# Patient Record
Sex: MALE | Race: WHITE | NOT HISPANIC OR LATINO | Employment: UNEMPLOYED | URBAN - NONMETROPOLITAN AREA
[De-identification: names, ages, dates, MRNs, and addresses within clinical notes are randomized per-mention and may not be internally consistent; named-entity substitution may affect disease eponyms.]

---

## 2019-08-19 ENCOUNTER — OFFICE VISIT (OUTPATIENT)
Dept: CARDIOLOGY | Facility: CLINIC | Age: 44
End: 2019-08-19

## 2019-08-19 VITALS
HEART RATE: 85 BPM | SYSTOLIC BLOOD PRESSURE: 127 MMHG | HEIGHT: 69 IN | WEIGHT: 198.2 LBS | OXYGEN SATURATION: 97 % | BODY MASS INDEX: 29.36 KG/M2 | DIASTOLIC BLOOD PRESSURE: 86 MMHG

## 2019-08-19 DIAGNOSIS — R07.9 CHEST PAIN, UNSPECIFIED TYPE: ICD-10-CM

## 2019-08-19 DIAGNOSIS — R06.02 SHORTNESS OF BREATH: Primary | ICD-10-CM

## 2019-08-19 DIAGNOSIS — I25.10 CORONARY ARTERY DISEASE INVOLVING NATIVE CORONARY ARTERY OF NATIVE HEART WITHOUT ANGINA PECTORIS: ICD-10-CM

## 2019-08-19 DIAGNOSIS — I25.5 CARDIOMYOPATHY, ISCHEMIC: ICD-10-CM

## 2019-08-19 PROCEDURE — 99204 OFFICE O/P NEW MOD 45 MIN: CPT | Performed by: PHYSICIAN ASSISTANT

## 2019-08-19 RX ORDER — POTASSIUM CHLORIDE 750 MG/1
TABLET, FILM COATED, EXTENDED RELEASE ORAL 2 TIMES DAILY
COMMUNITY
Start: 2019-08-07 | End: 2022-01-04 | Stop reason: SDUPTHER

## 2019-08-19 RX ORDER — ATORVASTATIN CALCIUM 80 MG/1
80 TABLET, FILM COATED ORAL DAILY
COMMUNITY
Start: 2019-08-13 | End: 2022-01-04 | Stop reason: SDUPTHER

## 2019-08-19 RX ORDER — NICOTINE 21 MG/24HR
PATCH, TRANSDERMAL 24 HOURS TRANSDERMAL DAILY
COMMUNITY
Start: 2019-08-13 | End: 2019-11-21

## 2019-08-19 RX ORDER — ASPIRIN 81 MG
TABLET, DELAYED RELEASE (ENTERIC COATED) ORAL 2 TIMES DAILY
COMMUNITY
Start: 2019-07-15

## 2019-08-19 RX ORDER — ISOSORBIDE MONONITRATE 30 MG/1
30 TABLET, EXTENDED RELEASE ORAL EVERY MORNING
Qty: 30 TABLET | Refills: 11 | Status: SHIPPED | OUTPATIENT
Start: 2019-08-19 | End: 2019-11-21

## 2019-08-19 RX ORDER — METOPROLOL SUCCINATE 25 MG/1
25 TABLET, EXTENDED RELEASE ORAL DAILY
COMMUNITY
Start: 2019-08-07 | End: 2020-08-20 | Stop reason: SDUPTHER

## 2019-08-19 RX ORDER — ASPIRIN 81 MG/1
81 TABLET, COATED ORAL DAILY
COMMUNITY
Start: 2019-07-15 | End: 2022-01-04 | Stop reason: SDUPTHER

## 2019-08-19 RX ORDER — ACETAMINOPHEN 325 MG/1
325 TABLET ORAL AS NEEDED
COMMUNITY
Start: 2019-07-15

## 2019-08-19 RX ORDER — NITROGLYCERIN 0.4 MG/1
TABLET SUBLINGUAL
Qty: 100 TABLET | Refills: 11 | Status: SHIPPED | OUTPATIENT
Start: 2019-08-19 | End: 2023-01-16 | Stop reason: SDUPTHER

## 2019-08-19 RX ORDER — FUROSEMIDE 20 MG/1
20 TABLET ORAL DAILY
COMMUNITY
Start: 2019-08-07 | End: 2022-01-04 | Stop reason: SDUPTHER

## 2019-08-19 NOTE — PROGRESS NOTES
Subjective   Navi Hanley is a 44 y.o. male     Chief Complaint   Patient presents with   • Establish Care     presents for cardiac eval ([facility] 4/2019)   • Chest Pain   • Palpitations   • Shortness of Breath       HPI    Problem list  1.  Coronary artery disease  1.1 cardiac catheterization April 2019 demonstrated high-grade three-vessel disease with an EF of 30% with recommendations for coronary artery bypass grafting  1.2 coronary artery bypass grafting, three-vessel, April 2019 per patient report, inadequate data  2.  Ischemic cardiomyopathy  2.1 EF estimated 30% by catheterization April 2019  3.  Dyslipidemia  4.  Chronic tobacco use      Patient is a 44-year-old male who presents back to the office for an evaluation.  Patient had an episode of chest discomfort that required hospitalization in April and subsequent coronary artery bypass grafting.  Patient is here today to establish care not having seen a cardiologist in the outpatient setting.    Patient describes to me that he feels better although still has occasional pressure.  Patient describes to me on the day if he has severe chest pain that it felt as if 100 pounds was sitting on his chest.  He still feels slight pressure but not as severe as what he felt with previous bypass.  He has shortness of breath.  This is experienced  with him to climb heels or walk for extended periods.  Otherwise he is doing well.  He has no PND orthopnea.    He does not palpitated of dysrhythmic symptoms.  Patient describes trying to quit using tobacco.  He is on nicotine patches and is decreasing his tobacco use.  Otherwise is doing well      Current Outpatient Medications   Medication Sig Dispense Refill   • ASPIRIN LOW DOSE 81 MG EC tablet Daily.     • atorvastatin (LIPITOR) 80 MG tablet Daily.     • furosemide (LASIX) 20 MG tablet Daily.     • MAPAP 325 MG tablet As Needed.     • metoprolol succinate XL (TOPROL-XL) 25 MG 24 hr tablet Daily.     • nicotine  (NICODERM CQ) 14 MG/24HR patch Daily.     • potassium chloride (K-DUR) 10 MEQ CR tablet 2 (Two) Times a Day.     • SENNA PLUS 8.6-50 MG per tablet Daily.     • isosorbide mononitrate (IMDUR) 30 MG 24 hr tablet Take 1 tablet by mouth Every Morning. 30 tablet 11   • nitroglycerin (NITROSTAT) 0.4 MG SL tablet 1 under the tongue as needed for angina, may repeat q5mins for up three doses 100 tablet 11     No current facility-administered medications for this visit.        Patient has no known allergies.    Past Medical History:   Diagnosis Date   • Coronary artery disease    • Hyperlipidemia    • Hypertension    • Myocardial infarction (CMS/HCC)        Social History     Socioeconomic History   • Marital status:      Spouse name: Not on file   • Number of children: Not on file   • Years of education: Not on file   • Highest education level: Not on file   Tobacco Use   • Smoking status: Current Every Day Smoker     Years: 30.00     Types: Cigarettes   • Smokeless tobacco: Former User   Substance and Sexual Activity   • Alcohol use: No     Frequency: Never   • Drug use: No           Family History   Problem Relation Age of Onset   • Heart disease Mother    • Cancer Mother    • Hyperlipidemia Father    • Hypertension Father        Review of Systems   Constitutional: Positive for fatigue.   HENT: Negative.    Eyes: Negative.    Respiratory: Positive for shortness of breath (with daily activity  ) and wheezing.    Cardiovascular: Positive for chest pain (pressure) and leg swelling. Negative for palpitations (occas).   Gastrointestinal: Positive for nausea and vomiting.   Endocrine: Negative.    Genitourinary: Negative.    Musculoskeletal: Positive for back pain.   Skin: Negative.    Allergic/Immunologic: Negative.    Neurological: Positive for weakness and numbness (on left side following CABG).   Hematological: Negative.    Psychiatric/Behavioral: Positive for agitation and sleep disturbance (wakes up smothering).  "The patient is nervous/anxious.    All other systems reviewed and are negative.      Objective   Vitals:    08/19/19 1454   BP: 127/86   BP Location: Left arm   Patient Position: Sitting   Pulse: 85   SpO2: 97%   Weight: 89.9 kg (198 lb 3.2 oz)   Height: 175.3 cm (69\")      /86 (BP Location: Left arm, Patient Position: Sitting)   Pulse 85   Ht 175.3 cm (69\")   Wt 89.9 kg (198 lb 3.2 oz)   SpO2 97%   BMI 29.27 kg/m²     Lab Results (most recent)     None          Physical Exam   Constitutional: He is oriented to person, place, and time. He appears well-developed and well-nourished. No distress.   HENT:   Head: Normocephalic and atraumatic.   Eyes: EOM are normal. Pupils are equal, round, and reactive to light.   Neck: No JVD present.   Cardiovascular: Normal rate, regular rhythm, normal heart sounds and intact distal pulses. Exam reveals no gallop and no friction rub.   No murmur heard.  Pulmonary/Chest: Effort normal and breath sounds normal. No respiratory distress. He has no wheezes. He has no rales. He exhibits no tenderness.   Musculoskeletal: Normal range of motion. He exhibits no edema.   Neurological: He is alert and oriented to person, place, and time. No cranial nerve deficit.   Skin: Skin is warm and dry. No rash noted. No erythema. No pallor.   Psychiatric: He has a normal mood and affect. His behavior is normal.   Nursing note and vitals reviewed.      Procedure   Procedures         Assessment/Plan     Problems Addressed this Visit        Cardiovascular and Mediastinum    Cardiomyopathy, ischemic    Relevant Medications    metoprolol succinate XL (TOPROL-XL) 25 MG 24 hr tablet    isosorbide mononitrate (IMDUR) 30 MG 24 hr tablet    nitroglycerin (NITROSTAT) 0.4 MG SL tablet    Other Relevant Orders    Comprehensive Metabolic Panel    Lipid Panel    Coronary artery disease involving native coronary artery of native heart without angina pectoris    Relevant Medications    metoprolol succinate XL " (TOPROL-XL) 25 MG 24 hr tablet    isosorbide mononitrate (IMDUR) 30 MG 24 hr tablet    nitroglycerin (NITROSTAT) 0.4 MG SL tablet    Other Relevant Orders    Comprehensive Metabolic Panel    Lipid Panel       Respiratory    Shortness of breath - Primary    Relevant Orders    Comprehensive Metabolic Panel    Lipid Panel       Nervous and Auditory    Chest pain    Relevant Orders    Comprehensive Metabolic Panel    Lipid Panel            Recommendation  1.  Patient with coronary artery disease with continued complaints of chest discomfort although not as severe as what he felt previous bypass.  I would like to empirically try him on antianginal therapy to see if we could medically improve his symptoms further.  2.  Obviously, we want to become aggressive at low risk factor modification.  I would like to recheck patient's laboratories to see what his cholesterol is.  We again discussed tobacco cessation and the potential benefits of stopping.  3.  I would like to reevaluate patient's cardiomyopathy.  He is on beta-blocker therapy.  I would like to see his LV function post revascularization and we will schedule echocardiogram.  4.  I am sending in nitroglycerin as needed for chest pain.  I have counseled him on how to use that medication.  Any chest pain, not resolved by nitroglycerin, he will go to the ER.  5.  We will hopefully receive patient's op note from [facility].  We will see patient back for follow-up after testing.  Follow-up with primary as scheduled           Navi Hanley is a current cigarettes user.  He currently smokes 1 pack of cigarettes per day for a duration of 30 years. I have educated him on the risk of diseases from using tobacco products such as cancer, COPD and heart diease.     I advised him to quit and he is not willing to quit.           Patient's Body mass index is 29.27 kg/m². BMI is within normal parameters. No follow-up required..         Electronically signed by:

## 2019-10-21 ENCOUNTER — TELEPHONE (OUTPATIENT)
Dept: CARDIOLOGY | Facility: CLINIC | Age: 44
End: 2019-10-21

## 2019-10-21 ENCOUNTER — OFFICE VISIT (OUTPATIENT)
Dept: CARDIOLOGY | Facility: CLINIC | Age: 44
End: 2019-10-21

## 2019-10-21 VITALS
WEIGHT: 197 LBS | DIASTOLIC BLOOD PRESSURE: 86 MMHG | HEART RATE: 106 BPM | BODY MASS INDEX: 29.18 KG/M2 | SYSTOLIC BLOOD PRESSURE: 137 MMHG | OXYGEN SATURATION: 97 % | HEIGHT: 69 IN | RESPIRATION RATE: 16 BRPM

## 2019-10-21 DIAGNOSIS — I50.22 CHRONIC SYSTOLIC CONGESTIVE HEART FAILURE (HCC): ICD-10-CM

## 2019-10-21 DIAGNOSIS — R06.02 SHORTNESS OF BREATH: Primary | ICD-10-CM

## 2019-10-21 DIAGNOSIS — I25.5 ISCHEMIC CARDIOMYOPATHY: ICD-10-CM

## 2019-10-21 DIAGNOSIS — I25.10 CORONARY ARTERY DISEASE INVOLVING NATIVE CORONARY ARTERY OF NATIVE HEART WITHOUT ANGINA PECTORIS: ICD-10-CM

## 2019-10-21 PROCEDURE — 99214 OFFICE O/P EST MOD 30 MIN: CPT | Performed by: PHYSICIAN ASSISTANT

## 2019-10-21 RX ORDER — RANOLAZINE 1000 MG/1
1000 TABLET, EXTENDED RELEASE ORAL 2 TIMES DAILY
Qty: 60 TABLET | Refills: 6 | Status: SHIPPED | OUTPATIENT
Start: 2019-10-21 | End: 2019-11-21 | Stop reason: ALTCHOICE

## 2019-10-21 NOTE — PROGRESS NOTES
Problem list     Subjective   Navi Hanley is a 44 y.o. male     Chief Complaint   Patient presents with   • Coronary Artery Disease   • Shortness of Breath   Problem list  1.  Coronary artery disease  1.1 cardiac catheterization April 2019 demonstrated high-grade three-vessel disease with an EF of 30% with recommendations for coronary artery bypass grafting  1.2 coronary artery bypass grafting, three-vessel, April 2019 per patient report, inadequate data  2.  Ischemic cardiomyopathy  2.1 EF estimated 30% by catheterization April 2019  3.  Dyslipidemia  4.  Chronic tobacco use       HPI      Patient is a 44-year-old male who presents back to the office for follow-up.  We saw the patient today in regards to patient's coronary disease as well as for persistent chest discomfort.  Last office visit we titrated antianginal therapy to help with symptoms but unfortunately continues to have symptoms of discomfort.  Symptoms can have some similarities to what he felt with previous bypass grafting.  Pain has not been severe but still has remained noticeable.  He has moderate levels of shortness of breath but likely has lung disease with continuing to use tobacco.  He has no PND orthopnea.    He does not complain of palpitations or dysrhythmic symptoms.  He otherwise is doing well      Outpatient Encounter Medications as of 10/21/2019   Medication Sig Dispense Refill   • ASPIRIN LOW DOSE 81 MG EC tablet Daily.     • atorvastatin (LIPITOR) 80 MG tablet Daily.     • furosemide (LASIX) 20 MG tablet Daily.     • isosorbide mononitrate (IMDUR) 30 MG 24 hr tablet Take 1 tablet by mouth Every Morning. 30 tablet 11   • MAPAP 325 MG tablet As Needed.     • metoprolol succinate XL (TOPROL-XL) 25 MG 24 hr tablet Daily.     • nicotine (NICODERM CQ) 14 MG/24HR patch Daily.     • nitroglycerin (NITROSTAT) 0.4 MG SL tablet 1 under the tongue as needed for angina, may repeat q5mins for up three doses 100 tablet 11   • potassium chloride  (K-DUR) 10 MEQ CR tablet 2 (Two) Times a Day.     • SENNA PLUS 8.6-50 MG per tablet Daily.     • ranolazine (RANEXA) 1000 MG 12 hr tablet Take 1 tablet by mouth 2 (Two) Times a Day. 60 tablet 6     No facility-administered encounter medications on file as of 10/21/2019.        Patient has no known allergies.    Past Medical History:   Diagnosis Date   • Coronary artery disease    • Hyperlipidemia    • Hypertension    • Myocardial infarction (CMS/HCC)        Social History     Socioeconomic History   • Marital status:      Spouse name: Not on file   • Number of children: Not on file   • Years of education: Not on file   • Highest education level: Not on file   Tobacco Use   • Smoking status: Current Every Day Smoker     Packs/day: 1.00     Years: 30.00     Pack years: 30.00     Types: Cigarettes   • Smokeless tobacco: Former User   Substance and Sexual Activity   • Alcohol use: No     Frequency: Never   • Drug use: No       Family History   Problem Relation Age of Onset   • Heart disease Mother    • Cancer Mother    • Hyperlipidemia Father    • Hypertension Father        Review of Systems   Constitutional: Positive for fatigue. Negative for activity change and appetite change.   HENT: Negative.    Eyes: Negative.    Respiratory: Positive for chest tightness and shortness of breath.    Cardiovascular: Positive for chest pain. Negative for palpitations and leg swelling.   Gastrointestinal: Negative for abdominal distention, abdominal pain, nausea and vomiting.   Endocrine: Negative for cold intolerance and heat intolerance.   Genitourinary: Negative.    Musculoskeletal: Positive for arthralgias, back pain and joint swelling. Negative for gait problem and myalgias.   Skin: Negative for color change and rash.   Allergic/Immunologic: Negative for environmental allergies and food allergies.   Neurological: Positive for dizziness (upon standing or with quick sudden movements) and light-headedness (with quick sudden  "movements). Negative for syncope.   Hematological: Does not bruise/bleed easily.   Psychiatric/Behavioral: Negative for agitation, sleep disturbance and suicidal ideas. The patient is not nervous/anxious.    All other systems reviewed and are negative.      Objective   Vitals:    10/21/19 1405   BP: 137/86   BP Location: Left arm   Patient Position: Sitting   Pulse: 106   Resp: 16   SpO2: 97%   Weight: 89.4 kg (197 lb)   Height: 175.3 cm (69\")      /86 (BP Location: Left arm, Patient Position: Sitting)   Pulse 106   Resp 16   Ht 175.3 cm (69\")   Wt 89.4 kg (197 lb)   SpO2 97%   BMI 29.09 kg/m²     Lab Results (most recent)     None          Physical Exam   Constitutional: He is oriented to person, place, and time. He appears well-developed and well-nourished. No distress.   HENT:   Head: Normocephalic and atraumatic.   Eyes: EOM are normal. Pupils are equal, round, and reactive to light.   Neck: No JVD present.   Cardiovascular: Normal rate, regular rhythm, normal heart sounds and intact distal pulses. Exam reveals no gallop and no friction rub.   No murmur heard.  Pulmonary/Chest: Effort normal and breath sounds normal. No respiratory distress. He has no wheezes. He has no rales. He exhibits no tenderness.   Musculoskeletal: Normal range of motion. He exhibits no edema.   Neurological: He is alert and oriented to person, place, and time. No cranial nerve deficit.   Skin: Skin is warm and dry. No rash noted. No erythema. No pallor.   Psychiatric: He has a normal mood and affect. His behavior is normal.   Nursing note and vitals reviewed.      Procedure   Procedures       Assessment/Plan     Problems Addressed this Visit        Cardiovascular and Mediastinum    Ischemic cardiomyopathy    Relevant Medications    ranolazine (RANEXA) 1000 MG 12 hr tablet    Other Relevant Orders    Adult Transthoracic Echo Complete W/ Cont if Necessary Per Protocol    Stress Test With Myocardial Perfusion One Day    D-dimer, " Quantitative    Coronary artery disease involving native coronary artery of native heart without angina pectoris    Relevant Medications    ranolazine (RANEXA) 1000 MG 12 hr tablet    Other Relevant Orders    Adult Transthoracic Echo Complete W/ Cont if Necessary Per Protocol    Stress Test With Myocardial Perfusion One Day    D-dimer, Quantitative    Chronic systolic congestive heart failure (CMS/HCC)    Relevant Medications    ranolazine (RANEXA) 1000 MG 12 hr tablet    Other Relevant Orders    Adult Transthoracic Echo Complete W/ Cont if Necessary Per Protocol    Stress Test With Myocardial Perfusion One Day    D-dimer, Quantitative       Respiratory    Shortness of breath - Primary    Relevant Orders    Adult Transthoracic Echo Complete W/ Cont if Necessary Per Protocol    Stress Test With Myocardial Perfusion One Day    D-dimer, Quantitative          Recommendation  1.  Patient with significant chest discomfort and moderate levels of persistent dyspnea.  I would like to expedite cardiac testing because of his symptoms.  2.  Stress test will be ordered for stratification.  Echo to reevaluate LV structure and function and evaluate and rule out pericardial disease status post bypass.  I would like to check noncardiac causes of symptoms.  We will check a d-dimer.  3.  We will continue to increase antianginal therapy.  He has nitroglycerin as needed for chest pain.  Any chest pain, not resolved by nitroglycerin, will go to the ER.  Will follow with primary as scheduled           Navi Hanley is a current cigarettes user.  He currently smokes 1 pack of cigarettes per day for a duration of 30 years. I have educated him on the risk of diseases from using tobacco products such as cancer, COPD and heart diease.     I advised him to quit and he is not willing to quit.    I spent < 3  minutes counseling the patient.         Patient's Body mass index is 29.09 kg/m². BMI is above normal parameters. Recommendations  include: educational material.       Electronically signed by:

## 2019-10-21 NOTE — PATIENT INSTRUCTIONS
Steps to Quit Smoking    Smoking tobacco can be harmful to your health and can affect almost every organ in your body. Smoking puts you, and those around you, at risk for developing many serious chronic diseases. Quitting smoking is difficult, but it is one of the best things that you can do for your health. It is never too late to quit.  What are the benefits of quitting smoking?  When you quit smoking, you lower your risk of developing serious diseases and conditions, such as:  · Lung cancer or lung disease, such as COPD.  · Heart disease.  · Stroke.  · Heart attack.  · Infertility.  · Osteoporosis and bone fractures.  Additionally, symptoms such as coughing, wheezing, and shortness of breath may get better when you quit. You may also find that you get sick less often because your body is stronger at fighting off colds and infections. If you are pregnant, quitting smoking can help to reduce your chances of having a baby of low birth weight.  How do I get ready to quit?  When you decide to quit smoking, create a plan to make sure that you are successful. Before you quit:  · Pick a date to quit. Set a date within the next two weeks to give you time to prepare.  · Write down the reasons why you are quitting. Keep this list in places where you will see it often, such as on your bathroom mirror or in your car or wallet.  · Identify the people, places, things, and activities that make you want to smoke (triggers) and avoid them. Make sure to take these actions:  ? Throw away all cigarettes at home, at work, and in your car.  ? Throw away smoking accessories, such as ashtrays and lighters.  ? Clean your car and make sure to empty the ashtray.  ? Clean your home, including curtains and carpets.  · Tell your family, friends, and coworkers that you are quitting. Support from your loved ones can make quitting easier.  · Talk with your health care provider about your options for quitting smoking.  · Find out what treatment  options are covered by your health insurance.  What strategies can I use to quit smoking?  Talk with your healthcare provider about different strategies to quit smoking. Some strategies include:  · Quitting smoking altogether instead of gradually lessening how much you smoke over a period of time. Research shows that quitting “cold turkey” is more successful than gradually quitting.  · Attending in-person counseling to help you build problem-solving skills. You are more likely to have success in quitting if you attend several counseling sessions. Even short sessions of 10 minutes can be effective.  · Finding resources and support systems that can help you to quit smoking and remain smoke-free after you quit. These resources are most helpful when you use them often. They can include:  ? Online chats with a counselor.  ? Telephone quitlines.  ? Printed self-help materials.  ? Support groups or group counseling.  ? Text messaging programs.  ? Mobile phone applications.  · Taking medicines to help you quit smoking. (If you are pregnant or breastfeeding, talk with your health care provider first.) Some medicines contain nicotine and some do not. Both types of medicines help with cravings, but the medicines that include nicotine help to relieve withdrawal symptoms. Your health care provider may recommend:  ? Nicotine patches, gum, or lozenges.  ? Nicotine inhalers or sprays.  ? Non-nicotine medicine that is taken by mouth.  Talk with your health care provider about combining strategies, such as taking medicines while you are also receiving in-person counseling. Using these two strategies together makes you more likely to succeed in quitting than if you used either strategy on its own.  If you are pregnant or breastfeeding, talk with your health care provider about finding counseling or other support strategies to quit smoking. Do not take medicine to help you quit smoking unless told to do so by your health care  provider.  What things can I do to make it easier to quit?  Quitting smoking might feel overwhelming at first, but there is a lot that you can do to make it easier. Take these important actions:  · Reach out to your family and friends and ask that they support and encourage you during this time. Call telephone quitlines, reach out to support groups, or work with a counselor for support.  · Ask people who smoke to avoid smoking around you.  · Avoid places that trigger you to smoke, such as bars, parties, or smoke-break areas at work.  · Spend time around people who do not smoke.  · Lessen stress in your life, because stress can be a smoking trigger for some people. To lessen stress, try:  ? Exercising regularly.  ? Deep-breathing exercises.  ? Yoga.  ? Meditating.  ? Performing a body scan. This involves closing your eyes, scanning your body from head to toe, and noticing which parts of your body are particularly tense. Purposefully relax the muscles in those areas.  · Download or purchase mobile phone or tablet apps (applications) that can help you stick to your quit plan by providing reminders, tips, and encouragement. There are many free apps, such as QuitGuide from the CDC (Centers for Disease Control and Prevention). You can find other support for quitting smoking (smoking cessation) through smokefree.gov and other websites.  How will I feel when I quit smoking?  Within the first 24 hours of quitting smoking, you may start to feel some withdrawal symptoms. These symptoms are usually most noticeable 2-3 days after quitting, but they usually do not last beyond 2-3 weeks. Changes or symptoms that you might experience include:  · Mood swings.  · Restlessness, anxiety, or irritation.  · Difficulty concentrating.  · Dizziness.  · Strong cravings for sugary foods in addition to nicotine.  · Mild weight gain.  · Constipation.  · Nausea.  · Coughing or a sore throat.  · Changes in how your medicines work in your  body.  · A depressed mood.  · Difficulty sleeping (insomnia).  After the first 2-3 weeks of quitting, you may start to notice more positive results, such as:  · Improved sense of smell and taste.  · Decreased coughing and sore throat.  · Slower heart rate.  · Lower blood pressure.  · Clearer skin.  · The ability to breathe more easily.  · Fewer sick days.  Quitting smoking is very challenging for most people. Do not get discouraged if you are not successful the first time. Some people need to make many attempts to quit before they achieve long-term success. Do your best to stick to your quit plan, and talk with your health care provider if you have any questions or concerns.  This information is not intended to replace advice given to you by your health care provider. Make sure you discuss any questions you have with your health care provider.  Document Released: 12/12/2002 Document Revised: 07/24/2018 Document Reviewed: 05/03/2016  [employer] Interactive Patient Education © 2019 [employer]  Fat and Cholesterol Restricted Eating Plan  Getting too much fat and cholesterol in your diet may cause health problems. Choosing the right foods helps keep your fat and cholesterol at normal levels. This can keep you from getting certain diseases.  Your doctor may recommend an eating plan that includes:  · Total fat: ______% or less of total calories a day.  · Saturated fat: ______% or less of total calories a day.  · Cholesterol: less than _________mg a day.  · Fiber: ______g a day.  What are tips for following this plan?  General tips    · Work with your doctor to lose weight if you need to.  · Avoid:  ? Foods with added sugar.  ? Fried foods.  ? Foods with partially hydrogenated oils.  · Limit alcohol intake to no more than 1 drink a day for nonpregnant women and 2 drinks a day for men. One drink equals 12 oz of beer, 5 oz of wine, or 1½ oz of hard liquor.  Reading food labels  · Check food labels for:  ? Trans  "fats.  ? Partially hydrogenated oils.  ? Saturated fat (g) in each serving.  ? Cholesterol (mg) in each serving.  ? Fiber (g) in each serving.  · Choose foods with healthy fats, such as:  ? Monounsaturated fats.  ? Polyunsaturated fats.  ? Omega-3 fats.  · Choose grain products that have whole grains. Look for the word \"whole\" as the first word in the ingredient list.  Cooking  · Cook foods using low-fat methods. These include baking, boiling, grilling, and broiling.  · Eat more home-cooked foods. Eat at restaurants and buffets less often.  · Avoid cooking using saturated fats, such as butter, cream, palm oil, palm kernel oil, and coconut oil.  Meal planning    · At meals, divide your plate into four equal parts:  ? Fill one-half of your plate with vegetables and green salads.  ? Fill one-fourth of your plate with whole grains.  ? Fill one-fourth of your plate with low-fat (lean) protein foods.  · Eat fish that is high in omega-3 fats at least two times a week. This includes mackerel, tuna, sardines, and salmon.  · Eat foods that are high in fiber, such as whole grains, beans, apples, broccoli, carrots, peas, and barley.  Recommended foods  Grains  · Whole grains, such as whole wheat or whole grain breads, crackers, cereals, and pasta. Unsweetened oatmeal, bulgur, barley, quinoa, or brown rice. Corn or whole wheat flour tortillas.  Vegetables  · Fresh or frozen vegetables (raw, steamed, roasted, or grilled). Green salads.  Fruits  · All fresh, canned (in natural juice), or frozen fruits.  Meats and other protein foods  · Ground beef (85% or leaner), grass-fed beef, or beef trimmed of fat. Skinless chicken or turkey. Ground chicken or turkey. Pork trimmed of fat. All fish and seafood. Egg whites. Dried beans, peas, or lentils. Unsalted nuts or seeds. Unsalted canned beans. Nut butters without added sugar or oil.  Dairy  · Low-fat or nonfat dairy products, such as skim or 1% milk, 2% or reduced-fat cheeses, low-fat " and fat-free ricotta or cottage cheese, or plain low-fat and nonfat yogurt.  Fats and oils  · Tub margarine without trans fats. Light or reduced-fat mayonnaise and salad dressings. Avocado. Olive, canola, sesame, or safflower oils.  The items listed above may not be a complete list of recommended foods or beverages. Contact your dietitian for more options.  The items listed above may not be a complete list of foods and beverages [you/your child] can eat. Contact a dietitian for more information.  Foods to avoid  Grains  · White bread. White pasta. White rice. Cornbread. Bagels, pastries, and croissants. Crackers and snack foods that contain trans fat and hydrogenated oils.  Vegetables  · Vegetables cooked in cheese, cream, or butter sauce. Fried vegetables.  Fruits  · Canned fruit in heavy syrup. Fruit in cream or butter sauce. Fried fruit.  Meats and other protein foods  · Fatty cuts of meat. Ribs, chicken wings, alfonso, sausage, bologna, salami, chitterlings, fatback, hot dogs, bratwurst, and packaged lunch meats. Liver and organ meats. Whole eggs and egg yolks. Chicken and turkey with skin. Fried meat.  Dairy  · Whole or 2% milk, cream, half-and-half, and cream cheese. Whole milk cheeses. Whole-fat or sweetened yogurt. Full-fat cheeses. Nondairy creamers and whipped toppings. Processed cheese, cheese spreads, and cheese curds.  Beverages  · Alcohol. Sugar-sweetened drinks such as sodas, lemonade, and fruit drinks.  Fats and oils  · Butter, stick margarine, lard, shortening, ghee, or alfonso fat. Coconut, palm kernel, and palm oils.  Sweets and desserts  · Corn syrup, sugars, honey, and molasses. Candy. Jam and jelly. Syrup. Sweetened cereals. Cookies, pies, cakes, donuts, muffins, and ice cream.  The items listed above may not be a complete list of foods and beverages to avoid. Contact your dietitian for more information.  The items listed above may not be a complete list of foods and beverages [you/your child]  "should avoid. Contact a dietitian for more information.  Summary  · Choosing the right foods helps keep your fat and cholesterol at normal levels. This can keep you from getting certain diseases.  · At meals, fill one-half of your plate with vegetables and green salads.  · Eat high-fiber foods, like whole grains, beans, apples, carrots, peas, and barley.  · Limit added sugar, saturated fats, alcohol, and fried foods.  This information is not intended to replace advice given to you by your health care provider. Make sure you discuss any questions you have with your health care provider.  Document Released: 06/18/2013 Document Revised: 09/04/2018 Document Reviewed: 09/04/2018  [employer] Interactive Patient Education © 2019 [employer]  BMI for Adults    Body mass index (BMI) is a number that is calculated from a person's weight and height. BMI may help to estimate how much of a person's weight is composed of fat. BMI can help identify those who may be at higher risk for certain medical problems.  How is BMI used with adults?  BMI is used as a screening tool to identify possible weight problems. It is used to check whether a person is obese, overweight, healthy weight, or underweight.  How is BMI calculated?  BMI measures your weight and compares it to your height. This can be done either in English ([place] or metric measurements. Note that charts are available to help you find your BMI quickly and easily without having to do these calculations yourself.  To calculate your BMI in English ([place] measurements, your health care provider will:  1. Measure your weight in pounds (lb).  2. Multiply the number of pounds by 703.  ? For example, for a person who weighs 180 lb, multiply that number by 703, which equals 126,540.  3. Measure your height in inches (in). Then multiply that number by itself to get a measurement called \"inches squared.\"  ? For example, for a person who is 70 in tall, the \"inches squared\" measurement is " "70 in x 70 in, which equals 4900 inches squared.  4. Divide the total from Step 2 (number of lb x 703) by the total from Step 3 (inches squared): 126,540 ÷ 4900 = 25.8. This is your BMI.  To calculate your BMI in metric measurements, your health care provider will:  1. Measure your weight in kilograms (kg).  2. Measure your height in meters (m). Then multiply that number by itself to get a measurement called \"meters squared.\"  ? For example, for a person who is 1.75 m tall, the \"meters squared\" measurement is 1.75 m x 1.75 m, which is equal to 3.1 meters squared.  3. Divide the number of kilograms (your weight) by the meters squared number. In this example: 70 ÷ 3.1 = 22.6. This is your BMI.  How is BMI interpreted?  To interpret your results, your health care provider will use BMI charts to identify whether you are underweight, normal weight, overweight, or obese. The following guidelines will be used:  · Underweight: BMI less than 18.5.  · Normal weight: BMI between 18.5 and 24.9.  · Overweight: BMI between 25 and 29.9.  · Obese: BMI of 30 and above.  Please note:  · Weight includes both fat and muscle, so someone with a muscular build, such as an athlete, may have a BMI that is higher than 24.9. In cases like these, BMI is not an accurate measure of body fat.  · To determine if excess body fat is the cause of a BMI of 25 or higher, further assessments may need to be done by a health care provider.  · BMI is usually interpreted in the same way for men and women.  Why is BMI a useful tool?  BMI is useful in two ways:  · Identifying a weight problem that may be related to a medical condition, or that may increase the risk for medical problems.  · Promoting lifestyle and diet changes in order to reach a healthy weight.  Summary  · Body mass index (BMI) is a number that is calculated from a person's weight and height.  · BMI may help to estimate how much of a person's weight is composed of fat. BMI can help identify " those who may be at higher risk for certain medical problems.  · BMI can be measured using English measurements or metric measurements.  · To interpret your results, your health care provider will use BMI charts to identify whether you are underweight, normal weight, overweight, or obese.  This information is not intended to replace advice given to you by your health care provider. Make sure you discuss any questions you have with your health care provider.  Document Released: 08/29/2005 Document Revised: 10/31/2018 Document Reviewed: 10/31/2018  [employer] Interactive Patient Education © 2019 [employer]

## 2019-10-21 NOTE — TELEPHONE ENCOUNTER
Pharmacist called from [facility] stating Ranexa 1000 mg not covered by patients insurance, She asked if we wanted to do a PA. Informed her to fax us the information and we would do a PA. Pharmacist asked to explain to patient. Informed patient while at [facility] we would need to do a PA that it could take several days to see if insurance would approve. We would contact him and pharmacy when PA decision made. Patient verbalized understanding. Tamika Abdul LPN

## 2019-10-22 ENCOUNTER — TELEPHONE (OUTPATIENT)
Dept: CARDIOLOGY | Facility: CLINIC | Age: 44
End: 2019-10-22

## 2019-10-22 RX ORDER — AMLODIPINE BESYLATE 2.5 MG/1
2.5 TABLET ORAL DAILY
Qty: 30 TABLET | Refills: 11 | Status: SHIPPED | OUTPATIENT
Start: 2019-10-22 | End: 2019-12-16 | Stop reason: SDUPTHER

## 2019-10-22 NOTE — TELEPHONE ENCOUNTER
Notified patient his insurance did not approve Ranexa due to not having tried a calcium channel blocker.   Verbal order per Bolivar Poon PA-C for Amlodipine 2.5 mg daily. Patient is aware. Carmela Limon MA

## 2019-10-22 NOTE — TELEPHONE ENCOUNTER
Called patient regarding Ranexa PA to see if he has previously taken a calcium channel blocker. Patient stated he has not taken any other medications than what we have listed. Carmela Limon MA

## 2019-11-06 ENCOUNTER — HOSPITAL ENCOUNTER (OUTPATIENT)
Dept: CARDIOLOGY | Facility: HOSPITAL | Age: 44
Discharge: HOME | End: 2019-11-06
Admitting: PHYSICIAN ASSISTANT

## 2019-11-06 ENCOUNTER — APPOINTMENT (OUTPATIENT)
Dept: CARDIOLOGY | Facility: HOSPITAL | Age: 44
End: 2019-11-06

## 2019-11-06 DIAGNOSIS — I25.5 ISCHEMIC CARDIOMYOPATHY: ICD-10-CM

## 2019-11-06 DIAGNOSIS — I25.10 CORONARY ARTERY DISEASE INVOLVING NATIVE CORONARY ARTERY OF NATIVE HEART WITHOUT ANGINA PECTORIS: ICD-10-CM

## 2019-11-06 DIAGNOSIS — R06.02 SHORTNESS OF BREATH: ICD-10-CM

## 2019-11-06 DIAGNOSIS — I50.22 CHRONIC SYSTOLIC CONGESTIVE HEART FAILURE (HCC): ICD-10-CM

## 2019-11-06 PROCEDURE — 93306 TTE W/DOPPLER COMPLETE: CPT

## 2019-11-06 PROCEDURE — 93306 TTE W/DOPPLER COMPLETE: CPT | Performed by: INTERNAL MEDICINE

## 2019-11-07 ENCOUNTER — HOSPITAL ENCOUNTER (OUTPATIENT)
Dept: CARDIOLOGY | Facility: HOSPITAL | Age: 44
Discharge: HOME | End: 2019-11-07

## 2019-11-07 DIAGNOSIS — I25.10 CORONARY ARTERY DISEASE INVOLVING NATIVE CORONARY ARTERY OF NATIVE HEART WITHOUT ANGINA PECTORIS: ICD-10-CM

## 2019-11-07 DIAGNOSIS — I25.5 ISCHEMIC CARDIOMYOPATHY: ICD-10-CM

## 2019-11-07 DIAGNOSIS — I50.22 CHRONIC SYSTOLIC CONGESTIVE HEART FAILURE (HCC): ICD-10-CM

## 2019-11-07 DIAGNOSIS — R06.02 SHORTNESS OF BREATH: ICD-10-CM

## 2019-11-07 PROCEDURE — 93018 CV STRESS TEST I&R ONLY: CPT | Performed by: INTERNAL MEDICINE

## 2019-11-07 PROCEDURE — A9500 TC99M SESTAMIBI: HCPCS | Performed by: INTERNAL MEDICINE

## 2019-11-07 PROCEDURE — 78452 HT MUSCLE IMAGE SPECT MULT: CPT | Performed by: INTERNAL MEDICINE

## 2019-11-07 PROCEDURE — 93017 CV STRESS TEST TRACING ONLY: CPT

## 2019-11-07 PROCEDURE — 0 TECHNETIUM SESTAMIBI: Performed by: INTERNAL MEDICINE

## 2019-11-07 PROCEDURE — 93016 CV STRESS TEST SUPVJ ONLY: CPT | Performed by: NURSE PRACTITIONER

## 2019-11-07 PROCEDURE — 78452 HT MUSCLE IMAGE SPECT MULT: CPT

## 2019-11-07 PROCEDURE — 25010000002 REGADENOSON 0.4 MG/5ML SOLUTION: Performed by: INTERNAL MEDICINE

## 2019-11-07 RX ADMIN — REGADENOSON 0.4 MG: 0.08 INJECTION, SOLUTION INTRAVENOUS at 07:40

## 2019-11-07 RX ADMIN — TECHNETIUM TC 99M SESTAMIBI 1 DOSE: 1 INJECTION INTRAVENOUS at 07:37

## 2019-11-07 RX ADMIN — TECHNETIUM TC 99M SESTAMIBI 1 DOSE: 1 INJECTION INTRAVENOUS at 07:38

## 2019-11-11 LAB
BH CV NUCLEAR PRIOR STUDY: 3
BH CV STRESS BP STAGE 1: NORMAL
BH CV STRESS COMMENTS STAGE 1: NORMAL
BH CV STRESS DOSE REGADENOSON STAGE 1: 0.4
BH CV STRESS DURATION MIN STAGE 1: 0
BH CV STRESS DURATION SEC STAGE 1: 10
BH CV STRESS HR STAGE 1: 114
BH CV STRESS PROTOCOL 1: NORMAL
BH CV STRESS RECOVERY BP: NORMAL MMHG
BH CV STRESS RECOVERY HR: 106 BPM
BH CV STRESS STAGE 1: 1
MAXIMAL PREDICTED HEART RATE: 174 BPM
PERCENT MAX PREDICTED HR: 70.69 %
STRESS BASELINE BP: NORMAL MMHG
STRESS BASELINE HR: 92 BPM
STRESS PERCENT HR: 83 %
STRESS POST PEAK BP: NORMAL MMHG
STRESS POST PEAK HR: 123 BPM
STRESS TARGET HR: 148 BPM

## 2019-11-20 LAB
BH CV ECHO MEAS - ACS: 2.1 CM
BH CV ECHO MEAS - AO MAX PG: 4.2 MMHG
BH CV ECHO MEAS - AO MEAN PG: 2 MMHG
BH CV ECHO MEAS - AO ROOT AREA (BSA CORRECTED): 1.5
BH CV ECHO MEAS - AO ROOT AREA: 7.3 CM^2
BH CV ECHO MEAS - AO ROOT DIAM: 3.1 CM
BH CV ECHO MEAS - AO V2 MAX: 103 CM/SEC
BH CV ECHO MEAS - AO V2 MEAN: 72 CM/SEC
BH CV ECHO MEAS - AO V2 VTI: 15 CM
BH CV ECHO MEAS - BSA(HAYCOCK): 2.1 M^2
BH CV ECHO MEAS - BSA: 2.1 M^2
BH CV ECHO MEAS - BZI_BMI: 29.1 KILOGRAMS/M^2
BH CV ECHO MEAS - BZI_METRIC_HEIGHT: 175.3 CM
BH CV ECHO MEAS - BZI_METRIC_WEIGHT: 89.4 KG
BH CV ECHO MEAS - EDV(CUBED): 55.7 ML
BH CV ECHO MEAS - EDV(MOD-SP4): 76.7 ML
BH CV ECHO MEAS - EDV(TEICH): 62.7 ML
BH CV ECHO MEAS - EF(CUBED): 47.1 %
BH CV ECHO MEAS - EF(MOD-SP4): 56.7 %
BH CV ECHO MEAS - EF(TEICH): 40 %
BH CV ECHO MEAS - ESV(CUBED): 29.5 ML
BH CV ECHO MEAS - ESV(MOD-SP4): 33.2 ML
BH CV ECHO MEAS - ESV(TEICH): 37.6 ML
BH CV ECHO MEAS - FS: 19.1 %
BH CV ECHO MEAS - IVS/LVPW: 1.3
BH CV ECHO MEAS - IVSD: 1.4 CM
BH CV ECHO MEAS - LA DIMENSION: 3 CM
BH CV ECHO MEAS - LA/AO: 0.98
BH CV ECHO MEAS - LV DIASTOLIC VOL/BSA (35-75): 37.4 ML/M^2
BH CV ECHO MEAS - LV IVRT: 0.12 SEC
BH CV ECHO MEAS - LV MASS(C)D: 167.3 GRAMS
BH CV ECHO MEAS - LV MASS(C)DI: 81.5 GRAMS/M^2
BH CV ECHO MEAS - LV SYSTOLIC VOL/BSA (12-30): 16.2 ML/M^2
BH CV ECHO MEAS - LVIDD: 3.8 CM
BH CV ECHO MEAS - LVIDS: 3.1 CM
BH CV ECHO MEAS - LVLD AP4: 7.7 CM
BH CV ECHO MEAS - LVLS AP4: 6.4 CM
BH CV ECHO MEAS - LVOT AREA (M): 4.2 CM^2
BH CV ECHO MEAS - LVOT AREA: 4.2 CM^2
BH CV ECHO MEAS - LVOT DIAM: 2.3 CM
BH CV ECHO MEAS - LVPWD: 1.1 CM
BH CV ECHO MEAS - MV A MAX VEL: 49.7 CM/SEC
BH CV ECHO MEAS - MV DEC SLOPE: 151 CM/SEC^2
BH CV ECHO MEAS - MV E MAX VEL: 40.3 CM/SEC
BH CV ECHO MEAS - MV E/A: 0.81
BH CV ECHO MEAS - RVDD: 2.9 CM
BH CV ECHO MEAS - SI(AO): 53.4 ML/M^2
BH CV ECHO MEAS - SI(CUBED): 12.8 ML/M^2
BH CV ECHO MEAS - SI(MOD-SP4): 21.2 ML/M^2
BH CV ECHO MEAS - SI(TEICH): 12.2 ML/M^2
BH CV ECHO MEAS - SV(AO): 109.6 ML
BH CV ECHO MEAS - SV(CUBED): 26.2 ML
BH CV ECHO MEAS - SV(MOD-SP4): 43.5 ML
BH CV ECHO MEAS - SV(TEICH): 25.1 ML
MAXIMAL PREDICTED HEART RATE: 174 BPM
STRESS TARGET HR: 148 BPM

## 2019-11-21 ENCOUNTER — TELEPHONE (OUTPATIENT)
Dept: CARDIOLOGY | Facility: CLINIC | Age: 44
End: 2019-11-21

## 2019-11-21 ENCOUNTER — OFFICE VISIT (OUTPATIENT)
Dept: CARDIOLOGY | Facility: CLINIC | Age: 44
End: 2019-11-21

## 2019-11-21 VITALS
DIASTOLIC BLOOD PRESSURE: 79 MMHG | HEART RATE: 88 BPM | HEIGHT: 69 IN | BODY MASS INDEX: 29.92 KG/M2 | OXYGEN SATURATION: 98 % | WEIGHT: 202 LBS | SYSTOLIC BLOOD PRESSURE: 119 MMHG

## 2019-11-21 DIAGNOSIS — I50.22 CHRONIC SYSTOLIC CONGESTIVE HEART FAILURE (HCC): ICD-10-CM

## 2019-11-21 DIAGNOSIS — R07.2 PRECORDIAL PAIN: ICD-10-CM

## 2019-11-21 DIAGNOSIS — R06.02 SHORTNESS OF BREATH: ICD-10-CM

## 2019-11-21 DIAGNOSIS — R07.89 CHEST WALL PAIN: Primary | ICD-10-CM

## 2019-11-21 DIAGNOSIS — I25.10 CORONARY ARTERY DISEASE INVOLVING NATIVE CORONARY ARTERY OF NATIVE HEART WITHOUT ANGINA PECTORIS: ICD-10-CM

## 2019-11-21 DIAGNOSIS — I25.5 ISCHEMIC CARDIOMYOPATHY: ICD-10-CM

## 2019-11-21 PROCEDURE — 99214 OFFICE O/P EST MOD 30 MIN: CPT | Performed by: NURSE PRACTITIONER

## 2019-11-21 PROCEDURE — 99406 BEHAV CHNG SMOKING 3-10 MIN: CPT | Performed by: NURSE PRACTITIONER

## 2019-11-21 RX ORDER — CLOPIDOGREL BISULFATE 75 MG/1
75 TABLET ORAL DAILY
Qty: 30 TABLET | Refills: 11 | Status: SHIPPED | OUTPATIENT
Start: 2019-11-21 | End: 2020-11-16

## 2019-11-21 RX ORDER — NAPROXEN 500 MG/1
500 TABLET ORAL 2 TIMES DAILY WITH MEALS
Qty: 60 TABLET | Refills: 11 | Status: SHIPPED | OUTPATIENT
Start: 2019-11-21 | End: 2020-06-16

## 2019-11-21 RX ORDER — CYCLOBENZAPRINE HCL 10 MG
10 TABLET ORAL 3 TIMES DAILY PRN
Qty: 30 TABLET | Refills: 1 | Status: SHIPPED | OUTPATIENT
Start: 2019-11-21 | End: 2019-12-16

## 2019-11-21 RX ORDER — NAPROXEN SODIUM 550 MG/1
550 TABLET ORAL 2 TIMES DAILY WITH MEALS
Qty: 20 TABLET | Refills: 1 | Status: SHIPPED | OUTPATIENT
Start: 2019-11-21 | End: 2019-11-21

## 2019-11-21 RX ORDER — ISOSORBIDE MONONITRATE 60 MG/1
60 TABLET, EXTENDED RELEASE ORAL EVERY MORNING
Qty: 30 TABLET | Refills: 11 | Status: SHIPPED | OUTPATIENT
Start: 2019-11-21 | End: 2020-06-16 | Stop reason: SDUPTHER

## 2019-11-21 NOTE — PROGRESS NOTES
Subjective     Navi Hanley is a 44 y.o. male who presents to day for Coronary Artery Disease.    CHIEF COMPLIANT  Chief Complaint   Patient presents with   • Coronary Artery Disease     Problem list  1.  Coronary artery disease  1.1 cardiac catheterization April 2019 demonstrated high-grade three-vessel disease with an EF of 30% with recommendations for coronary artery bypass grafting  1.2 coronary artery bypass grafting, three-vessel, April 2019 per patient report, inadequate data  2.  Ischemic cardiomyopathy  2.1 EF estimated 30% by catheterization April 2019  3.  Dyslipidemia  4.  Chronic tobacco use    Active Problems:  Problem List Items Addressed This Visit        Cardiovascular and Mediastinum    Ischemic cardiomyopathy    Relevant Medications    isosorbide mononitrate (IMDUR) 60 MG 24 hr tablet    clopidogrel (PLAVIX) 75 MG tablet    Other Relevant Orders    [facility]    CBC & Differential    Basic Metabolic Panel    Coronary artery disease involving native coronary artery of native heart without angina pectoris    Relevant Medications    isosorbide mononitrate (IMDUR) 60 MG 24 hr tablet    clopidogrel (PLAVIX) 75 MG tablet    Other Relevant Orders    [facility]    CBC & Differential    Basic Metabolic Panel    Chronic systolic congestive heart failure (CMS/HCC)    Relevant Medications    isosorbide mononitrate (IMDUR) 60 MG 24 hr tablet    clopidogrel (PLAVIX) 75 MG tablet       Respiratory    Shortness of breath       Nervous and Auditory    Chest pain      Other Visit Diagnoses     Chest wall pain    -  Primary    Relevant Medications    cyclobenzaprine (FLEXERIL) 10 MG tablet    isosorbide mononitrate (IMDUR) 60 MG 24 hr tablet    Other Relevant Orders    [facility]    CBC & Differential    Basic Metabolic Panel          HPI  HPI  Mr. Hanley is a 44-year-old day for coronary artery disease.  Patient had recent CABG three-vessel bypass in April 2019.  Reports having  chest pain ever since he had a echo from them pressing on his chest.  States that is really sore to the touch.  In addition patient reports still having some chest tightness at times and also associated shortness of air.  Symptoms are similar to the symptoms he had prior to bypass surgery. patient's major complaint today is fatigue.  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  Other associated symptoms include lightheadedness and dizziness.  Does report PND and when she wakes up having to take deep breaths on rare occasions.  Patient denies any orthopnea, lower extremity edema, palpitations, syncope, or other neurological changes.  Patient states that they were unable to graft all 4 vessels that was supposed to be grafted due to the amount of time that he was open on the table.  Recently repeated stress test that identified distal anterior wall ischemia and large posterior lateral infarct with minimal pacheco-infarct ischemia.  Ejection fraction was estimated to be 36% with hypokinesis to akinesis of the inferior lateral wall.  However on the echocardiogram identified an ejection fraction of 40 to 45% with grade 1 diastolic dysfunction.    PRIOR MEDS  Current Outpatient Medications on File Prior to Visit   Medication Sig Dispense Refill   • amLODIPine (NORVASC) 2.5 MG tablet Take 1 tablet by mouth Daily. 30 tablet 11   • ASPIRIN LOW DOSE 81 MG EC tablet Take 81 mg by mouth Daily.     • atorvastatin (LIPITOR) 80 MG tablet Take 80 mg by mouth Daily.     • furosemide (LASIX) 20 MG tablet Take 20 mg by mouth Daily.     • MAPAP 325 MG tablet 325 mg As Needed.     • metoprolol succinate XL (TOPROL-XL) 25 MG 24 hr tablet Take 25 mg by mouth Daily.     • nitroglycerin (NITROSTAT) 0.4 MG SL tablet 1 under the tongue as needed for angina, may repeat q5mins for up three doses 100 tablet 11   •  potassium chloride (K-DUR) 10 MEQ CR tablet 2 (Two) Times a Day.     • SENNA PLUS 8.6-50 MG per tablet Daily.       No current facility-administered medications on file prior to visit.        ALLERGIES  Patient has no known allergies.    HISTORY  Past Medical History:   Diagnosis Date   • Coronary artery disease    • Hyperlipidemia    • Hypertension    • Myocardial infarction (CMS/HCC)        Social History     Socioeconomic History   • Marital status:      Spouse name: Not on file   • Number of children: Not on file   • Years of education: Not on file   • Highest education level: Not on file   Tobacco Use   • Smoking status: Current Every Day Smoker     Packs/day: 1.00     Years: 30.00     Pack years: 30.00     Types: Cigarettes   • Smokeless tobacco: Former User   Substance and Sexual Activity   • Alcohol use: No     Frequency: Never   • Drug use: No   • Sexual activity: Defer       Family History   Problem Relation Age of Onset   • Heart disease Mother    • Cancer Mother    • Hyperlipidemia Father    • Hypertension Father        Review of Systems   Constitutional: Positive for fatigue (no energy, tire very quickly, 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 Negative for chills and fever.   HENT: Negative.  Negative for congestion.    Respiratory: Positive for chest tightness (on and off) and shortness of breath (on and off).    Cardiovascular: Positive for chest pain (states from having echo). Negative for palpitations and leg swelling.   Gastrointestinal: Positive for diarrhea. Negative for abdominal pain, blood in stool, constipation, nausea and vomiting.   Genitourinary: Negative.    Musculoskeletal: Negative.  Negative for back pain and neck pain.   Neurological: Positive for dizziness (occasionally) and light-headedness (occasionally). Negative for syncope.   Hematological: Negative.  Does not bruise/bleed easily.   Psychiatric/Behavioral: Positive for sleep disturbance (reports wakes  "with soa at times).       Objective     VITALS: /79 (BP Location: Left arm, Patient Position: Sitting)   Pulse 88   Ht 175.3 cm (69\")   Wt 91.6 kg (202 lb)   SpO2 98%   BMI 29.83 kg/m²     LABS:   Lab Results (most recent)     None          IMAGING:   No Images in the past 120 days found..    EXAM:  Physical Exam   Constitutional: He is oriented to person, place, and time. Vital signs are normal. He appears well-developed and well-nourished.   HENT:   Head: Normocephalic.   Eyes: EOM are normal. Pupils are equal, round, and reactive to light.   Neck: Trachea normal. Neck supple. No JVD present. Carotid bruit is not present. No thyromegaly present.   Cardiovascular: Normal rate, regular rhythm, normal heart sounds and intact distal pulses. Exam reveals no gallop.   No murmur heard.  Pulses:       Radial pulses are 2+ on the right side, and 2+ on the left side.        Posterior tibial pulses are 2+ on the right side, and 2+ on the left side.   Pulmonary/Chest: Effort normal and breath sounds normal.   Abdominal: Soft. Bowel sounds are normal. He exhibits no distension. There is no tenderness.   Musculoskeletal: Normal range of motion. He exhibits tenderness (chest). He exhibits no edema.        Arms:       Legs:  Neurological: He is alert and oriented to person, place, and time. He has normal strength. No cranial nerve deficit or sensory deficit.   Skin: Skin is warm, dry and intact.   Psychiatric: He has a normal mood and affect. His behavior is normal. Judgment and thought content normal.   Nursing note and vitals reviewed.      Procedure   Procedures       Assessment/Plan    Diagnosis Plan   1. Chest wall pain  cyclobenzaprine (FLEXERIL) 10 MG tablet    isosorbide mononitrate (IMDUR) 60 MG 24 hr tablet    [facility]    CBC & Differential    Basic Metabolic Panel   2. Coronary artery disease involving native coronary artery of native heart without angina pectoris  isosorbide mononitrate (IMDUR) " 60 MG 24 hr tablet    [facility]    CBC & Differential    Basic Metabolic Panel   3. Ischemic cardiomyopathy  isosorbide mononitrate (IMDUR) 60 MG 24 hr tablet    [facility]    CBC & Differential    Basic Metabolic Panel   4. Chronic systolic congestive heart failure (CMS/HCC)     5. Shortness of breath     6. Precordial pain     1.  Patient is status post CABG three-vessel disease in April 2019.  Patient still having significant symptoms and had us had a stress test and echocardiogram repeated.  The stress test was positive and identified distal anterior wall ischemia and large posterior lateral infarct with minimal pacheco-infarct ischemia.  Ejection fraction was estimated to be 36% with hypokinesis to akinesis of the inferior lateral wall.   Action fraction is between 40 to 45% on echocardiogram.  Due to continued ischemia on patient's stress test I feel is appropriate to look for further ischemic causes of his symptoms.  Patient will undergo left heart catheterization.  Patient wishes to proceed with the procedure.  2.  Seizure labs will be ordered and patient requested to have them obtain actually 1 to 2 weeks prior to heart cath.  3.  Patient will be started on Plavix 75 mg daily.  4.  Patient's Imdur will be increased to 60 mg daily.  5.  We will per scribe Flexeril and naproxen for patient's musculoskeletal type pain related from the echocardiogram to see if it will help resize the chest pain that he is reporting.  6.  Patient informed of signs of symptoms ACS and advised to seek emergent care for any new or worsening symptoms.  Patient also advised to seek sooner follow-up as needed.          Return if symptoms worsen or fail to improve, for 1-2 weeks after heart cath.    Navi was seen today for coronary artery disease.    Diagnoses and all orders for this visit:    Chest wall pain  -     cyclobenzaprine (FLEXERIL) 10 MG tablet; Take 1 tablet by mouth 3 (Three) Times a Day As Needed for  Muscle Spasms.  -     Discontinue: naproxen sodium (ANAPROX DS) 550 MG tablet; Take 1 tablet by mouth 2 (Two) Times a Day With Meals.  -     isosorbide mononitrate (IMDUR) 60 MG 24 hr tablet; Take 1 tablet by mouth Every Morning.  -     [facility]; Future  -     CBC & Differential; Future  -     Basic Metabolic Panel; Future    Coronary artery disease involving native coronary artery of native heart without angina pectoris  -     isosorbide mononitrate (IMDUR) 60 MG 24 hr tablet; Take 1 tablet by mouth Every Morning.  -     [facility]; Future  -     CBC & Differential; Future  -     Basic Metabolic Panel; Future    Ischemic cardiomyopathy  -     isosorbide mononitrate (IMDUR) 60 MG 24 hr tablet; Take 1 tablet by mouth Every Morning.  -     [facility]; Future  -     CBC & Differential; Future  -     Basic Metabolic Panel; Future    Chronic systolic congestive heart failure (CMS/HCC)    Shortness of breath    Precordial pain    Other orders  -     clopidogrel (PLAVIX) 75 MG tablet; Take 1 tablet by mouth Daily.        Navi Hanley is a current cigarettes user.  He currently smokes and chews 1 pack of cigarettes and packs of cigarettes per day for a duration of 30 years. I have educated him on the risk of diseases from using tobacco products such as cancer, COPD and heart diease.     I advised him to quit and he is willing to quit. We have discussed the following method/s for tobacco cessation:  Counseling.     I spent 3  minutes counseling the patient.          Patient's Body mass index is 29.83 kg/m². BMI is above normal parameters. Recommendations include: educational material.           MEDS ORDERED DURING VISIT:  New Medications Ordered This Visit   Medications   • cyclobenzaprine (FLEXERIL) 10 MG tablet     Sig: Take 1 tablet by mouth 3 (Three) Times a Day As Needed for Muscle Spasms.     Dispense:  30 tablet     Refill:  1   • isosorbide mononitrate (IMDUR) 60 MG 24 hr tablet      Sig: Take 1 tablet by mouth Every Morning.     Dispense:  30 tablet     Refill:  11   • clopidogrel (PLAVIX) 75 MG tablet     Sig: Take 1 tablet by mouth Daily.     Dispense:  30 tablet     Refill:  11           This document has been electronically signed by Anderson Palmer Jr., APRMAHAMED  November 24, 2019 9:55 PM

## 2019-11-21 NOTE — PATIENT INSTRUCTIONS
"Fat and Cholesterol Restricted Eating Plan  Getting too much fat and cholesterol in your diet may cause health problems. Choosing the right foods helps keep your fat and cholesterol at normal levels. This can keep you from getting certain diseases.  Your doctor may recommend an eating plan that includes:  · Total fat: ______% or less of total calories a day.  · Saturated fat: ______% or less of total calories a day.  · Cholesterol: less than _________mg a day.  · Fiber: ______g a day.  What are tips for following this plan?  Meal planning  · At meals, divide your plate into four equal parts:  ? Fill one-half of your plate with vegetables and green salads.  ? Fill one-fourth of your plate with whole grains.  ? Fill one-fourth of your plate with low-fat (lean) protein foods.  · Eat fish that is high in omega-3 fats at least two times a week. This includes mackerel, tuna, sardines, and salmon.  · Eat foods that are high in fiber, such as whole grains, beans, apples, broccoli, carrots, peas, and barley.  General tips    · Work with your doctor to lose weight if you need to.  · Avoid:  ? Foods with added sugar.  ? Fried foods.  ? Foods with partially hydrogenated oils.  · Limit alcohol intake to no more than 1 drink a day for nonpregnant women and 2 drinks a day for men. One drink equals 12 oz of beer, 5 oz of wine, or 1½ oz of hard liquor.  Reading food labels  · Check food labels for:  ? Trans fats.  ? Partially hydrogenated oils.  ? Saturated fat (g) in each serving.  ? Cholesterol (mg) in each serving.  ? Fiber (g) in each serving.  · Choose foods with healthy fats, such as:  ? Monounsaturated fats.  ? Polyunsaturated fats.  ? Omega-3 fats.  · Choose grain products that have whole grains. Look for the word \"whole\" as the first word in the ingredient list.  Cooking  · Cook foods using low-fat methods. These include baking, boiling, grilling, and broiling.  · Eat more home-cooked foods. Eat at restaurants and buffets " less often.  · Avoid cooking using saturated fats, such as butter, cream, palm oil, palm kernel oil, and coconut oil.  Recommended foods    Fruits  · All fresh, canned (in natural juice), or frozen fruits.  Vegetables  · Fresh or frozen vegetables (raw, steamed, roasted, or grilled). Green salads.  Grains  · Whole grains, such as whole wheat or whole grain breads, crackers, cereals, and pasta. Unsweetened oatmeal, bulgur, barley, quinoa, or brown rice. Corn or whole wheat flour tortillas.  Meats and other protein foods  · Ground beef (85% or leaner), grass-fed beef, or beef trimmed of fat. Skinless chicken or turkey. Ground chicken or turkey. Pork trimmed of fat. All fish and seafood. Egg whites. Dried beans, peas, or lentils. Unsalted nuts or seeds. Unsalted canned beans. Nut butters without added sugar or oil.  Dairy  · Low-fat or nonfat dairy products, such as skim or 1% milk, 2% or reduced-fat cheeses, low-fat and fat-free ricotta or cottage cheese, or plain low-fat and nonfat yogurt.  Fats and oils  · Tub margarine without trans fats. Light or reduced-fat mayonnaise and salad dressings. Avocado. Olive, canola, sesame, or safflower oils.  The items listed above may not be a complete list of foods and beverages you can eat. Contact a dietitian for more information.  Foods to avoid  Fruits  · Canned fruit in heavy syrup. Fruit in cream or butter sauce. Fried fruit.  Vegetables  · Vegetables cooked in cheese, cream, or butter sauce. Fried vegetables.  Grains  · White bread. White pasta. White rice. Cornbread. Bagels, pastries, and croissants. Crackers and snack foods that contain trans fat and hydrogenated oils.  Meats and other protein foods  · Fatty cuts of meat. Ribs, chicken wings, alfonso, sausage, bologna, salami, chitterlings, fatback, hot dogs, bratwurst, and packaged lunch meats. Liver and organ meats. Whole eggs and egg yolks. Chicken and turkey with skin. Fried meat.  Dairy  · Whole or 2% milk, cream,  half-and-half, and cream cheese. Whole milk cheeses. Whole-fat or sweetened yogurt. Full-fat cheeses. Nondairy creamers and whipped toppings. Processed cheese, cheese spreads, and cheese curds.  Beverages  · Alcohol. Sugar-sweetened drinks such as sodas, lemonade, and fruit drinks.  Fats and oils  · Butter, stick margarine, lard, shortening, ghee, or alfonso fat. Coconut, palm kernel, and palm oils.  Sweets and desserts  · Corn syrup, sugars, honey, and molasses. Candy. Jam and jelly. Syrup. Sweetened cereals. Cookies, pies, cakes, donuts, muffins, and ice cream.  The items listed above may not be a complete list of foods and beverages you should avoid. Contact a dietitian for more information.  Summary  · Choosing the right foods helps keep your fat and cholesterol at normal levels. This can keep you from getting certain diseases.  · At meals, fill one-half of your plate with vegetables and green salads.  · Eat high-fiber foods, like whole grains, beans, apples, carrots, peas, and barley.  · Limit added sugar, saturated fats, alcohol, and fried foods.  This information is not intended to replace advice given to you by your health care provider. Make sure you discuss any questions you have with your health care provider.  Document Released: 06/18/2013 Document Revised: 08/21/2019 Document Reviewed: 09/04/2018  [employer] Interactive Patient Education © 2019 [employer]  Steps to Quit Smoking    Smoking tobacco can be bad for your health. It can also affect almost every organ in your body. Smoking puts you and people around you at risk for many serious long-lasting (chronic) diseases. Quitting smoking is hard, but it is one of the best things that you can do for your health. It is never too late to quit.  What are the benefits of quitting smoking?  When you quit smoking, you lower your risk for getting serious diseases and conditions. They can include:  · Lung cancer or lung disease.  · Heart  disease.  · Stroke.  · Heart attack.  · Not being able to have children (infertility).  · Weak bones (osteoporosis) and broken bones (fractures).  If you have coughing, wheezing, and shortness of breath, those symptoms may get better when you quit. You may also get sick less often. If you are pregnant, quitting smoking can help to lower your chances of having a baby of low birth weight.  What can I do to help me quit smoking?  Talk with your doctor about what can help you quit smoking. Some things you can do (strategies) include:  · Quitting smoking totally, instead of slowly cutting back how much you smoke over a period of time.  · Going to in-person counseling. You are more likely to quit if you go to many counseling sessions.  · Using resources and support systems, such as:  ? Online chats with a counselor.  ? Phone quitlines.  ? Printed self-help materials.  ? Support groups or group counseling.  ? Text messaging programs.  ? Mobile phone apps or applications.  · Taking medicines. Some of these medicines may have nicotine in them. If you are pregnant or breastfeeding, do not take any medicines to quit smoking unless your doctor says it is okay. Talk with your doctor about counseling or other things that can help you.  Talk with your doctor about using more than one strategy at the same time, such as taking medicines while you are also going to in-person counseling. This can help make quitting easier.  What things can I do to make it easier to quit?  Quitting smoking might feel very hard at first, but there is a lot that you can do to make it easier. Take these steps:  · Talk to your family and friends. Ask them to support and encourage you.  · Call phone quitlines, reach out to support groups, or work with a counselor.  · Ask people who smoke to not smoke around you.  · Avoid places that make you want (trigger) to smoke, such as:  ? Bars.  ? Parties.  ? Smoke-break areas at work.  · Spend time with people who do  not smoke.  · Lower the stress in your life. Stress can make you want to smoke. Try these things to help your stress:  ? Getting regular exercise.  ? Deep-breathing exercises.  ? Yoga.  ? Meditating.  ? Doing a body scan. To do this, close your eyes, focus on one area of your body at a time from head to toe, and notice which parts of your body are tense. Try to relax the muscles in those areas.  · Download or buy apps on your mobile phone or tablet that can help you stick to your quit plan. There are many free apps, such as QuitGuide from the CDC (Centers for Disease Control and Prevention). You can find more support from smokefree.gov and other websites.  This information is not intended to replace advice given to you by your health care provider. Make sure you discuss any questions you have with your health care provider.  Document Released: 10/14/2010 Document Revised: 08/15/2017 Document Reviewed: 05/03/2016  [employer] Interactive Patient Education © 2019 [employer]  BMI for Adults    Body mass index (BMI) is a number that is calculated from a person's weight and height. BMI may help to estimate how much of a person's weight is composed of fat. BMI can help identify those who may be at higher risk for certain medical problems.  How is BMI used with adults?  BMI is used as a screening tool to identify possible weight problems. It is used to check whether a person is obese, overweight, healthy weight, or underweight.  How is BMI calculated?  BMI measures your weight and compares it to your height. This can be done either in English ([place] or metric measurements. Note that charts are available to help you find your BMI quickly and easily without having to do these calculations yourself.  To calculate your BMI in English ([place] measurements, your health care provider will:  1. Measure your weight in pounds (lb).  2. Multiply the number of pounds by 703.  ? For example, for a person who weighs 180 lb, multiply that  "number by 703, which equals 126,540.  3. Measure your height in inches (in). Then multiply that number by itself to get a measurement called \"inches squared.\"  ? For example, for a person who is 70 in tall, the \"inches squared\" measurement is 70 in x 70 in, which equals 4900 inches squared.  4. Divide the total from Step 2 (number of lb x 703) by the total from Step 3 (inches squared): 126,540 ÷ 4900 = 25.8. This is your BMI.  To calculate your BMI in metric measurements, your health care provider will:  1. Measure your weight in kilograms (kg).  2. Measure your height in meters (m). Then multiply that number by itself to get a measurement called \"meters squared.\"  ? For example, for a person who is 1.75 m tall, the \"meters squared\" measurement is 1.75 m x 1.75 m, which is equal to 3.1 meters squared.  3. Divide the number of kilograms (your weight) by the meters squared number. In this example: 70 ÷ 3.1 = 22.6. This is your BMI.  How is BMI interpreted?  To interpret your results, your health care provider will use BMI charts to identify whether you are underweight, normal weight, overweight, or obese. The following guidelines will be used:  · Underweight: BMI less than 18.5.  · Normal weight: BMI between 18.5 and 24.9.  · Overweight: BMI between 25 and 29.9.  · Obese: BMI of 30 and above.  Please note:  · Weight includes both fat and muscle, so someone with a muscular build, such as an athlete, may have a BMI that is higher than 24.9. In cases like these, BMI is not an accurate measure of body fat.  · To determine if excess body fat is the cause of a BMI of 25 or higher, further assessments may need to be done by a health care provider.  · BMI is usually interpreted in the same way for men and women.  Why is BMI a useful tool?  BMI is useful in two ways:  · Identifying a weight problem that may be related to a medical condition, or that may increase the risk for medical problems.  · Promoting lifestyle and diet " changes in order to reach a healthy weight.  Summary  · Body mass index (BMI) is a number that is calculated from a person's weight and height.  · BMI may help to estimate how much of a person's weight is composed of fat. BMI can help identify those who may be at higher risk for certain medical problems.  · BMI can be measured using English measurements or metric measurements.  · To interpret your results, your health care provider will use BMI charts to identify whether you are underweight, normal weight, overweight, or obese.  This information is not intended to replace advice given to you by your health care provider. Make sure you discuss any questions you have with your health care provider.  Document Released: 08/29/2005 Document Revised: 10/31/2018 Document Reviewed: 10/31/2018  [employer] Interactive Patient Education © 2019 [employer]    Acute Coronary Syndrome    Acute coronary syndrome (ACS) is a serious problem in which there is suddenly not enough blood and oxygen reaching the heart. ACS can result in chest pain or a heart attack.  This condition is a medical emergency. If you have any symptoms of this condition, get help right away.  What are the causes?  This condition may be caused by:  · Buildup of fat and cholesterol inside of the arteries (atherosclerosis). This is the most common cause. The buildup (plaque) can cause blood vessels in the heart (coronary arteries) to become narrow or blocked, which reduces blood flow to the heart. Plaque can also break off and lead to a clot, which can block an artery and cause a heart attack or stroke.  · Sudden tightening of the muscles around the coronary arteries (coronary spasm).  · Tearing of a coronary artery (spontaneous coronary artery dissection).  · Very low blood pressure (hypotension).  · An abnormal heartbeat (arrhythmia).  · Other medical conditions that cause a decrease of oxygen to the heart, such as anemiaorrespiratory failure.  · Using cocaine  or methamphetamine.  What increases the risk?  The following factors may make you more likely to develop this condition:  · Age. The risk for ACS increases as you get older.  · History of chest pain, heart attack, peripheral artery disease, or stroke.  · Having taken chemotherapy or immune-suppressing medicines.  · Being male.  · Family history of chest pain, heart disease, or stroke.  · Smoking.  · Not exercising enough.  · Being overweight.  · High cholesterol.  · High blood pressure (hypertension).  · Diabetes.  · Excessive alcohol use.  What are the signs or symptoms?  Common symptoms of this condition include:  · Chest pain. The pain may last a long time, or it may stop and come back (recur). It may feel like:  ? Crushing or squeezing.  ? Tightness, pressure, fullness, or heaviness.  · Arm, neck, jaw, or back pain.  · Heartburn or indigestion.  · Shortness of breath.  · Nausea.  · Sudden cold sweats.  · Light-headedness.  · Dizziness, or passing out.  · Tiredness (fatigue).  Sometimes there are no symptoms.  How is this diagnosed?  This condition may be diagnosed based on:  · Your medical history and symptoms.  · An electrocardiogram (ECG). This imaging test measures the heart's electrical activity.  · Blood tests. Cardiac blood tests may need to be repeated at designated time intervals.  · Chest X-ray.  · A CT scan of the chest.  · A coronary angiogram. This is a procedure in which dye is injected into the bloodstream and then X-rays are taken to show if there is a blockage in a coronary artery.  · Exercise stress testing.  · Echocardiography. This is a test that uses sound waves to produce detailed images of the heart.  How is this treated?  The treatment is to restore blood flow to the heart as soon as possible. Treatment for this condition may include:  · Oxygen therapy.  · Medicines, such as:  ? Antiplatelet medicines and blood-thinning medicines, such as aspirin. These help prevent blood  clots.  ? Medicine that dissolves any blood clots (fibrinolytic therapy).  ? Blood pressure medicines.  ? Nitroglycerin. This helps relieve chest pain and widens blood vessels to improve blood flow.  ? Pain medicine.  ? Cholesterol-lowering medicine.  · Surgery, such as:  ? Coronary angioplasty with stent placement. This involves placing a small piece of metal that looks like mesh or a spring into a narrow coronary artery. This widens the artery and keep it open.  ? Coronary artery bypass surgery. This involves taking a section of a blood vessel from a different part of your body, and placing it on the blocked coronary artery to allow blood to flow around (bypass) the blockage.  · Cardiac rehabilitation. This is a program that helps improve your health and well-being. It includes exercise training, education, and counseling to help you recover.  Follow these instructions at home:  Eating and drinking  · Eat a heart-healthy diet that includes whole grains, fruits and vegetables, lean proteins, and low-fat or nonfat dairy products.  · Limit how much salt (sodium) you eat as told by your health care provider. Follow instructions from your health care provider about any other eating or drinking restrictions, such as limiting foods that are high in fat and processed sugars.  · Use healthy cooking methods such as roasting, grilling, broiling, baking, poaching, steaming, or stir-frying.  · Talk with a dietitian to learn about healthy cooking methods and how to eat less sodium.  Medicines  · Take over-the-counter and prescription medicines only as told by your health care provider.  · Do not take these medicines unless your health care provider approves:  ? Vitamin supplements that contain vitamin A or vitamin E.  ? Nonsteroidal anti-inflammatory drugs (NSAIDs), such as ibuprofen, naproxen, or celecoxib.  ? Hormone replacement therapy that contains estrogen.  If you are taking blood thinners:  · Talk with your health care  provider before you take any medicines that contain aspirin or NSAIDs. These medicines increase your risk for dangerous bleeding.  · Take your medicine exactly as told, at the same time every day.  · Avoid activities that could cause injury or bruising, and follow instructions about how to prevent falls.  · Wear a medical alert bracelet, and carry a card that lists what medicines you take.  Activity  · Join a cardiac rehabilitation program. An exercise plan will be developed for you.  · Ask your health care provider:  ? What activities and exercises are safe for you.  ? If you should follow specific instructions about lifting, driving, or climbing stairs.  Lifestyle  · Do not use any products that contain nicotine or tobacco, such as cigarettes and e-cigarettes. If you need help quitting, ask your health care provider.  · If your health care provider says that alcohol is safe for you, limit your alcohol intake to no more than 1 drink a day. One drink equals 12 oz of beer, 5 oz of wine, or 1½ oz of hard liquor.  · Maintain a healthy weight. If you need to lose weight, work with your health care provider to do so safely.  General instructions  · Tell all the health care providers who care for you about your heart condition, including your dentist. This may affect the medicines or treatment you receive.  · Manage any other health conditions you have, such as hypertension or diabetes. These conditions affect your heart.  · Learn ways to manage stress.  · Get screened for depression, and get mental health treatment if you need it. People with ACS are at higher risk for depression.  · Keep your vaccinations up to date. Get the flu shot (influenza vaccine) every year.  · If directed, monitor your blood pressure at home.  · Keep all follow-up visits as told by your health care provider. This is important.  Contact a health care provider if:  · You feel overwhelmed or sad.  · You have trouble doing your daily  activities.  Get help right away if:  · You have pain in your chest, neck, arm, jaw, stomach, or back that recurs, and:  ? It lasts for more than a few minutes.  ? It is not relieved by taking the medicineyour health care provider prescribed.  · You have unexplained:  ? Heavy sweating.  ? Heartburn or indigestion.  ? Nausea or vomiting.  ? Shortness of breath.  ? Difficulty breathing.  ? Fatigue.  ? Nervousness or anxiety.  ? Weakness.  ? Diarrhea.  ? Dark stools or blood in your stool.  · You have sudden light-headedness or dizziness.  · Your blood pressure is higher than 180/120.  · You faint.  · You have thoughts about hurting yourself.  These symptoms may represent a serious problem that is an emergency. Do not wait to see if the symptoms will go away. Get medical help right away. Call your local emergency services (911 in the [place] Do not drive yourself to the hospital.   If you ever feel like you may hurt yourself or others, or have thoughts about taking your own life, get help right away. You can go to your nearest emergency department or call:  · Emergency services (911 in the [place]  · A suicide crisis helpline, such as the National Suicide Prevention Lifeline at 1-494.457.1831. This is open 24 hours a day.  Summary  · Acute coronary syndrome (ACS) is when there is not enough blood and oxygen being supplied to the heart. ACS can result in chest pain or a heart attack.  · Acute coronary syndrome is a medical emergency. If you have any symptoms of this condition, get help right away.  · Treatment includes medicines and procedures to open the blocked arteries and restore blood flow.  This information is not intended to replace advice given to you by your health care provider. Make sure you discuss any questions you have with your health care provider.  Document Released: 12/18/2006 Document Revised: 08/28/2018 Document Reviewed: 08/28/2018  [employer] Interactive Patient Education © 2019 [employer]

## 2019-12-03 ENCOUNTER — OUTSIDE FACILITY SERVICE (OUTPATIENT)
Dept: CARDIOLOGY | Facility: CLINIC | Age: 44
End: 2019-12-03

## 2019-12-03 DIAGNOSIS — I25.10 CORONARY ARTERY DISEASE INVOLVING NATIVE CORONARY ARTERY OF NATIVE HEART WITHOUT ANGINA PECTORIS: ICD-10-CM

## 2019-12-03 DIAGNOSIS — I25.5 ISCHEMIC CARDIOMYOPATHY: ICD-10-CM

## 2019-12-03 DIAGNOSIS — R07.89 CHEST WALL PAIN: ICD-10-CM

## 2019-12-03 PROCEDURE — 92928 PRQ TCAT PLMT NTRAC ST 1 LES: CPT | Performed by: INTERNAL MEDICINE

## 2019-12-03 PROCEDURE — 93459 L HRT ART/GRFT ANGIO: CPT | Performed by: INTERNAL MEDICINE

## 2019-12-16 ENCOUNTER — OFFICE VISIT (OUTPATIENT)
Dept: CARDIOLOGY | Facility: CLINIC | Age: 44
End: 2019-12-16

## 2019-12-16 VITALS
HEIGHT: 69 IN | BODY MASS INDEX: 29.62 KG/M2 | OXYGEN SATURATION: 99 % | HEART RATE: 98 BPM | SYSTOLIC BLOOD PRESSURE: 118 MMHG | DIASTOLIC BLOOD PRESSURE: 80 MMHG | WEIGHT: 200 LBS

## 2019-12-16 DIAGNOSIS — R07.89 CHEST WALL PAIN: ICD-10-CM

## 2019-12-16 DIAGNOSIS — R07.2 PRECORDIAL PAIN: Primary | ICD-10-CM

## 2019-12-16 PROCEDURE — 99213 OFFICE O/P EST LOW 20 MIN: CPT | Performed by: NURSE PRACTITIONER

## 2019-12-16 RX ORDER — NICOTINE 21 MG/24HR
1 PATCH, TRANSDERMAL 24 HOURS TRANSDERMAL EVERY 24 HOURS
COMMUNITY
End: 2020-06-16

## 2019-12-16 RX ORDER — CYCLOBENZAPRINE HCL 10 MG
10 TABLET ORAL 2 TIMES DAILY PRN
Qty: 60 TABLET | Refills: 3 | Status: SHIPPED | OUTPATIENT
Start: 2019-12-16 | End: 2019-12-19 | Stop reason: SDUPTHER

## 2019-12-16 RX ORDER — AMLODIPINE BESYLATE 2.5 MG/1
2.5 TABLET ORAL DAILY
Qty: 30 TABLET | Refills: 11 | Status: SHIPPED | OUTPATIENT
Start: 2019-12-16 | End: 2019-12-19 | Stop reason: SDUPTHER

## 2019-12-16 NOTE — PROGRESS NOTES
Subjective     Navi Hanley is a 44 y.o. male who presents to day for Coronary Artery Disease (12-3-19 heart cath x 2 stents) and Fatigue.    CHIEF COMPLIANT  Chief Complaint   Patient presents with   • Coronary Artery Disease     12-3-19 heart cath x 2 stents   • Fatigue        Problem list  1.  Coronary artery disease  1.1 cardiac catheterization April 2019 demonstrated high-grade three-vessel disease with an EF of 30% with recommendations for coronary artery bypass grafting  1.2 coronary artery bypass grafting, three-vessel, April 2019 per patient report, inadequate data  2.  Ischemic cardiomyopathy  2.1 EF estimated 30% by catheterization April 2019  3.  Dyslipidemia  4.  Chronic tobacco use  5. Heart catheretization  12- x 2 cardiac stents  Active Problems:  Problem List Items Addressed This Visit        Nervous and Auditory    Chest pain - Primary    Relevant Medications    amLODIPine (NORVASC) 2.5 MG tablet      Other Visit Diagnoses     Chest wall pain        Relevant Medications    cyclobenzaprine (FLEXERIL) 10 MG tablet          HPI  HPI  Mr. Hanley is a 44-year-old male who is being followed up today for coronary artery disease in which he had a cath 12/3/19 by Dr. Aguirre which she received 2 stents.  Patient states even before the heart cath with initiation of Flexeril and Ranexa that his symptoms had decreased by about 50%.  Still states that he does get some symptoms especially in situations where he is carrying wood and the area that was previously mentioned that was real sensitive on his chest has actually reduced significantly since last visit.  States that he is doing relatively well from the cardiac standpoint at this time.  Ever he still states that he does have some chest tightness and shortness of breath if he overexerts himself but also associated with dizziness.  Patient also reports that he still has a certain amount of fatigue in which he tires easily.  However he does deny any  chest pain, lower extremity edema, palpitations, PND, orthopnea, decrease in exercise tolerance, syncope, or neurological changes.  Also denies any numbness in the right hand from the heart catheterization.  PRIOR MEDS  Current Outpatient Medications on File Prior to Visit   Medication Sig Dispense Refill   • ASPIRIN LOW DOSE 81 MG EC tablet Take 81 mg by mouth Daily.     • atorvastatin (LIPITOR) 80 MG tablet Take 80 mg by mouth Daily.     • clopidogrel (PLAVIX) 75 MG tablet Take 1 tablet by mouth Daily. 30 tablet 11   • furosemide (LASIX) 20 MG tablet Take 20 mg by mouth Daily.     • isosorbide mononitrate (IMDUR) 60 MG 24 hr tablet Take 1 tablet by mouth Every Morning. 30 tablet 11   • MAPAP 325 MG tablet 325 mg As Needed.     • metoprolol succinate XL (TOPROL-XL) 25 MG 24 hr tablet Take 25 mg by mouth Daily.     • naproxen (NAPROSYN) 500 MG tablet Take 1 tablet by mouth 2 (Two) Times a Day With Meals. 60 tablet 11   • nicotine (NICODERM CQ) 21 MG/24HR patch Place 1 patch on the skin as directed by provider Daily.     • nitroglycerin (NITROSTAT) 0.4 MG SL tablet 1 under the tongue as needed for angina, may repeat q5mins for up three doses 100 tablet 11   • potassium chloride (K-DUR) 10 MEQ CR tablet 2 (Two) Times a Day.     • SENNA PLUS 8.6-50 MG per tablet 2 (Two) Times a Day.       No current facility-administered medications on file prior to visit.        ALLERGIES  Patient has no known allergies.    HISTORY  Past Medical History:   Diagnosis Date   • Coronary artery disease    • Hyperlipidemia    • Hypertension    • Myocardial infarction (CMS/[facility])        Social History     Socioeconomic History   • Marital status:      Spouse name: Not on file   • Number of children: Not on file   • Years of education: Not on file   • Highest education level: Not on file   Tobacco Use   • Smoking status: Current Every Day Smoker     Packs/day: 1.00     Years: 30.00     Pack years: 30.00     Types: Cigarettes   •  "Smokeless tobacco: Former User   Substance and Sexual Activity   • Alcohol use: No     Frequency: Never   • Drug use: No   • Sexual activity: Defer       Family History   Problem Relation Age of Onset   • Heart disease Mother    • Cancer Mother    • Hyperlipidemia Father    • Hypertension Father        Review of Systems   Constitutional: Positive for fatigue (tire easy, run out of steam). Negative for chills and fever.   HENT: Negative.  Negative for congestion.    Eyes: Positive for visual disturbance (readers).   Respiratory: Positive for chest tightness (if over exertes) and shortness of breath (with exertion).    Cardiovascular: Negative.  Negative for chest pain, palpitations and leg swelling.   Gastrointestinal: Negative.  Negative for abdominal pain, blood in stool, constipation, diarrhea, nausea and vomiting.   Endocrine: Negative.  Negative for cold intolerance and heat intolerance.   Genitourinary: Negative.  Negative for dysuria, frequency, hematuria and urgency.   Musculoskeletal: Negative.  Negative for back pain and neck pain.   Skin: Negative.  Negative for rash and wound.   Neurological: Positive for dizziness (if soa). Negative for syncope and light-headedness.   Hematological: Negative.  Does not bruise/bleed easily.   Psychiatric/Behavioral: Negative.  Negative for sleep disturbance (denies any recent events of soa or cp).       Objective     VITALS: /80 (BP Location: Left arm, Patient Position: Sitting)   Pulse 98   Ht 175.3 cm (69\")   Wt 90.7 kg (200 lb)   SpO2 99%   BMI 29.53 kg/m²     LABS:   Lab Results (most recent)     None          IMAGING:   No Images in the past 120 days found..    EXAM:  Physical Exam   Constitutional: He is oriented to person, place, and time. He appears well-developed and well-nourished.   HENT:   Head: Normocephalic and atraumatic.   Eyes: Pupils are equal, round, and reactive to light. EOM are normal.   Neck: Neck supple. No JVD present. Carotid bruit is " not present.   Cardiovascular: Normal rate, regular rhythm, normal heart sounds and intact distal pulses. Exam reveals no gallop.   No murmur heard.  Pulses:       Carotid pulses are 2+ on the right side, and 2+ on the left side.       Radial pulses are 2+ on the right side, and 2+ on the left side.        Posterior tibial pulses are 2+ on the right side, and 2+ on the left side.   Pulmonary/Chest: Effort normal and breath sounds normal. No respiratory distress.   Abdominal: Soft. Bowel sounds are normal. He exhibits no distension. There is no tenderness.   Musculoskeletal: Normal range of motion. He exhibits no edema.   Neurological: He is alert and oriented to person, place, and time. He has normal strength. No cranial nerve deficit or sensory deficit.   Skin: Skin is warm, dry and intact.   Psychiatric: He has a normal mood and affect. His speech is normal and behavior is normal. Judgment and thought content normal. Cognition and memory are normal.   Nursing note and vitals reviewed.      Procedure   Procedures       Assessment/Plan    Diagnosis Plan   1. Precordial pain  amLODIPine (NORVASC) 2.5 MG tablet   2. Chest wall pain  cyclobenzaprine (FLEXERIL) 10 MG tablet   1.  Patient still has some residual chest tightness and shortness of air with exertion however patient verbalized this is significantly male improved from previous.  Stated that the medications that was prescribed prior to heart cath also helped significantly and decreased his symptoms by at least 50%.  Patient will have Imdur continued at 60 mg daily along with Ranexa.  2.  Since chest wall pain is significantly improved with medic medical management along with stent placement by Dr. Aguirre.  Patient informed that he would require dual antiplatelet therapy for a year and to make sure to follow-up before stopping any medications.  3.  Patient verbalizes that the Flexeril helps significantly with the muscle spasms.  Will refill Flexeril at this  time and advised that he would need to follow-up with his family doctor for continuation of this medication.  4.  Patient informed of signs and symptoms of ACS and advised to seek emergent treatment for any new or worsening symptoms.  Patient also advised to seek sooner follow-up as needed.  5.  Patient's cath site has minimal ecchymosis no erythema or exudate.  Radial pulses palpable distal and proximal to the insertion site.  No numbness or tingling reported in the right hand.  Patient states that he is doing well.    Return in about 6 months (around 6/16/2020), or if symptoms worsen or fail to improve.    Navi was seen today for coronary artery disease and fatigue.    Diagnoses and all orders for this visit:    Precordial pain  -     amLODIPine (NORVASC) 2.5 MG tablet; Take 1 tablet by mouth Daily.    Chest wall pain  -     cyclobenzaprine (FLEXERIL) 10 MG tablet; Take 1 tablet by mouth 2 (Two) Times a Day As Needed for Muscle Spasms.        Navi Hanley is a current cigarettes user.  He currently smokes 1 pack of cigarettes and cigarettes per day for a duration of 30 years. I have educated him on the risk of diseases from using tobacco products such as cancer, COPD and heart diease.     I advised him to quit and he is willing to quit. We have discussed the following method/s for tobacco cessation:  Counseling.  Currently using Nicoderm patch.    I spent 3  minutes counseling the patient.          Patient's Body mass index is 29.53 kg/m². BMI is above normal parameters. Recommendations include: educational material.           MEDS ORDERED DURING VISIT:  New Medications Ordered This Visit   Medications   • cyclobenzaprine (FLEXERIL) 10 MG tablet     Sig: Take 1 tablet by mouth 2 (Two) Times a Day As Needed for Muscle Spasms.     Dispense:  60 tablet     Refill:  3   • amLODIPine (NORVASC) 2.5 MG tablet     Sig: Take 1 tablet by mouth Daily.     Dispense:  30 tablet     Refill:  11           This document has  been electronically signed by Anderson Palmer Jr., APRN  December 18, 2019 8:47 AM

## 2019-12-16 NOTE — PATIENT INSTRUCTIONS
Acute Coronary Syndrome    Acute coronary syndrome (ACS) is a serious problem in which there is suddenly not enough blood and oxygen reaching the heart. ACS can result in chest pain or a heart attack.  This condition is a medical emergency. If you have any symptoms of this condition, get help right away.  What are the causes?  This condition may be caused by:  · Buildup of fat and cholesterol inside of the arteries (atherosclerosis). This is the most common cause. The buildup (plaque) can cause blood vessels in the heart (coronary arteries) to become narrow or blocked, which reduces blood flow to the heart. Plaque can also break off and lead to a clot, which can block an artery and cause a heart attack or stroke.  · Sudden tightening of the muscles around the coronary arteries (coronary spasm).  · Tearing of a coronary artery (spontaneous coronary artery dissection).  · Very low blood pressure (hypotension).  · An abnormal heartbeat (arrhythmia).  · Other medical conditions that cause a decrease of oxygen to the heart, such as anemiaorrespiratory failure.  · Using cocaine or methamphetamine.  What increases the risk?  The following factors may make you more likely to develop this condition:  · Age. The risk for ACS increases as you get older.  · History of chest pain, heart attack, peripheral artery disease, or stroke.  · Having taken chemotherapy or immune-suppressing medicines.  · Being male.  · Family history of chest pain, heart disease, or stroke.  · Smoking.  · Not exercising enough.  · Being overweight.  · High cholesterol.  · High blood pressure (hypertension).  · Diabetes.  · Excessive alcohol use.  What are the signs or symptoms?  Common symptoms of this condition include:  · Chest pain. The pain may last a long time, or it may stop and come back (recur). It may feel like:  ? Crushing or squeezing.  ? Tightness, pressure, fullness, or heaviness.  · Arm, neck, jaw, or back pain.  · Heartburn or  indigestion.  · Shortness of breath.  · Nausea.  · Sudden cold sweats.  · Light-headedness.  · Dizziness, or passing out.  · Tiredness (fatigue).  Sometimes there are no symptoms.  How is this diagnosed?  This condition may be diagnosed based on:  · Your medical history and symptoms.  · An electrocardiogram (ECG). This imaging test measures the heart's electrical activity.  · Blood tests. Cardiac blood tests may need to be repeated at designated time intervals.  · Chest X-ray.  · A CT scan of the chest.  · A coronary angiogram. This is a procedure in which dye is injected into the bloodstream and then X-rays are taken to show if there is a blockage in a coronary artery.  · Exercise stress testing.  · Echocardiography. This is a test that uses sound waves to produce detailed images of the heart.  How is this treated?  The treatment is to restore blood flow to the heart as soon as possible. Treatment for this condition may include:  · Oxygen therapy.  · Medicines, such as:  ? Antiplatelet medicines and blood-thinning medicines, such as aspirin. These help prevent blood clots.  ? Medicine that dissolves any blood clots (fibrinolytic therapy).  ? Blood pressure medicines.  ? Nitroglycerin. This helps relieve chest pain and widens blood vessels to improve blood flow.  ? Pain medicine.  ? Cholesterol-lowering medicine.  · Surgery, such as:  ? Coronary angioplasty with stent placement. This involves placing a small piece of metal that looks like mesh or a spring into a narrow coronary artery. This widens the artery and keep it open.  ? Coronary artery bypass surgery. This involves taking a section of a blood vessel from a different part of your body, and placing it on the blocked coronary artery to allow blood to flow around (bypass) the blockage.  · Cardiac rehabilitation. This is a program that helps improve your health and well-being. It includes exercise training, education, and counseling to help you recover.  Follow  these instructions at home:  Eating and drinking  · Eat a heart-healthy diet that includes whole grains, fruits and vegetables, lean proteins, and low-fat or nonfat dairy products.  · Limit how much salt (sodium) you eat as told by your health care provider. Follow instructions from your health care provider about any other eating or drinking restrictions, such as limiting foods that are high in fat and processed sugars.  · Use healthy cooking methods such as roasting, grilling, broiling, baking, poaching, steaming, or stir-frying.  · Talk with a dietitian to learn about healthy cooking methods and how to eat less sodium.  Medicines  · Take over-the-counter and prescription medicines only as told by your health care provider.  · Do not take these medicines unless your health care provider approves:  ? Vitamin supplements that contain vitamin A or vitamin E.  ? Nonsteroidal anti-inflammatory drugs (NSAIDs), such as ibuprofen, naproxen, or celecoxib.  ? Hormone replacement therapy that contains estrogen.  If you are taking blood thinners:  · Talk with your health care provider before you take any medicines that contain aspirin or NSAIDs. These medicines increase your risk for dangerous bleeding.  · Take your medicine exactly as told, at the same time every day.  · Avoid activities that could cause injury or bruising, and follow instructions about how to prevent falls.  · Wear a medical alert bracelet, and carry a card that lists what medicines you take.  Activity  · Join a cardiac rehabilitation program. An exercise plan will be developed for you.  · Ask your health care provider:  ? What activities and exercises are safe for you.  ? If you should follow specific instructions about lifting, driving, or climbing stairs.  Lifestyle  · Do not use any products that contain nicotine or tobacco, such as cigarettes and e-cigarettes. If you need help quitting, ask your health care provider.  · If your health care provider  says that alcohol is safe for you, limit your alcohol intake to no more than 1 drink a day. One drink equals 12 oz of beer, 5 oz of wine, or 1½ oz of hard liquor.  · Maintain a healthy weight. If you need to lose weight, work with your health care provider to do so safely.  General instructions  · Tell all the health care providers who care for you about your heart condition, including your dentist. This may affect the medicines or treatment you receive.  · Manage any other health conditions you have, such as hypertension or diabetes. These conditions affect your heart.  · Learn ways to manage stress.  · Get screened for depression, and get mental health treatment if you need it. People with ACS are at higher risk for depression.  · Keep your vaccinations up to date. Get the flu shot (influenza vaccine) every year.  · If directed, monitor your blood pressure at home.  · Keep all follow-up visits as told by your health care provider. This is important.  Contact a health care provider if:  · You feel overwhelmed or sad.  · You have trouble doing your daily activities.  Get help right away if:  · You have pain in your chest, neck, arm, jaw, stomach, or back that recurs, and:  ? It lasts for more than a few minutes.  ? It is not relieved by taking the medicineyour health care provider prescribed.  · You have unexplained:  ? Heavy sweating.  ? Heartburn or indigestion.  ? Nausea or vomiting.  ? Shortness of breath.  ? Difficulty breathing.  ? Fatigue.  ? Nervousness or anxiety.  ? Weakness.  ? Diarrhea.  ? Dark stools or blood in your stool.  · You have sudden light-headedness or dizziness.  · Your blood pressure is higher than 180/120.  · You faint.  · You have thoughts about hurting yourself.  These symptoms may represent a serious problem that is an emergency. Do not wait to see if the symptoms will go away. Get medical help right away. Call your local emergency services (911 in the [place] Do not drive yourself to the  hospital.   If you ever feel like you may hurt yourself or others, or have thoughts about taking your own life, get help right away. You can go to your nearest emergency department or call:  · Emergency services (911 in the [place]  · A suicide crisis helpline, such as the National Suicide Prevention Lifeline at 1-693.821.8247. This is open 24 hours a day.  Summary  · Acute coronary syndrome (ACS) is when there is not enough blood and oxygen being supplied to the heart. ACS can result in chest pain or a heart attack.  · Acute coronary syndrome is a medical emergency. If you have any symptoms of this condition, get help right away.  · Treatment includes medicines and procedures to open the blocked arteries and restore blood flow.  This information is not intended to replace advice given to you by your health care provider. Make sure you discuss any questions you have with your health care provider.  Document Released: 12/18/2006 Document Revised: 08/28/2018 Document Reviewed: 08/28/2018  [employer] Interactive Patient Education © 2019 [employer]

## 2019-12-19 ENCOUNTER — TELEPHONE (OUTPATIENT)
Dept: CARDIOLOGY | Facility: CLINIC | Age: 44
End: 2019-12-19

## 2019-12-19 DIAGNOSIS — R07.2 PRECORDIAL PAIN: ICD-10-CM

## 2019-12-19 DIAGNOSIS — R07.89 CHEST WALL PAIN: ICD-10-CM

## 2019-12-19 RX ORDER — CYCLOBENZAPRINE HCL 10 MG
10 TABLET ORAL 2 TIMES DAILY PRN
Qty: 60 TABLET | Refills: 3 | Status: SHIPPED | OUTPATIENT
Start: 2019-12-19 | End: 2020-04-17

## 2019-12-19 RX ORDER — AMLODIPINE BESYLATE 2.5 MG/1
2.5 TABLET ORAL DAILY
Qty: 30 TABLET | Refills: 11 | Status: SHIPPED | OUTPATIENT
Start: 2019-12-19 | End: 2020-12-16

## 2019-12-19 NOTE — TELEPHONE ENCOUNTER
Spoke w/ [facility] staff and was informed that pt decided to switch px's recently and they need everything switched over. Also informed them that Flexeril is not something we typically fill and that PCP may need to address that in the future.     Sent Rf to [facility].

## 2019-12-19 NOTE — TELEPHONE ENCOUNTER
----- Message from Ana Ayon sent at 12/19/2019 12:08 PM EST -----  [facility] LVM requesting Rf of Amlodipine and Flexeril.     Per chart review, JR filled both on 12/16/19 to [facility].

## 2019-12-21 ENCOUNTER — RESULTS ENCOUNTER (OUTPATIENT)
Dept: CARDIOLOGY | Facility: CLINIC | Age: 44
End: 2019-12-21

## 2019-12-21 DIAGNOSIS — R07.89 CHEST WALL PAIN: ICD-10-CM

## 2019-12-21 DIAGNOSIS — I25.5 ISCHEMIC CARDIOMYOPATHY: ICD-10-CM

## 2019-12-21 DIAGNOSIS — I25.10 CORONARY ARTERY DISEASE INVOLVING NATIVE CORONARY ARTERY OF NATIVE HEART WITHOUT ANGINA PECTORIS: ICD-10-CM

## 2020-01-16 ENCOUNTER — TELEPHONE (OUTPATIENT)
Dept: CARDIOLOGY | Facility: CLINIC | Age: 45
End: 2020-01-16

## 2020-01-16 NOTE — TELEPHONE ENCOUNTER
Notified pharmacy per BREANNA MOULTON patient to take Naproxen short term only, no refills.  Pharmacist verbalized understanding. Tamika Abdul LPN          ----- Message from Ana Ayon sent at 1/16/2020  3:33 PM EST -----  Flor schwarz/ [facility] LVM stating pt is on Naproxen and Plavix and they wanted to make sure provider is okay w/ both.

## 2020-04-17 DIAGNOSIS — R07.89 CHEST WALL PAIN: ICD-10-CM

## 2020-04-17 RX ORDER — CYCLOBENZAPRINE HCL 10 MG
TABLET ORAL
Qty: 60 TABLET | Refills: 2 | Status: SHIPPED | OUTPATIENT
Start: 2020-04-17 | End: 2020-11-16

## 2020-06-16 ENCOUNTER — OFFICE VISIT (OUTPATIENT)
Dept: CARDIOLOGY | Facility: CLINIC | Age: 45
End: 2020-06-16

## 2020-06-16 VITALS
HEART RATE: 115 BPM | DIASTOLIC BLOOD PRESSURE: 93 MMHG | WEIGHT: 214 LBS | HEIGHT: 69 IN | SYSTOLIC BLOOD PRESSURE: 137 MMHG | BODY MASS INDEX: 31.7 KG/M2 | TEMPERATURE: 97.8 F | OXYGEN SATURATION: 98 %

## 2020-06-16 DIAGNOSIS — R07.2 PRECORDIAL PAIN: Primary | ICD-10-CM

## 2020-06-16 DIAGNOSIS — R06.02 SHORTNESS OF BREATH: ICD-10-CM

## 2020-06-16 DIAGNOSIS — I50.22 CHRONIC SYSTOLIC CONGESTIVE HEART FAILURE (HCC): ICD-10-CM

## 2020-06-16 PROCEDURE — 99214 OFFICE O/P EST MOD 30 MIN: CPT | Performed by: NURSE PRACTITIONER

## 2020-06-16 RX ORDER — RANOLAZINE 500 MG/1
500 TABLET, EXTENDED RELEASE ORAL 2 TIMES DAILY
Qty: 60 TABLET | Refills: 11 | Status: SHIPPED | OUTPATIENT
Start: 2020-06-16 | End: 2020-07-20 | Stop reason: SDUPTHER

## 2020-06-16 RX ORDER — NAPROXEN 500 MG/1
500 TABLET ORAL 2 TIMES DAILY WITH MEALS
Qty: 60 TABLET | Refills: 0 | Status: SHIPPED | OUTPATIENT
Start: 2020-06-16 | End: 2020-07-16

## 2020-06-16 RX ORDER — ISOSORBIDE MONONITRATE 120 MG/1
120 TABLET, EXTENDED RELEASE ORAL DAILY
Qty: 30 TABLET | Refills: 11 | Status: SHIPPED | OUTPATIENT
Start: 2020-06-16 | End: 2020-11-23 | Stop reason: SDUPTHER

## 2020-06-16 RX ORDER — CETIRIZINE HYDROCHLORIDE 10 MG/1
10 TABLET ORAL DAILY
COMMUNITY

## 2020-06-16 NOTE — PATIENT INSTRUCTIONS
Steps to Quit Smoking  Smoking tobacco is the leading cause of preventable death. It can affect almost every organ in the body. Smoking puts you and people around you at risk for many serious, long-lasting (chronic) diseases. Quitting smoking can be hard, but it is one of the best things that you can do for your health. It is never too late to quit.  How do I get ready to quit?  When you decide to quit smoking, make a plan to help you succeed. Before you quit:  · Pick a date to quit. Set a date within the next 2 weeks to give you time to prepare.  · Write down the reasons why you are quitting. Keep this list in places where you will see it often.  · Tell your family, friends, and co-workers that you are quitting. Their support is important.  · Talk with your doctor about the choices that may help you quit.  · Find out if your health insurance will pay for these treatments.  · Know the people, places, things, and activities that make you want to smoke (triggers). Avoid them.  What first steps can I take to quit smoking?  · Throw away all cigarettes at home, at work, and in your car.  · Throw away the things that you use when you smoke, such as ashtrays and lighters.  · Clean your car. Make sure to empty the ashtray.  · Clean your home, including curtains and carpets.  What can I do to help me quit smoking?  Talk with your doctor about taking medicines and seeing a counselor at the same time. You are more likely to succeed when you do both.  · If you are pregnant or breastfeeding, talk with your doctor about counseling or other ways to quit smoking. Do not take medicine to help you quit smoking unless your doctor tells you to do so.  To quit smoking:  Quit right away  · Quit smoking totally, instead of slowly cutting back on how much you smoke over a period of time.  · Go to counseling. You are more likely to quit if you go to counseling sessions regularly.  Take medicine  You may take medicines to help you quit. Some  medicines need a prescription, and some you can buy over-the-counter. Some medicines may contain a drug called nicotine to replace the nicotine in cigarettes. Medicines may:  · Help you to stop having the desire to smoke (cravings).  · Help to stop the problems that come when you stop smoking (withdrawal symptoms).  Your doctor may ask you to use:  · Nicotine patches, gum, or lozenges.  · Nicotine inhalers or sprays.  · Non-nicotine medicine that is taken by mouth.  Find resources  Find resources and other ways to help you quit smoking and remain smoke-free after you quit. These resources are most helpful when you use them often. They include:  · Online chats with a counselor.  · Phone quitlines.  · Printed self-help materials.  · Support groups or group counseling.  · Text messaging programs.  · Mobile phone apps. Use apps on your mobile phone or tablet that can help you stick to your quit plan. There are many free apps for mobile phones and tablets as well as websites. Examples include Quit Guide from the CDC and smokefree.gov    What things can I do to make it easier to quit?    · Talk to your family and friends. Ask them to support and encourage you.  · Call a phone quitline (7-063-QUITNOW), reach out to support groups, or work with a counselor.  · Ask people who smoke to not smoke around you.  · Avoid places that make you want to smoke, such as:  ? Bars.  ? Parties.  ? Smoke-break areas at work.  · Spend time with people who do not smoke.  · Lower the stress in your life. Stress can make you want to smoke. Try these things to help your stress:  ? Getting regular exercise.  ? Doing deep-breathing exercises.  ? Doing yoga.  ? Meditating.  ? Doing a body scan. To do this, close your eyes, focus on one area of your body at a time from head to toe. Notice which parts of your body are tense. Try to relax the muscles in those areas.  How will I feel when I quit smoking?  Day 1 to 3 weeks  Within the first 24 hours,  you may start to have some problems that come from quitting tobacco. These problems are very bad 2-3 days after you quit, but they do not often last for more than 2-3 weeks. You may get these symptoms:  · Mood swings.  · Feeling restless, nervous, angry, or annoyed.  · Trouble concentrating.  · Dizziness.  · Strong desire for high-sugar foods and nicotine.  · Weight gain.  · Trouble pooping (constipation).  · Feeling like you may vomit (nausea).  · Coughing or a sore throat.  · Changes in how the medicines that you take for other issues work in your body.  · Depression.  · Trouble sleeping (insomnia).  Week 3 and afterward  After the first 2-3 weeks of quitting, you may start to notice more positive results, such as:  · Better sense of smell and taste.  · Less coughing and sore throat.  · Slower heart rate.  · Lower blood pressure.  · Clearer skin.  · Better breathing.  · Fewer sick days.  Quitting smoking can be hard. Do not give up if you fail the first time. Some people need to try a few times before they succeed. Do your best to stick to your quit plan, and talk with your doctor if you have any questions or concerns.  Summary  · Smoking tobacco is the leading cause of preventable death. Quitting smoking can be hard, but it is one of the best things that you can do for your health.  · When you decide to quit smoking, make a plan to help you succeed.  · Quit smoking right away, not slowly over a period of time.  · When you start quitting, seek help from your doctor, family, or friends.  This information is not intended to replace advice given to you by your health care provider. Make sure you discuss any questions you have with your health care provider.  Document Released: 10/14/2010 Document Revised: 03/06/2020 Document Reviewed: 03/07/2020  Elsevier Patient Education © 2020 [employer]  BMI for Adults    Body mass index (BMI) is a number that is calculated from a person's weight and height. BMI may help to  "estimate how much of a person's weight is composed of fat. BMI can help identify those who may be at higher risk for certain medical problems.  How is BMI used with adults?  BMI is used as a screening tool to identify possible weight problems. It is used to check whether a person is obese, overweight, healthy weight, or underweight.  How is BMI calculated?  BMI measures your weight and compares it to your height. This can be done either in English ([place] or metric measurements. Note that charts are available to help you find your BMI quickly and easily without having to do these calculations yourself.  To calculate your BMI in English ([place] measurements, your health care provider will:  1. Measure your weight in pounds (lb).  2. Multiply the number of pounds by 703.  ? For example, for a person who weighs 180 lb, multiply that number by 703, which equals 126,540.  3. Measure your height in inches (in). Then multiply that number by itself to get a measurement called \"inches squared.\"  ? For example, for a person who is 70 in tall, the \"inches squared\" measurement is 70 in x 70 in, which equals 4900 inches squared.  4. Divide the total from Step 2 (number of lb x 703) by the total from Step 3 (inches squared): 126,540 ÷ 4900 = 25.8. This is your BMI.  To calculate your BMI in metric measurements, your health care provider will:  1. Measure your weight in kilograms (kg).  2. Measure your height in meters (m). Then multiply that number by itself to get a measurement called \"meters squared.\"  ? For example, for a person who is 1.75 m tall, the \"meters squared\" measurement is 1.75 m x 1.75 m, which is equal to 3.1 meters squared.  3. Divide the number of kilograms (your weight) by the meters squared number. In this example: 70 ÷ 3.1 = 22.6. This is your BMI.  How is BMI interpreted?  To interpret your results, your health care provider will use BMI charts to identify whether you are underweight, normal weight, " overweight, or obese. The following guidelines will be used:  · Underweight: BMI less than 18.5.  · Normal weight: BMI between 18.5 and 24.9.  · Overweight: BMI between 25 and 29.9.  · Obese: BMI of 30 and above.  Please note:  · Weight includes both fat and muscle, so someone with a muscular build, such as an athlete, may have a BMI that is higher than 24.9. In cases like these, BMI is not an accurate measure of body fat.  · To determine if excess body fat is the cause of a BMI of 25 or higher, further assessments may need to be done by a health care provider.  · BMI is usually interpreted in the same way for men and women.  Why is BMI a useful tool?  BMI is useful in two ways:  · Identifying a weight problem that may be related to a medical condition, or that may increase the risk for medical problems.  · Promoting lifestyle and diet changes in order to reach a healthy weight.  Summary  · Body mass index (BMI) is a number that is calculated from a person's weight and height.  · BMI may help to estimate how much of a person's weight is composed of fat. BMI can help identify those who may be at higher risk for certain medical problems.  · BMI can be measured using English measurements or metric measurements.  · To interpret your results, your health care provider will use BMI charts to identify whether you are underweight, normal weight, overweight, or obese.  This information is not intended to replace advice given to you by your health care provider. Make sure you discuss any questions you have with your health care provider.  Document Released: 08/29/2005 Document Revised: 11/30/2018 Document Reviewed: 10/31/2018  [employer] Patient Education © 2020 [employer]    Acute Coronary Syndrome  Acute coronary syndrome (ACS) is a serious problem in which there is suddenly not enough blood and oxygen reaching the heart. ACS can result in chest pain or a heart attack.  This condition is a medical emergency. If you have  any symptoms of this condition, get help right away.  What are the causes?  This condition may be caused by:  · A buildup of fat and cholesterol inside the arteries (atherosclerosis). This is the most common cause. The buildup (plaque) can cause blood vessels in the heart (coronary arteries) to become narrow or blocked, which reduces blood flow to the heart. Plaque can also break off and lead to a clot, which can block an artery and cause a heart attack or stroke.  · Sudden tightening of the muscles around the coronary arteries (coronary spasm).  · Tearing of a coronary artery (spontaneous coronary artery dissection).  · Very low blood pressure (hypotension).  · An abnormal heartbeat (arrhythmia).  · Other medical conditions that cause a decrease of oxygen to the heart, such as anemiaorrespiratory failure.  · Using cocaine or methamphetamine.  What increases the risk?  The following factors may make you more likely to develop this condition:  · Age. The risk for ACS increases as you get older.  · History of chest pain, heart attack, peripheral artery disease, or stroke.  · Having taken chemotherapy or immune-suppressing medicines.  · Being male.  · Family history of chest pain, heart disease, or stroke.  · Smoking.  · Not exercising enough.  · Being overweight.  · High cholesterol.  · High blood pressure (hypertension).  · Diabetes.  · Excessive alcohol use.  What are the signs or symptoms?  Common symptoms of this condition include:  · Chest pain. The pain may last a long time, or it may stop and come back (recur). It may feel like:  ? Crushing or squeezing.  ? Tightness, pressure, fullness, or heaviness.  · Arm, neck, jaw, or back pain.  · Heartburn or indigestion.  · Shortness of breath.  · Nausea.  · Sudden cold sweats.  · Light-headedness.  · Dizziness or passing out.  · Tiredness (fatigue).  Sometimes there are no symptoms.  How is this diagnosed?  This condition may be diagnosed based on:  · Your medical  history and symptoms.  · Imaging tests, such as:  ? An electrocardiogram (ECG). This measures the heart's electrical activity.  ? X-rays.  ? CT scan.  ? A coronary angiogram. For this test, dye is injected into the heart arteries and then X-rays are taken.  ? Myocardial perfusion imaging. This test shows how well blood flows through your heart muscle.  · Blood tests. These may be repeated at certain time intervals.  · Exercise stress testing.  · Echocardiogram. This is a test that uses sound waves to produce detailed images of the heart.  How is this treated?  Treatment for this condition may include:  · Oxygen therapy.  · Medicines, such as:  ? Antiplatelet medicines and blood-thinning medicines, such as aspirin. These help prevent blood clots.  ? Medicine that dissolves any blood clots (fibrinolytic therapy).  ? Blood pressure medicines.  ? Nitroglycerin. This helps widen blood vessels to improve blood flow.  ? Pain medicine.  ? Cholesterol-lowering medicine.  · Surgery, such as:  ? Coronary angioplasty with stent placement. This involves placing a small piece of metal that looks like mesh or a spring into a narrow coronary artery. This widens the artery and keeps it open.  ? Coronary artery bypass surgery. This involves taking a section of a blood vessel from a different part of your body and placing it on the blocked coronary artery to allow blood to flow around the blockage.  · Cardiac rehabilitation. This is a program that includes exercise training, education, and counseling to help you recover.  Follow these instructions at home:  Eating and drinking  · Eat a heart-healthy diet that includes whole grains, fruits and vegetables, lean proteins, and low-fat or nonfat dairy products.  · Limit how much salt (sodium) you eat as told by your health care provider. Follow instructions from your health care provider about any other eating or drinking restrictions, such as limiting foods that are high in fat and  processed sugars.  · Use healthy cooking methods such as roasting, grilling, broiling, baking, poaching, steaming, or stir-frying.  · Work with a dietitian to follow a heart-healthy eating plan.  Medicines  · Take over-the-counter and prescription medicines only as told by your health care provider.  · Do not take these medicines unless your health care provider approves:  ? Vitamin supplements that contain vitamin A or vitamin E.  ? NSAIDs, such as ibuprofen, naproxen, or celecoxib.  ? Hormone replacement therapy that contains estrogen.  · If you are taking blood thinners:  ? Talk with your health care provider before you take any medicines that contain aspirin or NSAIDs. These medicines increase your risk for dangerous bleeding.  ? Take your medicine exactly as told, at the same time every day.  ? Avoid activities that could cause injury or bruising, and follow instructions about how to prevent falls.  ? Wear a medical alert bracelet, and carry a card that lists what medicines you take.  Activity  · Follow your cardiac rehabilitation program. Do exercises as told by your physical therapist.  · Ask your health care provider what activities and exercises are safe for you. Follow his or her instructions about lifting, driving, or climbing stairs.  Lifestyle  · Do not use any products that contain nicotine or tobacco, such as cigarettes, e-cigarettes, and chewing tobacco. If you need help quitting, ask your health care provider.  · Do not drink alcohol if your health care provider tells you not to drink.  · If you drink alcohol:  ? Limit how much you have to 0-1 drink a day.  ? Be aware of how much alcohol is in your drink. In the [place] one drink equals one 12 oz bottle of beer (355 mL), one 5 oz glass of wine (148 mL), or one 1½ oz glass of hard liquor (44 mL).  · Maintain a healthy weight. If you need to lose weight, work with your health care provider to do so safely.  General instructions  · Tell all the health  care providers who provide care for you about your heart condition, including your dentist. This may affect the medicines or treatment you receive.  · Manage any other health conditions you have, such as hypertension or diabetes. These conditions affect your heart.  · Pay attention to your mental health. You may be at higher risk for depression.  ? Find ways to manage stress.  ? Talk to your health care provider about depression screening and treatment.  · Keep your vaccinations up to date.  ? Get the flu shot (influenza vaccine) every year.  ? Get the pneumococcal vaccine if you are age 65 or older.  · If directed, monitor your blood pressure at home.  · Keep all follow-up visits as told by your health care provider. This is important.  Contact a health care provider if you:  · Feel overwhelmed or sad.  · Have trouble doing your daily activities.  Get help right away if you:  · Have pain in your chest, neck, arm, jaw, stomach, or back that recurs, and:  ? It lasts for more than a few minutes.  ? It is not relieved by taking the medicineyour health care provider prescribed.  · Have unexplained:  ? Heavy sweating.  ? Heartburn or indigestion.  ? Nausea or vomiting.  ? Shortness of breath.  ? Difficulty breathing.  ? Fatigue.  ? Nervousness or anxiety.  ? Weakness.  ? Diarrhea.  ? Dark stools or blood in your stool.  · Have sudden light-headedness or dizziness.  · Have blood pressure that is higher than 180/120.  · Faint.  · Have thoughts about hurting yourself.  These symptoms may represent a serious problem that is an emergency. Do not wait to see if the symptoms will go away. Get medical help right away. Call your local emergency services (911 in the [place] Do not drive yourself to the hospital.   Summary  · Acute coronary syndrome (ACS) is when there is not enough blood and oxygen being supplied to the heart. ACS can result in chest pain or a heart attack.  · Acute coronary syndrome is a medical emergency. If you  have any symptoms of this condition, get help right away.  · Treatment includes medicines and procedures to open the blocked arteries and restore blood flow.  This information is not intended to replace advice given to you by your health care provider. Make sure you discuss any questions you have with your health care provider.  Document Released: 12/18/2006 Document Revised: 12/30/2019 Document Reviewed: 12/30/2019  [employer] Patient Education © 2020 [employer]

## 2020-06-16 NOTE — PROGRESS NOTES
Subjective     Navi Hanley is a 45 y.o. male who presents to day for Coronary Artery Disease.    CHIEF COMPLIANT  Chief Complaint   Patient presents with   • Coronary Artery Disease       Active Problems:  Problem List Items Addressed This Visit        Cardiovascular and Mediastinum    Chronic systolic congestive heart failure (CMS/HCC)    Relevant Medications    isosorbide mononitrate (IMDUR) 120 MG 24 hr tablet    ranolazine (Ranexa) 500 MG 12 hr tablet    Other Relevant Orders    Adult Transthoracic Echo Complete W/ Cont if Necessary Per Protocol    Stress Test With Myocardial Perfusion One Day       Respiratory    Shortness of breath    Relevant Medications    ranolazine (Ranexa) 500 MG 12 hr tablet    Other Relevant Orders    Adult Transthoracic Echo Complete W/ Cont if Necessary Per Protocol    Stress Test With Myocardial Perfusion One Day       Nervous and Auditory    Chest pain - Primary    Relevant Medications    naproxen (Naprosyn) 500 MG tablet    isosorbide mononitrate (IMDUR) 120 MG 24 hr tablet    ranolazine (Ranexa) 500 MG 12 hr tablet    Other Relevant Orders    Adult Transthoracic Echo Complete W/ Cont if Necessary Per Protocol    Stress Test With Myocardial Perfusion One Day      Problem list  1.  Coronary artery disease  1.1 cardiac catheterization April 2019 demonstrated high-grade three-vessel disease with an EF of 30% with recommendations for coronary artery bypass grafting  1.2 coronary artery bypass grafting, three-vessel, April 2019 per patient report, inadequate data  2.  Ischemic cardiomyopathy  2.1 EF estimated 30% by catheterization April 2019  3.  Dyslipidemia  4.  Chronic tobacco use  5. Heart catheretization  12- x 2 cardiac stents    HPI  HPI  Mr. Hanley is a 45-year-old male patient who is been followed up today for coronary artery disease with a reduced ejection fraction of 30% in April 2019 for left heart catheterization.  Patient reports that for 1 month after he had  the stent placement that he started developing chest pain again that was midsternal in nature that radiates to his left arm.  It is intermittent and last a couple hours at a time when it occurs.  He says it feels like a numbness type sensation in his chest and he has cold patches on his chest.  It is exacerbated with activity such as walking at the grocery store.  He says a hot shower sometimes helps him to relax and relieves the pain.  No other treatments were reports.  He denies any missed doses of his aspirin or Plavix.  Patient said he felt better for approximately 1 month after he stopped naproxen and which he felt was causing some of his symptoms.  But now it has returned.  Patient does report palpitations in which he describes as a flutter.  He does report that the palpitations are also exacerbated by activity.  He says that he is severely fatigued and that he falls asleep approximately 4 times in a single day.  He says that this is the same pain as he had before his open heart surgery, the same fatigue, the same shortness of breath.  He says at times it even feels like he has a knife down through his chest and is relieved sometimes with nitroglycerin.  He does have intermittent episodes of dizziness and lightheadedness as well.  He denies any lower extremity edema, syncope, PND, orthopnea, or other neurological changes.  PRIOR MEDS  Current Outpatient Medications on File Prior to Visit   Medication Sig Dispense Refill   • amLODIPine (NORVASC) 2.5 MG tablet Take 1 tablet by mouth Daily. 30 tablet 11   • ASPIRIN LOW DOSE 81 MG EC tablet Take 81 mg by mouth Daily.     • atorvastatin (LIPITOR) 80 MG tablet Take 80 mg by mouth Daily.     • cetirizine (zyrTEC) 10 MG tablet Take 10 mg by mouth Daily.     • clopidogrel (PLAVIX) 75 MG tablet Take 1 tablet by mouth Daily. 30 tablet 11   • cyclobenzaprine (FLEXERIL) 10 MG tablet TAKE 1 TABLET BY MOUTH TWO TIMES A DAY 60 tablet 2   • furosemide (LASIX) 20 MG tablet Take  20 mg by mouth Daily.     • MAPAP 325 MG tablet 325 mg As Needed.     • metoprolol succinate XL (TOPROL-XL) 25 MG 24 hr tablet Take 25 mg by mouth Daily.     • nitroglycerin (NITROSTAT) 0.4 MG SL tablet 1 under the tongue as needed for angina, may repeat q5mins for up three doses 100 tablet 11   • potassium chloride (K-DUR) 10 MEQ CR tablet 2 (Two) Times a Day.     • SENNA PLUS 8.6-50 MG per tablet 2 (Two) Times a Day.       No current facility-administered medications on file prior to visit.        ALLERGIES  Patient has no known allergies.    HISTORY  Past Medical History:   Diagnosis Date   • Coronary artery disease    • Hyperlipidemia    • Hypertension    • Myocardial infarction (CMS/HCC)        Social History     Socioeconomic History   • Marital status:      Spouse name: Not on file   • Number of children: Not on file   • Years of education: Not on file   • Highest education level: Not on file   Tobacco Use   • Smoking status: Current Every Day Smoker     Packs/day: 1.00     Years: 30.00     Pack years: 30.00     Types: Cigarettes   • Smokeless tobacco: Former User   Substance and Sexual Activity   • Alcohol use: No     Frequency: Never   • Drug use: No   • Sexual activity: Defer       Family History   Problem Relation Age of Onset   • Heart disease Mother    • Cancer Mother    • Hyperlipidemia Father    • Hypertension Father        Review of Systems   Constitutional: Positive for fatigue. Negative for chills and fever.   HENT: Positive for congestion. Negative for sinus pressure and sore throat.    Eyes: Positive for visual disturbance (readers).   Respiratory: Positive for chest tightness and shortness of breath (since stopping naproxen).    Cardiovascular: Positive for chest pain (states since stopping naproxen) and palpitations (if walking). Negative for leg swelling.   Gastrointestinal: Negative.  Negative for abdominal pain, blood in stool, constipation, diarrhea, nausea and vomiting.    "  Endocrine: Negative.  Negative for cold intolerance and heat intolerance.   Genitourinary: Negative.  Negative for difficulty urinating, frequency, hematuria and urgency.   Musculoskeletal: Negative.  Negative for arthralgias, back pain and neck pain.   Skin: Negative.  Negative for rash and wound.   Allergic/Immunologic: Negative.  Negative for environmental allergies and food allergies.   Neurological: Positive for dizziness and light-headedness. Negative for tremors and syncope.   Hematological: Bruises/bleeds easily.   Psychiatric/Behavioral: Negative for sleep disturbance (wakes with soa and cp).       Objective     VITALS: /93 (BP Location: Left arm, Patient Position: Sitting)   Pulse 115   Temp 97.8 °F (36.6 °C)   Ht 175.3 cm (69\")   Wt 97.1 kg (214 lb)   SpO2 98%   BMI 31.60 kg/m²     LABS:   Lab Results (most recent)     None          IMAGING:   No Images in the past 120 days found..    EXAM:  Physical Exam   Constitutional: He is oriented to person, place, and time. He appears well-developed and well-nourished.   HENT:   Head: Normocephalic and atraumatic.   Eyes: Pupils are equal, round, and reactive to light. EOM are normal.   Neck: Neck supple. No JVD present. Carotid bruit is not present.   Cardiovascular: Normal rate, regular rhythm, normal heart sounds and intact distal pulses. Exam reveals no gallop.   No murmur heard.  Pulses:       Carotid pulses are 2+ on the right side, and 2+ on the left side.       Radial pulses are 2+ on the right side, and 2+ on the left side.        Posterior tibial pulses are 2+ on the right side, and 2+ on the left side.   Pulmonary/Chest: Effort normal and breath sounds normal. No respiratory distress.   Abdominal: Soft. Bowel sounds are normal. He exhibits no distension. There is no tenderness.   Musculoskeletal: Normal range of motion. He exhibits edema (trace).   Neurological: He is alert and oriented to person, place, and time. He has normal strength. " No cranial nerve deficit or sensory deficit.   Skin: Skin is warm, dry and intact.   Psychiatric: He has a normal mood and affect. His speech is normal and behavior is normal. Judgment and thought content normal. Cognition and memory are normal.   Nursing note and vitals reviewed.      Procedure   Procedures       Assessment/Plan    Diagnosis Plan   1. Precordial pain  naproxen (Naprosyn) 500 MG tablet    Adult Transthoracic Echo Complete W/ Cont if Necessary Per Protocol    Stress Test With Myocardial Perfusion One Day    isosorbide mononitrate (IMDUR) 120 MG 24 hr tablet    ranolazine (Ranexa) 500 MG 12 hr tablet   2. Shortness of breath  Adult Transthoracic Echo Complete W/ Cont if Necessary Per Protocol    Stress Test With Myocardial Perfusion One Day    ranolazine (Ranexa) 500 MG 12 hr tablet   3. Chronic systolic congestive heart failure (CMS/HCC)  Adult Transthoracic Echo Complete W/ Cont if Necessary Per Protocol    Stress Test With Myocardial Perfusion One Day    ranolazine (Ranexa) 500 MG 12 hr tablet   1.  Due to patient's history of significant coronary artery disease including coronary artery bypass grafting shortness of breath and chest pain that are very similar to what it was prior to his coronary artery bypass grafting I do feel it is appropriate to repeat stress test and echocardiogram to evaluate patient for ischemia.  Patient informed of benefits and risk of the procedure and wishes to proceed.  2.  I will prescribe Ranexa 500 mg twice daily to help aid and pain management of his coronary artery disease and small vessel disease.  I will also increase isosorbide to 120 mg daily.  Patient advised to monitor his blood pressure report any significant highs or lows.  3.  Patient has prescribed sublingual nitroglycerin and advised he can take up to 3 doses 5 minutes apart and if pain is not relieved after the third dose go to the emergency department.  4.  Patient's blood pressure is elevated today at  137/93 with a heart rate of 115.  Hopefully with the addition of isosorbide 220 mg daily we will see a decrease in patient's blood pressure back toward goal.  5.  Informed of signs and symptoms of ACS and advised to seek emergent treatment for any new worsening symptoms.  Patient also advised sooner follow-up as needed.  Also advised to follow-up with family doctor as needed  This note is dictated utilizing voice recognition software.  Although this record has been proof read, transcriptional errors may still be present. If questions occur regarding the content of this record please do not hesitate to call our office.    Return in about 6 weeks (around 7/28/2020), or if symptoms worsen or fail to improve.    Navi was seen today for coronary artery disease.    Diagnoses and all orders for this visit:    Precordial pain  -     naproxen (Naprosyn) 500 MG tablet; Take 1 tablet by mouth 2 (Two) Times a Day With Meals.  -     Adult Transthoracic Echo Complete W/ Cont if Necessary Per Protocol; Future  -     Stress Test With Myocardial Perfusion One Day; Future  -     isosorbide mononitrate (IMDUR) 120 MG 24 hr tablet; Take 1 tablet by mouth Daily.  -     ranolazine (Ranexa) 500 MG 12 hr tablet; Take 1 tablet by mouth 2 (Two) Times a Day.    Shortness of breath  -     Adult Transthoracic Echo Complete W/ Cont if Necessary Per Protocol; Future  -     Stress Test With Myocardial Perfusion One Day; Future  -     ranolazine (Ranexa) 500 MG 12 hr tablet; Take 1 tablet by mouth 2 (Two) Times a Day.    Chronic systolic congestive heart failure (CMS/HCC)  -     Adult Transthoracic Echo Complete W/ Cont if Necessary Per Protocol; Future  -     Stress Test With Myocardial Perfusion One Day; Future  -     ranolazine (Ranexa) 500 MG 12 hr tablet; Take 1 tablet by mouth 2 (Two) Times a Day.        Navi Hanley  reports that he has been smoking cigarettes. He has a 30.00 pack-year smoking history. He has quit using smokeless  tobacco.. I have educated him on the risk of diseases from using tobacco products such as cancer, COPD and heart diease.     I advised him to quit and he is not willing to quit.    I spent 3  minutes counseling the patient.           Patient's Body mass index is 31.6 kg/m². BMI is above normal parameters. Recommendations include: educational material.           MEDS ORDERED DURING VISIT:  New Medications Ordered This Visit   Medications   • naproxen (Naprosyn) 500 MG tablet     Sig: Take 1 tablet by mouth 2 (Two) Times a Day With Meals.     Dispense:  60 tablet     Refill:  0   • isosorbide mononitrate (IMDUR) 120 MG 24 hr tablet     Sig: Take 1 tablet by mouth Daily.     Dispense:  30 tablet     Refill:  11   • ranolazine (Ranexa) 500 MG 12 hr tablet     Sig: Take 1 tablet by mouth 2 (Two) Times a Day.     Dispense:  60 tablet     Refill:  11           This document has been electronically signed by Anderson Palmer Jr., APRN  June 18, 2020 22:52

## 2020-06-17 ENCOUNTER — PRIOR AUTHORIZATION (OUTPATIENT)
Dept: CARDIOLOGY | Facility: CLINIC | Age: 45
End: 2020-06-17

## 2020-07-10 ENCOUNTER — HOSPITAL ENCOUNTER (OUTPATIENT)
Dept: CARDIOLOGY | Facility: HOSPITAL | Age: 45
Discharge: HOME | End: 2020-07-10

## 2020-07-10 DIAGNOSIS — I50.22 CHRONIC SYSTOLIC CONGESTIVE HEART FAILURE (HCC): ICD-10-CM

## 2020-07-10 DIAGNOSIS — R06.02 SHORTNESS OF BREATH: ICD-10-CM

## 2020-07-10 DIAGNOSIS — R07.2 PRECORDIAL PAIN: ICD-10-CM

## 2020-07-10 PROCEDURE — 0 TECHNETIUM SESTAMIBI: Performed by: INTERNAL MEDICINE

## 2020-07-10 PROCEDURE — 25010000002 REGADENOSON 0.4 MG/5ML SOLUTION: Performed by: INTERNAL MEDICINE

## 2020-07-10 PROCEDURE — 93306 TTE W/DOPPLER COMPLETE: CPT | Performed by: INTERNAL MEDICINE

## 2020-07-10 PROCEDURE — A9500 TC99M SESTAMIBI: HCPCS | Performed by: INTERNAL MEDICINE

## 2020-07-10 PROCEDURE — 93306 TTE W/DOPPLER COMPLETE: CPT

## 2020-07-10 PROCEDURE — 78452 HT MUSCLE IMAGE SPECT MULT: CPT

## 2020-07-10 PROCEDURE — 93018 CV STRESS TEST I&R ONLY: CPT | Performed by: INTERNAL MEDICINE

## 2020-07-10 PROCEDURE — 93016 CV STRESS TEST SUPVJ ONLY: CPT | Performed by: NURSE PRACTITIONER

## 2020-07-10 PROCEDURE — 93017 CV STRESS TEST TRACING ONLY: CPT

## 2020-07-10 PROCEDURE — 78452 HT MUSCLE IMAGE SPECT MULT: CPT | Performed by: INTERNAL MEDICINE

## 2020-07-10 RX ADMIN — TECHNETIUM TC 99M SESTAMIBI 1 DOSE: 1 INJECTION INTRAVENOUS at 08:32

## 2020-07-10 RX ADMIN — REGADENOSON 0.4 MG: 0.08 INJECTION, SOLUTION INTRAVENOUS at 08:34

## 2020-07-10 RX ADMIN — TECHNETIUM TC 99M SESTAMIBI 1 DOSE: 1 INJECTION INTRAVENOUS at 08:34

## 2020-07-12 LAB
BH CV ECHO MEAS - ACS: 2 CM
BH CV ECHO MEAS - AO MAX PG: 4.4 MMHG
BH CV ECHO MEAS - AO MEAN PG: 3 MMHG
BH CV ECHO MEAS - AO ROOT AREA (BSA CORRECTED): 1.5
BH CV ECHO MEAS - AO ROOT AREA: 7.8 CM^2
BH CV ECHO MEAS - AO ROOT DIAM: 3.2 CM
BH CV ECHO MEAS - AO V2 MAX: 105 CM/SEC
BH CV ECHO MEAS - AO V2 MEAN: 75.9 CM/SEC
BH CV ECHO MEAS - AO V2 VTI: 18.9 CM
BH CV ECHO MEAS - BSA(HAYCOCK): 2.2 M^2
BH CV ECHO MEAS - BSA: 2.1 M^2
BH CV ECHO MEAS - BZI_BMI: 31.6 KILOGRAMS/M^2
BH CV ECHO MEAS - BZI_METRIC_HEIGHT: 175.3 CM
BH CV ECHO MEAS - BZI_METRIC_WEIGHT: 97.1 KG
BH CV ECHO MEAS - EDV(CUBED): 92.3 ML
BH CV ECHO MEAS - EDV(MOD-SP4): 74.8 ML
BH CV ECHO MEAS - EDV(TEICH): 93.4 ML
BH CV ECHO MEAS - EF(CUBED): 43.4 %
BH CV ECHO MEAS - EF(MOD-SP4): 48.5 %
BH CV ECHO MEAS - EF(TEICH): 36.2 %
BH CV ECHO MEAS - ESV(CUBED): 52.3 ML
BH CV ECHO MEAS - ESV(MOD-SP4): 38.5 ML
BH CV ECHO MEAS - ESV(TEICH): 59.6 ML
BH CV ECHO MEAS - FS: 17.3 %
BH CV ECHO MEAS - IVS/LVPW: 1
BH CV ECHO MEAS - IVSD: 1.2 CM
BH CV ECHO MEAS - LA DIMENSION: 3.5 CM
BH CV ECHO MEAS - LA/AO: 1.1
BH CV ECHO MEAS - LV DIASTOLIC VOL/BSA (35-75): 35.2 ML/M^2
BH CV ECHO MEAS - LV IVRT: 0.1 SEC
BH CV ECHO MEAS - LV MASS(C)D: 207.9 GRAMS
BH CV ECHO MEAS - LV MASS(C)DI: 97.8 GRAMS/M^2
BH CV ECHO MEAS - LV SYSTOLIC VOL/BSA (12-30): 18.1 ML/M^2
BH CV ECHO MEAS - LVIDD: 4.5 CM
BH CV ECHO MEAS - LVIDS: 3.7 CM
BH CV ECHO MEAS - LVLD AP4: 8.2 CM
BH CV ECHO MEAS - LVLS AP4: 6.9 CM
BH CV ECHO MEAS - LVOT AREA (M): 4.5 CM^2
BH CV ECHO MEAS - LVOT AREA: 4.5 CM^2
BH CV ECHO MEAS - LVOT DIAM: 2.4 CM
BH CV ECHO MEAS - LVPWD: 1.2 CM
BH CV ECHO MEAS - MV A MAX VEL: 61.4 CM/SEC
BH CV ECHO MEAS - MV DEC SLOPE: 208 CM/SEC^2
BH CV ECHO MEAS - MV E MAX VEL: 50.3 CM/SEC
BH CV ECHO MEAS - MV E/A: 0.82
BH CV ECHO MEAS - RVDD: 2.4 CM
BH CV ECHO MEAS - SI(AO): 69.3 ML/M^2
BH CV ECHO MEAS - SI(CUBED): 18.8 ML/M^2
BH CV ECHO MEAS - SI(MOD-SP4): 17.1 ML/M^2
BH CV ECHO MEAS - SI(TEICH): 15.9 ML/M^2
BH CV ECHO MEAS - SV(AO): 147.3 ML
BH CV ECHO MEAS - SV(CUBED): 40 ML
BH CV ECHO MEAS - SV(MOD-SP4): 36.3 ML
BH CV ECHO MEAS - SV(TEICH): 33.8 ML
BH CV STRESS COMMENTS STAGE 1: NORMAL
BH CV STRESS DOSE REGADENOSON STAGE 1: 0.4
BH CV STRESS DURATION MIN STAGE 1: 0
BH CV STRESS DURATION SEC STAGE 1: 10
BH CV STRESS PROTOCOL 1: NORMAL
BH CV STRESS RECOVERY BP: NORMAL MMHG
BH CV STRESS RECOVERY HR: 97 BPM
BH CV STRESS STAGE 1: 1
MAXIMAL PREDICTED HEART RATE: 173 BPM
MAXIMAL PREDICTED HEART RATE: 173 BPM
PERCENT MAX PREDICTED HR: 64.16 %
STRESS BASELINE BP: NORMAL MMHG
STRESS BASELINE HR: 85 BPM
STRESS PERCENT HR: 75 %
STRESS POST PEAK BP: NORMAL MMHG
STRESS POST PEAK HR: 111 BPM
STRESS TARGET HR: 147 BPM
STRESS TARGET HR: 147 BPM

## 2020-07-16 ENCOUNTER — TELEPHONE (OUTPATIENT)
Dept: CARDIOLOGY | Facility: CLINIC | Age: 45
End: 2020-07-16

## 2020-07-16 DIAGNOSIS — R07.2 PRECORDIAL PAIN: ICD-10-CM

## 2020-07-16 RX ORDER — NAPROXEN 500 MG/1
TABLET ORAL
Qty: 60 TABLET | Refills: 0 | Status: SHIPPED | OUTPATIENT
Start: 2020-07-16 | End: 2020-11-16

## 2020-07-16 NOTE — TELEPHONE ENCOUNTER
Left message to call office. Patient needs notified he has appointment for Monday July 20th @ 10:00 am to discuss. Tamika Abdul LPN      ----- Message from SAIGE Lynn sent at 7/13/2020  8:13 AM EDT -----  Regarding: FW:  Positive stress and echo needs 1 to 2 week follow up  ----- Message -----  From: Navi Aguirre MD  Sent: 7/12/2020   9:51 PM EDT  To: SAIGE Lynn    Stress Test With Myocardial Perfusion One Day   Order: [id number]   Status:  Final result   Visible to patient:  No (Not Released) Dx:  Shortness of breath; Precordial pain;...   Details     Reading Physician Reading Date Result Priority   Navi Aguirre MD 7/10/2020 Routine   Navi Aguirre MD 7/10/2020    Heike Marino APRN 7/10/2020       Result Text     1.  Scintigraphy is compatible with a relatively large posterior lateral infarct with mild to moderate pacheco-infarct ischemia.     2.  Severely depressed post-rest ejection fraction of 27% with global hypokinesis and basilar inferolateral lateral akinesis     3.  No evidence of transient ischemic dilation or of increased lung uptake of radiopharmaceutical.           Lyn Goirdano Melissa, LPN             Monday 7/20 @ 10AM

## 2020-07-20 ENCOUNTER — OFFICE VISIT (OUTPATIENT)
Dept: CARDIOLOGY | Facility: CLINIC | Age: 45
End: 2020-07-20

## 2020-07-20 VITALS
TEMPERATURE: 97.1 F | WEIGHT: 214 LBS | DIASTOLIC BLOOD PRESSURE: 82 MMHG | BODY MASS INDEX: 31.7 KG/M2 | SYSTOLIC BLOOD PRESSURE: 125 MMHG | HEIGHT: 69 IN | OXYGEN SATURATION: 98 % | HEART RATE: 96 BPM

## 2020-07-20 DIAGNOSIS — I25.5 ISCHEMIC CARDIOMYOPATHY: Primary | ICD-10-CM

## 2020-07-20 DIAGNOSIS — R94.39 POSITIVE CARDIAC STRESS TEST: ICD-10-CM

## 2020-07-20 DIAGNOSIS — R06.02 SHORTNESS OF BREATH: ICD-10-CM

## 2020-07-20 DIAGNOSIS — R07.2 PRECORDIAL PAIN: ICD-10-CM

## 2020-07-20 DIAGNOSIS — I25.10 CORONARY ARTERY DISEASE INVOLVING NATIVE CORONARY ARTERY OF NATIVE HEART WITHOUT ANGINA PECTORIS: ICD-10-CM

## 2020-07-20 PROCEDURE — 99214 OFFICE O/P EST MOD 30 MIN: CPT | Performed by: NURSE PRACTITIONER

## 2020-07-20 RX ORDER — RANOLAZINE 1000 MG/1
1000 TABLET, EXTENDED RELEASE ORAL EVERY 12 HOURS SCHEDULED
Qty: 60 TABLET | Refills: 12 | Status: SHIPPED | OUTPATIENT
Start: 2020-07-20 | End: 2020-11-30 | Stop reason: SDUPTHER

## 2020-07-20 NOTE — PATIENT INSTRUCTIONS
Steps to Quit Smoking  Smoking tobacco is the leading cause of preventable death. It can affect almost every organ in the body. Smoking puts you and people around you at risk for many serious, long-lasting (chronic) diseases. Quitting smoking can be hard, but it is one of the best things that you can do for your health. It is never too late to quit.  How do I get ready to quit?  When you decide to quit smoking, make a plan to help you succeed. Before you quit:  · Pick a date to quit. Set a date within the next 2 weeks to give you time to prepare.  · Write down the reasons why you are quitting. Keep this list in places where you will see it often.  · Tell your family, friends, and co-workers that you are quitting. Their support is important.  · Talk with your doctor about the choices that may help you quit.  · Find out if your health insurance will pay for these treatments.  · Know the people, places, things, and activities that make you want to smoke (triggers). Avoid them.  What first steps can I take to quit smoking?  · Throw away all cigarettes at home, at work, and in your car.  · Throw away the things that you use when you smoke, such as ashtrays and lighters.  · Clean your car. Make sure to empty the ashtray.  · Clean your home, including curtains and carpets.  What can I do to help me quit smoking?  Talk with your doctor about taking medicines and seeing a counselor at the same time. You are more likely to succeed when you do both.  · If you are pregnant or breastfeeding, talk with your doctor about counseling or other ways to quit smoking. Do not take medicine to help you quit smoking unless your doctor tells you to do so.  To quit smoking:  Quit right away  · Quit smoking totally, instead of slowly cutting back on how much you smoke over a period of time.  · Go to counseling. You are more likely to quit if you go to counseling sessions regularly.  Take medicine  You may take medicines to help you quit. Some  medicines need a prescription, and some you can buy over-the-counter. Some medicines may contain a drug called nicotine to replace the nicotine in cigarettes. Medicines may:  · Help you to stop having the desire to smoke (cravings).  · Help to stop the problems that come when you stop smoking (withdrawal symptoms).  Your doctor may ask you to use:  · Nicotine patches, gum, or lozenges.  · Nicotine inhalers or sprays.  · Non-nicotine medicine that is taken by mouth.  Find resources  Find resources and other ways to help you quit smoking and remain smoke-free after you quit. These resources are most helpful when you use them often. They include:  · Online chats with a counselor.  · Phone quitlines.  · Printed self-help materials.  · Support groups or group counseling.  · Text messaging programs.  · Mobile phone apps. Use apps on your mobile phone or tablet that can help you stick to your quit plan. There are many free apps for mobile phones and tablets as well as websites. Examples include Quit Guide from the CDC and smokefree.gov    What things can I do to make it easier to quit?    · Talk to your family and friends. Ask them to support and encourage you.  · Call a phone quitline (2-626-QUITNOW), reach out to support groups, or work with a counselor.  · Ask people who smoke to not smoke around you.  · Avoid places that make you want to smoke, such as:  ? Bars.  ? Parties.  ? Smoke-break areas at work.  · Spend time with people who do not smoke.  · Lower the stress in your life. Stress can make you want to smoke. Try these things to help your stress:  ? Getting regular exercise.  ? Doing deep-breathing exercises.  ? Doing yoga.  ? Meditating.  ? Doing a body scan. To do this, close your eyes, focus on one area of your body at a time from head to toe. Notice which parts of your body are tense. Try to relax the muscles in those areas.  How will I feel when I quit smoking?  Day 1 to 3 weeks  Within the first 24 hours,  you may start to have some problems that come from quitting tobacco. These problems are very bad 2-3 days after you quit, but they do not often last for more than 2-3 weeks. You may get these symptoms:  · Mood swings.  · Feeling restless, nervous, angry, or annoyed.  · Trouble concentrating.  · Dizziness.  · Strong desire for high-sugar foods and nicotine.  · Weight gain.  · Trouble pooping (constipation).  · Feeling like you may vomit (nausea).  · Coughing or a sore throat.  · Changes in how the medicines that you take for other issues work in your body.  · Depression.  · Trouble sleeping (insomnia).  Week 3 and afterward  After the first 2-3 weeks of quitting, you may start to notice more positive results, such as:  · Better sense of smell and taste.  · Less coughing and sore throat.  · Slower heart rate.  · Lower blood pressure.  · Clearer skin.  · Better breathing.  · Fewer sick days.  Quitting smoking can be hard. Do not give up if you fail the first time. Some people need to try a few times before they succeed. Do your best to stick to your quit plan, and talk with your doctor if you have any questions or concerns.  Summary  · Smoking tobacco is the leading cause of preventable death. Quitting smoking can be hard, but it is one of the best things that you can do for your health.  · When you decide to quit smoking, make a plan to help you succeed.  · Quit smoking right away, not slowly over a period of time.  · When you start quitting, seek help from your doctor, family, or friends.  This information is not intended to replace advice given to you by your health care provider. Make sure you discuss any questions you have with your health care provider.  Document Released: 10/14/2010 Document Revised: 03/06/2020 Document Reviewed: 03/07/2020  Elsevier Patient Education © 2020 [employer]  BMI for Adults    Body mass index (BMI) is a number that is calculated from a person's weight and height. BMI may help to  "estimate how much of a person's weight is composed of fat. BMI can help identify those who may be at higher risk for certain medical problems.  How is BMI used with adults?  BMI is used as a screening tool to identify possible weight problems. It is used to check whether a person is obese, overweight, healthy weight, or underweight.  How is BMI calculated?  BMI measures your weight and compares it to your height. This can be done either in English ([place] or metric measurements. Note that charts are available to help you find your BMI quickly and easily without having to do these calculations yourself.  To calculate your BMI in English ([place] measurements, your health care provider will:  1. Measure your weight in pounds (lb).  2. Multiply the number of pounds by 703.  ? For example, for a person who weighs 180 lb, multiply that number by 703, which equals 126,540.  3. Measure your height in inches (in). Then multiply that number by itself to get a measurement called \"inches squared.\"  ? For example, for a person who is 70 in tall, the \"inches squared\" measurement is 70 in x 70 in, which equals 4900 inches squared.  4. Divide the total from Step 2 (number of lb x 703) by the total from Step 3 (inches squared): 126,540 ÷ 4900 = 25.8. This is your BMI.  To calculate your BMI in metric measurements, your health care provider will:  1. Measure your weight in kilograms (kg).  2. Measure your height in meters (m). Then multiply that number by itself to get a measurement called \"meters squared.\"  ? For example, for a person who is 1.75 m tall, the \"meters squared\" measurement is 1.75 m x 1.75 m, which is equal to 3.1 meters squared.  3. Divide the number of kilograms (your weight) by the meters squared number. In this example: 70 ÷ 3.1 = 22.6. This is your BMI.  How is BMI interpreted?  To interpret your results, your health care provider will use BMI charts to identify whether you are underweight, normal weight, " overweight, or obese. The following guidelines will be used:  · Underweight: BMI less than 18.5.  · Normal weight: BMI between 18.5 and 24.9.  · Overweight: BMI between 25 and 29.9.  · Obese: BMI of 30 and above.  Please note:  · Weight includes both fat and muscle, so someone with a muscular build, such as an athlete, may have a BMI that is higher than 24.9. In cases like these, BMI is not an accurate measure of body fat.  · To determine if excess body fat is the cause of a BMI of 25 or higher, further assessments may need to be done by a health care provider.  · BMI is usually interpreted in the same way for men and women.  Why is BMI a useful tool?  BMI is useful in two ways:  · Identifying a weight problem that may be related to a medical condition, or that may increase the risk for medical problems.  · Promoting lifestyle and diet changes in order to reach a healthy weight.  Summary  · Body mass index (BMI) is a number that is calculated from a person's weight and height.  · BMI may help to estimate how much of a person's weight is composed of fat. BMI can help identify those who may be at higher risk for certain medical problems.  · BMI can be measured using English measurements or metric measurements.  · To interpret your results, your health care provider will use BMI charts to identify whether you are underweight, normal weight, overweight, or obese.  This information is not intended to replace advice given to you by your health care provider. Make sure you discuss any questions you have with your health care provider.  Document Released: 08/29/2005 Document Revised: 11/30/2018 Document Reviewed: 10/31/2018  [employer] Patient Education © 2020 [employer]    Acute Coronary Syndrome  Acute coronary syndrome (ACS) is a serious problem in which there is suddenly not enough blood and oxygen reaching the heart. ACS can result in chest pain or a heart attack.  This condition is a medical emergency. If you have  any symptoms of this condition, get help right away.  What are the causes?  This condition may be caused by:  · A buildup of fat and cholesterol inside the arteries (atherosclerosis). This is the most common cause. The buildup (plaque) can cause blood vessels in the heart (coronary arteries) to become narrow or blocked, which reduces blood flow to the heart. Plaque can also break off and lead to a clot, which can block an artery and cause a heart attack or stroke.  · Sudden tightening of the muscles around the coronary arteries (coronary spasm).  · Tearing of a coronary artery (spontaneous coronary artery dissection).  · Very low blood pressure (hypotension).  · An abnormal heartbeat (arrhythmia).  · Other medical conditions that cause a decrease of oxygen to the heart, such as anemiaorrespiratory failure.  · Using cocaine or methamphetamine.  What increases the risk?  The following factors may make you more likely to develop this condition:  · Age. The risk for ACS increases as you get older.  · History of chest pain, heart attack, peripheral artery disease, or stroke.  · Having taken chemotherapy or immune-suppressing medicines.  · Being male.  · Family history of chest pain, heart disease, or stroke.  · Smoking.  · Not exercising enough.  · Being overweight.  · High cholesterol.  · High blood pressure (hypertension).  · Diabetes.  · Excessive alcohol use.  What are the signs or symptoms?  Common symptoms of this condition include:  · Chest pain. The pain may last a long time, or it may stop and come back (recur). It may feel like:  ? Crushing or squeezing.  ? Tightness, pressure, fullness, or heaviness.  · Arm, neck, jaw, or back pain.  · Heartburn or indigestion.  · Shortness of breath.  · Nausea.  · Sudden cold sweats.  · Light-headedness.  · Dizziness or passing out.  · Tiredness (fatigue).  Sometimes there are no symptoms.  How is this diagnosed?  This condition may be diagnosed based on:  · Your medical  history and symptoms.  · Imaging tests, such as:  ? An electrocardiogram (ECG). This measures the heart's electrical activity.  ? X-rays.  ? CT scan.  ? A coronary angiogram. For this test, dye is injected into the heart arteries and then X-rays are taken.  ? Myocardial perfusion imaging. This test shows how well blood flows through your heart muscle.  · Blood tests. These may be repeated at certain time intervals.  · Exercise stress testing.  · Echocardiogram. This is a test that uses sound waves to produce detailed images of the heart.  How is this treated?  Treatment for this condition may include:  · Oxygen therapy.  · Medicines, such as:  ? Antiplatelet medicines and blood-thinning medicines, such as aspirin. These help prevent blood clots.  ? Medicine that dissolves any blood clots (fibrinolytic therapy).  ? Blood pressure medicines.  ? Nitroglycerin. This helps widen blood vessels to improve blood flow.  ? Pain medicine.  ? Cholesterol-lowering medicine.  · Surgery, such as:  ? Coronary angioplasty with stent placement. This involves placing a small piece of metal that looks like mesh or a spring into a narrow coronary artery. This widens the artery and keeps it open.  ? Coronary artery bypass surgery. This involves taking a section of a blood vessel from a different part of your body and placing it on the blocked coronary artery to allow blood to flow around the blockage.  · Cardiac rehabilitation. This is a program that includes exercise training, education, and counseling to help you recover.  Follow these instructions at home:  Eating and drinking  · Eat a heart-healthy diet that includes whole grains, fruits and vegetables, lean proteins, and low-fat or nonfat dairy products.  · Limit how much salt (sodium) you eat as told by your health care provider. Follow instructions from your health care provider about any other eating or drinking restrictions, such as limiting foods that are high in fat and  processed sugars.  · Use healthy cooking methods such as roasting, grilling, broiling, baking, poaching, steaming, or stir-frying.  · Work with a dietitian to follow a heart-healthy eating plan.  Medicines  · Take over-the-counter and prescription medicines only as told by your health care provider.  · Do not take these medicines unless your health care provider approves:  ? Vitamin supplements that contain vitamin A or vitamin E.  ? NSAIDs, such as ibuprofen, naproxen, or celecoxib.  ? Hormone replacement therapy that contains estrogen.  · If you are taking blood thinners:  ? Talk with your health care provider before you take any medicines that contain aspirin or NSAIDs. These medicines increase your risk for dangerous bleeding.  ? Take your medicine exactly as told, at the same time every day.  ? Avoid activities that could cause injury or bruising, and follow instructions about how to prevent falls.  ? Wear a medical alert bracelet, and carry a card that lists what medicines you take.  Activity  · Follow your cardiac rehabilitation program. Do exercises as told by your physical therapist.  · Ask your health care provider what activities and exercises are safe for you. Follow his or her instructions about lifting, driving, or climbing stairs.  Lifestyle  · Do not use any products that contain nicotine or tobacco, such as cigarettes, e-cigarettes, and chewing tobacco. If you need help quitting, ask your health care provider.  · Do not drink alcohol if your health care provider tells you not to drink.  · If you drink alcohol:  ? Limit how much you have to 0-1 drink a day.  ? Be aware of how much alcohol is in your drink. In the [place] one drink equals one 12 oz bottle of beer (355 mL), one 5 oz glass of wine (148 mL), or one 1½ oz glass of hard liquor (44 mL).  · Maintain a healthy weight. If you need to lose weight, work with your health care provider to do so safely.  General instructions  · Tell all the health  care providers who provide care for you about your heart condition, including your dentist. This may affect the medicines or treatment you receive.  · Manage any other health conditions you have, such as hypertension or diabetes. These conditions affect your heart.  · Pay attention to your mental health. You may be at higher risk for depression.  ? Find ways to manage stress.  ? Talk to your health care provider about depression screening and treatment.  · Keep your vaccinations up to date.  ? Get the flu shot (influenza vaccine) every year.  ? Get the pneumococcal vaccine if you are age 65 or older.  · If directed, monitor your blood pressure at home.  · Keep all follow-up visits as told by your health care provider. This is important.  Contact a health care provider if you:  · Feel overwhelmed or sad.  · Have trouble doing your daily activities.  Get help right away if you:  · Have pain in your chest, neck, arm, jaw, stomach, or back that recurs, and:  ? It lasts for more than a few minutes.  ? It is not relieved by taking the medicineyour health care provider prescribed.  · Have unexplained:  ? Heavy sweating.  ? Heartburn or indigestion.  ? Nausea or vomiting.  ? Shortness of breath.  ? Difficulty breathing.  ? Fatigue.  ? Nervousness or anxiety.  ? Weakness.  ? Diarrhea.  ? Dark stools or blood in your stool.  · Have sudden light-headedness or dizziness.  · Have blood pressure that is higher than 180/120.  · Faint.  · Have thoughts about hurting yourself.  These symptoms may represent a serious problem that is an emergency. Do not wait to see if the symptoms will go away. Get medical help right away. Call your local emergency services (911 in the [place] Do not drive yourself to the hospital.   Summary  · Acute coronary syndrome (ACS) is when there is not enough blood and oxygen being supplied to the heart. ACS can result in chest pain or a heart attack.  · Acute coronary syndrome is a medical emergency. If you  have any symptoms of this condition, get help right away.  · Treatment includes medicines and procedures to open the blocked arteries and restore blood flow.  This information is not intended to replace advice given to you by your health care provider. Make sure you discuss any questions you have with your health care provider.  Document Released: 12/18/2006 Document Revised: 12/30/2019 Document Reviewed: 12/30/2019  [employer] Patient Education © 2020 [employer]

## 2020-07-20 NOTE — PROGRESS NOTES
Subjective     Navi Hanley is a 45 y.o. male who presents to day for Coronary Artery Disease (stress and ech 7-10-20).    CHIEF COMPLIANT  Chief Complaint   Patient presents with   • Coronary Artery Disease     stress and ech 7-10-20       Active Problems:  Problem List Items Addressed This Visit        Cardiovascular and Mediastinum    Ischemic cardiomyopathy - Primary    Relevant Medications    ranolazine (Ranexa) 1000 MG 12 hr tablet    Other Relevant Orders    [facility]    Basic Metabolic Panel    CBC & Differential    P2Y12 Platelet Inhibition    Coronary artery disease involving native coronary artery of native heart without angina pectoris    Relevant Medications    ranolazine (Ranexa) 1000 MG 12 hr tablet    Other Relevant Orders    P2Y12 Platelet Inhibition       Respiratory    Shortness of breath    Relevant Medications    ranolazine (Ranexa) 1000 MG 12 hr tablet    Other Relevant Orders    [facility]    Basic Metabolic Panel    CBC & Differential    P2Y12 Platelet Inhibition       Nervous and Auditory    Chest pain    Relevant Medications    ranolazine (Ranexa) 1000 MG 12 hr tablet    Other Relevant Orders    [facility]    Basic Metabolic Panel    CBC & Differential    P2Y12 Platelet Inhibition      Other Visit Diagnoses     Positive cardiac stress test        Relevant Medications    ranolazine (Ranexa) 1000 MG 12 hr tablet    Other Relevant Orders    [facility]    Basic Metabolic Panel    CBC & Differential    P2Y12 Platelet Inhibition      Problem list  1.  Coronary artery disease  1.1 cardiac catheterization April 2019 demonstrated high-grade three-vessel disease with an EF of 30% with recommendations for coronary artery bypass grafting  1.2 coronary artery bypass grafting, three-vessel, April 2019 per patient report, inadequate data  1.3.  Stress test 7/20:  2.  Ischemic cardiomyopathy posterior lateral infarct with mild to moderate pacheco-infarct ischemia  post-rest ejection fraction of 27%  2.1 EF estimated 30% by catheterization April 2019  2.2 echocardiogram 7/20: Mild dilation of left ventricle, mildly to moderately reduced ejection fraction of 40 to 45%, borderline concentric left ventricular hypertrophy grade 1 diastolic dysfunction mild left atrial enlargement  3.  Dyslipidemia  4.  Chronic tobacco use  5. Heart catheretization  12- x 2 cardiac stents    HPI  HPI  Mr. Hanley is a 45-year-old male patient who is being seen today for coronary artery disease and stress test and echo results.  Patient continues to have persistent left chest pain that has been occurring for approximately 1 month after his previous stenting.  He says it is intermittent in nature and last a couple days at a time and sometimes only last a few hours.  He feels like he has a cold spot in his chest.  He has associated numbness in his fingers.  It is worse with activity and is stimulated with only minimal activity.  He says rest and nitroglycerin are relieving factors.  He says that anti-inflammatories also help some.  He says he has a chronic tightness in his chest.  He says he chronically gets charley horses and cramps in his rib cage however that has seemed to improve.  Patient also reports dizziness if he changes positions too quickly.  Patient reports palpitations when he is active and his heart races.  Patient is always fatigue and becomes even tired or if he tries to do any activity at all.  He has a decreased exercise compliance.  The symptoms are very similar to what he had when he is had his open heart surgery.  He says his shortness of breath is identical to as it was then.  Patient denies any lower extremity edema, syncope, orthopnea, or other neurological changes.  PRIOR MEDS  Current Outpatient Medications on File Prior to Visit   Medication Sig Dispense Refill   • amLODIPine (NORVASC) 2.5 MG tablet Take 1 tablet by mouth Daily. 30 tablet 11   • ASPIRIN LOW DOSE 81  MG EC tablet Take 81 mg by mouth Daily.     • atorvastatin (LIPITOR) 80 MG tablet Take 80 mg by mouth Daily.     • cetirizine (zyrTEC) 10 MG tablet Take 10 mg by mouth Daily.     • clopidogrel (PLAVIX) 75 MG tablet Take 1 tablet by mouth Daily. 30 tablet 11   • cyclobenzaprine (FLEXERIL) 10 MG tablet TAKE 1 TABLET BY MOUTH TWO TIMES A DAY 60 tablet 2   • furosemide (LASIX) 20 MG tablet Take 20 mg by mouth Daily.     • isosorbide mononitrate (IMDUR) 120 MG 24 hr tablet Take 1 tablet by mouth Daily. 30 tablet 11   • MAPAP 325 MG tablet 325 mg As Needed.     • metoprolol succinate XL (TOPROL-XL) 25 MG 24 hr tablet Take 25 mg by mouth Daily.     • naproxen (NAPROSYN) 500 MG tablet TAKE 1 TABLET BY MOUTH TWO TIMES A DAY WITH MEALS 60 tablet 0   • nitroglycerin (NITROSTAT) 0.4 MG SL tablet 1 under the tongue as needed for angina, may repeat q5mins for up three doses 100 tablet 11   • potassium chloride (K-DUR) 10 MEQ CR tablet 2 (Two) Times a Day.     • SENNA PLUS 8.6-50 MG per tablet 2 (Two) Times a Day.       No current facility-administered medications on file prior to visit.        ALLERGIES  Patient has no known allergies.    HISTORY  Past Medical History:   Diagnosis Date   • Coronary artery disease    • Hyperlipidemia    • Hypertension    • Myocardial infarction (CMS/HCC)        Social History     Socioeconomic History   • Marital status:      Spouse name: Not on file   • Number of children: Not on file   • Years of education: Not on file   • Highest education level: Not on file   Tobacco Use   • Smoking status: Current Every Day Smoker     Packs/day: 1.00     Years: 30.00     Pack years: 30.00     Types: Cigarettes   • Smokeless tobacco: Former User   Substance and Sexual Activity   • Alcohol use: No     Frequency: Never   • Drug use: No   • Sexual activity: Defer       Family History   Problem Relation Age of Onset   • Heart disease Mother    • Cancer Mother    • Hyperlipidemia Father    • Hypertension  "Father        Review of Systems   Constitutional: Positive for fatigue (tired when he trys to do anything , immediately tired). Negative for chills and fever.   HENT: Negative.  Negative for congestion, sinus pressure and sore throat.    Eyes: Positive for visual disturbance (readers).   Respiratory: Positive for chest tightness (reports always has tightness) and shortness of breath.    Cardiovascular: Positive for chest pain (at times) and palpitations (races with activity). Negative for leg swelling.   Gastrointestinal: Negative.  Negative for abdominal pain, blood in stool, diarrhea, nausea and vomiting.   Endocrine: Negative.  Negative for cold intolerance and heat intolerance.   Genitourinary: Negative.  Negative for dysuria, frequency, hematuria and urgency.   Musculoskeletal: Negative.  Negative for arthralgias, back pain and neck pain.   Skin: Negative.  Negative for rash and wound.   Allergic/Immunologic: Negative.  Negative for environmental allergies and food allergies.   Neurological: Positive for dizziness (with position change). Negative for syncope and light-headedness.   Hematological: Bruises/bleeds easily.   Psychiatric/Behavioral: Positive for sleep disturbance (reports waking gasping for air at times, denies cp).       Objective     VITALS: /82 (BP Location: Left arm, Patient Position: Sitting)   Pulse 96   Temp 97.1 °F (36.2 °C)   Ht 175.3 cm (69\")   Wt 97.1 kg (214 lb)   SpO2 98%   BMI 31.60 kg/m²     LABS:   Lab Results (most recent)     None          IMAGING:   No Images in the past 120 days found..    EXAM:  Physical Exam   Constitutional: He is oriented to person, place, and time. He appears well-developed and well-nourished.   HENT:   Head: Normocephalic and atraumatic.   Eyes: Pupils are equal, round, and reactive to light. EOM are normal.   Neck: Neck supple. No JVD present. Carotid bruit is not present.   Cardiovascular: Normal rate, regular rhythm, normal heart sounds and " intact distal pulses. Exam reveals no gallop.   No murmur heard.  Pulses:       Carotid pulses are 2+ on the right side, and 2+ on the left side.       Radial pulses are 2+ on the right side, and 2+ on the left side.        Posterior tibial pulses are 2+ on the right side, and 2+ on the left side.   Pulmonary/Chest: Effort normal and breath sounds normal. No respiratory distress.   Abdominal: Soft. Bowel sounds are normal. He exhibits no distension. There is no tenderness.   Musculoskeletal: Normal range of motion. He exhibits edema.   Neurological: He is alert and oriented to person, place, and time. He has normal strength. No cranial nerve deficit or sensory deficit.   Skin: Skin is warm, dry and intact.   Psychiatric: He has a normal mood and affect. His speech is normal and behavior is normal. Judgment and thought content normal. Cognition and memory are normal.   Nursing note and vitals reviewed.      Procedure   Procedures       Assessment/Plan    Diagnosis Plan   1. Ischemic cardiomyopathy  [facility]    Basic Metabolic Panel    CBC & Differential    ranolazine (Ranexa) 1000 MG 12 hr tablet    P2Y12 Platelet Inhibition   2. Precordial pain  [facility]    Basic Metabolic Panel    CBC & Differential    ranolazine (Ranexa) 1000 MG 12 hr tablet    P2Y12 Platelet Inhibition   3. Shortness of breath  [facility]    Basic Metabolic Panel    CBC & Differential    ranolazine (Ranexa) 1000 MG 12 hr tablet    P2Y12 Platelet Inhibition   4. Positive cardiac stress test  [facility]    Basic Metabolic Panel    CBC & Differential    ranolazine (Ranexa) 1000 MG 12 hr tablet    P2Y12 Platelet Inhibition   5. Coronary artery disease involving native coronary artery of native heart without angina pectoris  ranolazine (Ranexa) 1000 MG 12 hr tablet    P2Y12 Platelet Inhibition   1.  Patient does have significant history of coronary artery disease with bypass grafting.  He does also have  ischemic cardiomyopathy, persistent chest pain since previous stent, shortness of breath, and a positive cardiac stress test and due to this I think it is important that we move forth with further ischemic work-up including left heart catheterization.  2.  Since patient is having issues status post stenting while on Plavix I do think it is important to do a P2 Y 12 platelet assay to evaluate patient's responsiveness to Plavix.  3.  Preprocedure labs will be ordered and patient advised to have them drawn approximately 1 week prior to left heart catheterization.  4.  We will increase patient's antianginal therapy with Ranexa thousand milligrams twice daily.  5.  Informed of signs and symptoms of ACS and advised to seek emergent treatment for any new worsening symptoms.  Patient also advised sooner follow-up as needed.  Also advised to follow-up with family doctor as needed  This note is dictated utilizing voice recognition software.  Although this record has been proof read, transcriptional errors may still be present. If questions occur regarding the content of this record please do not hesitate to call our office.    Return if symptoms worsen or fail to improve, for 1-2 weeks after cath.    Navi was seen today for coronary artery disease.    Diagnoses and all orders for this visit:    Ischemic cardiomyopathy  -     [facility] Cath; Future  -     Basic Metabolic Panel; Future  -     CBC & Differential; Future  -     ranolazine (Ranexa) 1000 MG 12 hr tablet; Take 1 tablet by mouth Every 12 (Twelve) Hours.  -     P2Y12 Platelet Inhibition; Future    Precordial pain  -     [facility] Cath; Future  -     Basic Metabolic Panel; Future  -     CBC & Differential; Future  -     ranolazine (Ranexa) 1000 MG 12 hr tablet; Take 1 tablet by mouth Every 12 (Twelve) Hours.  -     P2Y12 Platelet Inhibition; Future    Shortness of breath  -     [facility] Cath; Future  -     Basic Metabolic Panel; Future  -     CBC &  Differential; Future  -     ranolazine (Ranexa) 1000 MG 12 hr tablet; Take 1 tablet by mouth Every 12 (Twelve) Hours.  -     P2Y12 Platelet Inhibition; Future    Positive cardiac stress test  -     [facility] Cath; Future  -     Basic Metabolic Panel; Future  -     CBC & Differential; Future  -     ranolazine (Ranexa) 1000 MG 12 hr tablet; Take 1 tablet by mouth Every 12 (Twelve) Hours.  -     P2Y12 Platelet Inhibition; Future    Coronary artery disease involving native coronary artery of native heart without angina pectoris  -     ranolazine (Ranexa) 1000 MG 12 hr tablet; Take 1 tablet by mouth Every 12 (Twelve) Hours.  -     P2Y12 Platelet Inhibition; Future        Navi Hanley  reports that he has been smoking cigarettes. He has a 30.00 pack-year smoking history. He has quit using smokeless tobacco.. I have educated him on the risk of diseases from using tobacco products such as cancer, COPD and heart diease.     I advised him to quit and he is not willing to quit.    I spent 3  minutes counseling the patient.           Patient's Body mass index is 31.6 kg/m². BMI is above normal parameters. Recommendations include: educational material.           MEDS ORDERED DURING VISIT:  New Medications Ordered This Visit   Medications   • ranolazine (Ranexa) 1000 MG 12 hr tablet     Sig: Take 1 tablet by mouth Every 12 (Twelve) Hours.     Dispense:  60 tablet     Refill:  12           This document has been electronically signed by Anderson Palmer Jr., APRMAHAMED  July 24, 2020 14:01

## 2020-07-21 ENCOUNTER — TELEPHONE (OUTPATIENT)
Dept: CARDIOLOGY | Facility: CLINIC | Age: 45
End: 2020-07-21

## 2020-07-21 NOTE — TELEPHONE ENCOUNTER
Pt LVM stating that he missed my call.     Called pt back and informed him of the message, he stated someone else told him he could do it here. I informed him that there is some time sensitivity w/ that test which doesn't work w/ our couriers. Advised him to go to [facility]  and ask for directions to the lab, he verbalized understanding and stated that he still had his printed order to take w/ him.

## 2020-07-21 NOTE — TELEPHONE ENCOUNTER
Pt LVM stating he saw Jr yesterday and several lab tests were ordered. Stated he had two of them done at PCP's office, but that they were unable to do the P2Y12 PLT test. Stated he needs to know what to do.  #[phone]      Pt will need to get that test done at the hospital.       First attempt to reach pt. Left a voicemail for pt to return my call at [phone], opt 2.  With return call, please inform pt that he can take lab order to [facility] lab to be drawn.

## 2020-08-06 ENCOUNTER — OUTSIDE FACILITY SERVICE (OUTPATIENT)
Dept: CARDIOLOGY | Facility: CLINIC | Age: 45
End: 2020-08-06

## 2020-08-06 DIAGNOSIS — R06.02 SHORTNESS OF BREATH: ICD-10-CM

## 2020-08-06 DIAGNOSIS — R94.39 POSITIVE CARDIAC STRESS TEST: ICD-10-CM

## 2020-08-06 DIAGNOSIS — R07.2 PRECORDIAL PAIN: ICD-10-CM

## 2020-08-06 DIAGNOSIS — I25.5 ISCHEMIC CARDIOMYOPATHY: ICD-10-CM

## 2020-08-06 PROCEDURE — 92937 PRQ TRLUML REVSC CAB GRF 1: CPT | Performed by: INTERNAL MEDICINE

## 2020-08-06 PROCEDURE — 93459 L HRT ART/GRFT ANGIO: CPT | Performed by: INTERNAL MEDICINE

## 2020-08-17 DIAGNOSIS — R07.89 CHEST WALL PAIN: ICD-10-CM

## 2020-08-17 RX ORDER — CYCLOBENZAPRINE HCL 10 MG
TABLET ORAL
Qty: 60 TABLET | Refills: 1 | OUTPATIENT
Start: 2020-08-17

## 2020-08-20 ENCOUNTER — OFFICE VISIT (OUTPATIENT)
Dept: CARDIOLOGY | Facility: CLINIC | Age: 45
End: 2020-08-20

## 2020-08-20 VITALS
HEART RATE: 102 BPM | OXYGEN SATURATION: 98 % | BODY MASS INDEX: 32.73 KG/M2 | DIASTOLIC BLOOD PRESSURE: 82 MMHG | SYSTOLIC BLOOD PRESSURE: 125 MMHG | TEMPERATURE: 97.1 F | HEIGHT: 69 IN | WEIGHT: 221 LBS

## 2020-08-20 DIAGNOSIS — R94.30 CARDIAC LV EJECTION FRACTION 30-35%: ICD-10-CM

## 2020-08-20 DIAGNOSIS — I50.22 CHRONIC SYSTOLIC CONGESTIVE HEART FAILURE (HCC): ICD-10-CM

## 2020-08-20 DIAGNOSIS — R07.2 PRECORDIAL PAIN: Primary | ICD-10-CM

## 2020-08-20 DIAGNOSIS — I25.5 ISCHEMIC CARDIOMYOPATHY: ICD-10-CM

## 2020-08-20 PROCEDURE — 99214 OFFICE O/P EST MOD 30 MIN: CPT | Performed by: NURSE PRACTITIONER

## 2020-08-20 RX ORDER — METOPROLOL SUCCINATE 50 MG/1
50 TABLET, EXTENDED RELEASE ORAL DAILY
Qty: 60 TABLET | Refills: 11 | Status: SHIPPED | OUTPATIENT
Start: 2020-08-20 | End: 2020-11-30 | Stop reason: SDUPTHER

## 2020-08-20 NOTE — PROGRESS NOTES
Subjective     Navi Hanley is a 45 y.o. male who presents to day for Cardiomyopathy (Heart cath 8-6-2020 no new stenting).    CHIEF COMPLIANT  Chief Complaint   Patient presents with   • Cardiomyopathy     Heart cath 8-6-2020 no new stenting       Active Problems:  Problem List Items Addressed This Visit        Cardiovascular and Mediastinum    Ischemic cardiomyopathy    Relevant Medications    metoprolol succinate XL (TOPROL-XL) 50 MG 24 hr tablet    sacubitril-valsartan (Entresto) 24-26 MG tablet    Chronic systolic congestive heart failure (CMS/HCC)    Relevant Medications    metoprolol succinate XL (TOPROL-XL) 50 MG 24 hr tablet    sacubitril-valsartan (Entresto) 24-26 MG tablet       Nervous and Auditory    Chest pain - Primary    Relevant Medications    metoprolol succinate XL (TOPROL-XL) 50 MG 24 hr tablet      Other Visit Diagnoses     Cardiac LV ejection fraction 30-35%        Relevant Medications    sacubitril-valsartan (Entresto) 24-26 MG tablet            Problem list  1.  Coronary artery disease  1.1 cardiac catheterization April 2019 demonstrated high-grade three-vessel disease with an EF of 30% with recommendations for coronary artery bypass grafting  1.2 coronary artery bypass grafting, three-vessel, April 2019 per patient report, inadequate data  1.3.  Stress test 7/20: Severely depressed post stress ejection fraction of 27% with global hypokinesis and basilar inferior lateral lateral akinesis positive scintigraphic the defect compatible with a relatively large posterior lateral infarct and mild to moderate pacheco-infarct ischemia  1.4 left heart catheterization 8/20: LIMA widely patent throughout its course touchdown to the middle third of the LAD with only minor irregularities past the touchdown, RCA was patent throughout its course diffusely diseased distal with segmental 80% narrowing proximal to the distal PDA, SVG was patent throughout its course it appeared to touchdown on a more lateral  branch of the bifurcating diagonal.  The proximal portion of the diagonal connected to the smaller more medial branch with sequential subtotal stenosis, EF 35% unable to pass stent a fear of jeopardizing the larger widely patent branch.  POBA performed with residual stenosis less than 50%  2.  Ischemic cardiomyopathy posterior lateral infarct with mild to moderate pacheco-infarct ischemia post-rest ejection fraction of 27%  2.1 EF estimated 30% by catheterization April 2019  2.2 echocardiogram 7/20: Mild dilation of left ventricle, mildly to moderately reduced ejection fraction of 40 to 45%, borderline concentric left ventricular hypertrophy grade 1 diastolic dysfunction mild left atrial enlargement  3.  Dyslipidemia  4.  Chronic tobacco use  5. Heart catheretization  12- x 2 cardiac stents      HPI  HPI  Mr. Hanley is a 45-year-old male patient has been followed up today for coronary artery disease and cardiomyopathy.  Patient did have a recent heart catheterization which identified disease of the branch of the diagonal system.  After Timothy is unable to place a stent and did use POBA.  This reduced the stenosis to 50% or less.  However patient says that he is not experience any change in his symptoms and he continues to have chest pain especially with exertion such as moving around and is even having associated presyncope the day before yesterday he said he was walking to the kitchen and he started going through a tunnel and had to grab the counter went down to his knee.  Patient's ejection fraction was noted to be 35% per left heart catheterization. patient describes his pain as midsternal and radiates to his left arm.  This is intermittent and last a couple hours at a time.  He also has a numbness sensation in his chest and sort of a skull patches.  He does report that if he showers at times it does seem to help to relax it.  No other treatments other than max antianginal therapy with isosorbide 120 mg  daily and Ranexa thousand milligrams twice daily.  Patient also reports an occasional flutter in his chest.  He states that he still significantly fatigued where he falls asleep during the day and has daytime napping.  This is been occurring ever since he had his open heart surgery.  Patient does get dizzy upon position changes too quickly.  He does also report some level of PND and orthopnea.  Patient denies any syncope, or other neurological changes.        PRIOR MEDS  Current Outpatient Medications on File Prior to Visit   Medication Sig Dispense Refill   • amLODIPine (NORVASC) 2.5 MG tablet Take 1 tablet by mouth Daily. 30 tablet 11   • ASPIRIN LOW DOSE 81 MG EC tablet Take 81 mg by mouth Daily.     • atorvastatin (LIPITOR) 80 MG tablet Take 80 mg by mouth Daily.     • cetirizine (zyrTEC) 10 MG tablet Take 10 mg by mouth Daily.     • clopidogrel (PLAVIX) 75 MG tablet Take 1 tablet by mouth Daily. 30 tablet 11   • cyclobenzaprine (FLEXERIL) 10 MG tablet TAKE 1 TABLET BY MOUTH TWO TIMES A DAY 60 tablet 2   • furosemide (LASIX) 20 MG tablet Take 20 mg by mouth Daily.     • isosorbide mononitrate (IMDUR) 120 MG 24 hr tablet Take 1 tablet by mouth Daily. 30 tablet 11   • MAPAP 325 MG tablet 325 mg As Needed.     • naproxen (NAPROSYN) 500 MG tablet TAKE 1 TABLET BY MOUTH TWO TIMES A DAY WITH MEALS 60 tablet 0   • nitroglycerin (NITROSTAT) 0.4 MG SL tablet 1 under the tongue as needed for angina, may repeat q5mins for up three doses 100 tablet 11   • potassium chloride (K-DUR) 10 MEQ CR tablet 2 (Two) Times a Day.     • ranolazine (Ranexa) 1000 MG 12 hr tablet Take 1 tablet by mouth Every 12 (Twelve) Hours. 60 tablet 12   • SENNA PLUS 8.6-50 MG per tablet 2 (Two) Times a Day.     • [DISCONTINUED] metoprolol succinate XL (TOPROL-XL) 25 MG 24 hr tablet Take 25 mg by mouth Daily.       No current facility-administered medications on file prior to visit.        ALLERGIES  Patient has no known allergies.    HISTORY  Past  Medical History:   Diagnosis Date   • Coronary artery disease    • Hyperlipidemia    • Hypertension    • Myocardial infarction (CMS/HCC)        Social History     Socioeconomic History   • Marital status:      Spouse name: Not on file   • Number of children: Not on file   • Years of education: Not on file   • Highest education level: Not on file   Tobacco Use   • Smoking status: Current Every Day Smoker     Packs/day: 1.00     Years: 30.00     Pack years: 30.00     Types: Cigarettes   • Smokeless tobacco: Former User   Substance and Sexual Activity   • Alcohol use: No     Frequency: Never   • Drug use: No   • Sexual activity: Defer       Family History   Problem Relation Age of Onset   • Heart disease Mother    • Cancer Mother    • Hyperlipidemia Father    • Hypertension Father        Review of Systems   Constitutional: Positive for fatigue. Negative for chills and fever.   HENT: Negative.  Negative for congestion, sinus pressure and sore throat.    Eyes: Positive for visual disturbance (readers).   Respiratory: Positive for chest tightness and shortness of breath.    Cardiovascular: Positive for chest pain and palpitations. Negative for leg swelling.   Gastrointestinal: Negative.  Negative for abdominal pain, blood in stool, constipation, diarrhea, nausea and vomiting.   Endocrine: Negative.  Negative for cold intolerance and heat intolerance.   Genitourinary: Negative.  Negative for dysuria, frequency, hematuria and urgency.   Musculoskeletal: Negative.  Negative for arthralgias, back pain and neck pain.   Skin: Negative.  Negative for rash and wound.   Allergic/Immunologic: Negative.  Negative for environmental allergies and food allergies.   Neurological: Positive for dizziness (with position change or walking at times) and light-headedness. Negative for syncope.   Hematological: Bruises/bleeds easily.   Psychiatric/Behavioral: Positive for sleep disturbance (wakes with soa and cp).       Objective      "    VITALS: /82 (BP Location: Left arm, Patient Position: Sitting)   Pulse 102   Temp 97.1 °F (36.2 °C)   Ht 175.3 cm (69\")   Wt 100 kg (221 lb)   SpO2 98%   BMI 32.64 kg/m²     LABS:   Lab Results (most recent)     None          IMAGING:   No Images in the past 120 days found..    EXAM:  Physical Exam   Constitutional: He is oriented to person, place, and time. He appears well-developed and well-nourished.   HENT:   Head: Normocephalic and atraumatic.   Eyes: Pupils are equal, round, and reactive to light. EOM are normal.   Neck: Neck supple. No JVD present. Carotid bruit is not present.   Cardiovascular: Normal rate, regular rhythm, normal heart sounds and intact distal pulses. Exam reveals no gallop.   No murmur heard.  Pulses:       Carotid pulses are 2+ on the right side, and 2+ on the left side.       Radial pulses are 2+ on the right side, and 2+ on the left side.        Posterior tibial pulses are 2+ on the right side, and 2+ on the left side.   Pulmonary/Chest: Effort normal. No respiratory distress. He has wheezes.   Abdominal: Soft. Bowel sounds are normal. He exhibits no distension. There is no tenderness.   Musculoskeletal: Normal range of motion. He exhibits edema (trace).   Neurological: He is alert and oriented to person, place, and time. He has normal strength. No cranial nerve deficit or sensory deficit.   Skin: Skin is warm, dry and intact.   Psychiatric: He has a normal mood and affect. His speech is normal and behavior is normal. Judgment and thought content normal. Cognition and memory are normal.   Nursing note and vitals reviewed.      Procedure   Procedures       Assessment/Plan    Diagnosis Plan   1. Precordial pain  metoprolol succinate XL (TOPROL-XL) 50 MG 24 hr tablet   2. Ischemic cardiomyopathy  metoprolol succinate XL (TOPROL-XL) 50 MG 24 hr tablet    sacubitril-valsartan (Entresto) 24-26 MG tablet   3. Chronic systolic congestive heart failure (CMS/HCC)  metoprolol " succinate XL (TOPROL-XL) 50 MG 24 hr tablet    sacubitril-valsartan (Entresto) 24-26 MG tablet   4. Cardiac LV ejection fraction 30-35%  sacubitril-valsartan (Entresto) 24-26 MG tablet   1.  Patient is continued to have chest pain and tachycardia.  His heart rate is 102 today.  Therefore we will increase his metoprolol to 50 mg daily.  He was advised to monitor his blood pressure and heart rate on a regular basis report any significant highs or lows.  2.  We will continue to maximize medication therapy by adding Entresto 2426 twice daily.  Hopefully this will help increase his ejection fraction and quality of life.  His most recent ejection fraction for left heart catheterization was 35%.  All other medications are appropriate for the treatment of ischemic cardiomyopathy.  We will follow patient back up in 3 months for repeat echocardiogram and determine if patient needs a implantable cardioverter defibrillator.  3.  Informed of signs and symptoms of ACS and advised to seek emergent treatment for any new worsening symptoms.  Patient also advised sooner follow-up as needed.  Also advised to follow-up with family doctor as needed  This note is dictated utilizing voice recognition software.  Although this record has been proof read, transcriptional errors may still be present. If questions occur regarding the content of this record please do not hesitate to call our office.    Return in about 3 months (around 11/20/2020), or if symptoms worsen or fail to improve.    Navi was seen today for cardiomyopathy.    Diagnoses and all orders for this visit:    Precordial pain  -     metoprolol succinate XL (TOPROL-XL) 50 MG 24 hr tablet; Take 1 tablet by mouth Daily.    Ischemic cardiomyopathy  -     metoprolol succinate XL (TOPROL-XL) 50 MG 24 hr tablet; Take 1 tablet by mouth Daily.  -     sacubitril-valsartan (Entresto) 24-26 MG tablet; Take 1 tablet by mouth 2 (Two) Times a Day.    Chronic systolic congestive heart  failure (CMS/[facility])  -     metoprolol succinate XL (TOPROL-XL) 50 MG 24 hr tablet; Take 1 tablet by mouth Daily.  -     sacubitril-valsartan (Entresto) 24-26 MG tablet; Take 1 tablet by mouth 2 (Two) Times a Day.    Cardiac LV ejection fraction 30-35%  -     sacubitril-valsartan (Entresto) 24-26 MG tablet; Take 1 tablet by mouth 2 (Two) Times a Day.        Navi Hanley  reports that he has been smoking cigarettes. He has a 30.00 pack-year smoking history. He has quit using smokeless tobacco.. I have educated him on the risk of diseases from using tobacco products such as cancer, COPD and heart diease.     I advised him to quit and he is not willing to quit.    I spent 3  minutes counseling the patient.           Patient's Body mass index is 32.64 kg/m². BMI is above normal parameters. Recommendations include: educational material.           MEDS ORDERED DURING VISIT:  New Medications Ordered This Visit   Medications   • metoprolol succinate XL (TOPROL-XL) 50 MG 24 hr tablet     Sig: Take 1 tablet by mouth Daily.     Dispense:  60 tablet     Refill:  11   • sacubitril-valsartan (Entresto) 24-26 MG tablet     Sig: Take 1 tablet by mouth 2 (Two) Times a Day.     Dispense:  60 tablet     Refill:  11           This document has been electronically signed by Anderson Palmer Jr., APRN  August 20, 2020 09:43

## 2020-08-20 NOTE — PATIENT INSTRUCTIONS
Steps to Quit Smoking  Smoking tobacco is the leading cause of preventable death. It can affect almost every organ in the body. Smoking puts you and people around you at risk for many serious, long-lasting (chronic) diseases. Quitting smoking can be hard, but it is one of the best things that you can do for your health. It is never too late to quit.  How do I get ready to quit?  When you decide to quit smoking, make a plan to help you succeed. Before you quit:  · Pick a date to quit. Set a date within the next 2 weeks to give you time to prepare.  · Write down the reasons why you are quitting. Keep this list in places where you will see it often.  · Tell your family, friends, and co-workers that you are quitting. Their support is important.  · Talk with your doctor about the choices that may help you quit.  · Find out if your health insurance will pay for these treatments.  · Know the people, places, things, and activities that make you want to smoke (triggers). Avoid them.  What first steps can I take to quit smoking?  · Throw away all cigarettes at home, at work, and in your car.  · Throw away the things that you use when you smoke, such as ashtrays and lighters.  · Clean your car. Make sure to empty the ashtray.  · Clean your home, including curtains and carpets.  What can I do to help me quit smoking?  Talk with your doctor about taking medicines and seeing a counselor at the same time. You are more likely to succeed when you do both.  · If you are pregnant or breastfeeding, talk with your doctor about counseling or other ways to quit smoking. Do not take medicine to help you quit smoking unless your doctor tells you to do so.  To quit smoking:  Quit right away  · Quit smoking totally, instead of slowly cutting back on how much you smoke over a period of time.  · Go to counseling. You are more likely to quit if you go to counseling sessions regularly.  Take medicine  You may take medicines to help you quit. Some  medicines need a prescription, and some you can buy over-the-counter. Some medicines may contain a drug called nicotine to replace the nicotine in cigarettes. Medicines may:  · Help you to stop having the desire to smoke (cravings).  · Help to stop the problems that come when you stop smoking (withdrawal symptoms).  Your doctor may ask you to use:  · Nicotine patches, gum, or lozenges.  · Nicotine inhalers or sprays.  · Non-nicotine medicine that is taken by mouth.  Find resources  Find resources and other ways to help you quit smoking and remain smoke-free after you quit. These resources are most helpful when you use them often. They include:  · Online chats with a counselor.  · Phone quitlines.  · Printed self-help materials.  · Support groups or group counseling.  · Text messaging programs.  · Mobile phone apps. Use apps on your mobile phone or tablet that can help you stick to your quit plan. There are many free apps for mobile phones and tablets as well as websites. Examples include Quit Guide from the CDC and smokefree.gov    What things can I do to make it easier to quit?    · Talk to your family and friends. Ask them to support and encourage you.  · Call a phone quitline (6-401-QUITNOW), reach out to support groups, or work with a counselor.  · Ask people who smoke to not smoke around you.  · Avoid places that make you want to smoke, such as:  ? Bars.  ? Parties.  ? Smoke-break areas at work.  · Spend time with people who do not smoke.  · Lower the stress in your life. Stress can make you want to smoke. Try these things to help your stress:  ? Getting regular exercise.  ? Doing deep-breathing exercises.  ? Doing yoga.  ? Meditating.  ? Doing a body scan. To do this, close your eyes, focus on one area of your body at a time from head to toe. Notice which parts of your body are tense. Try to relax the muscles in those areas.  How will I feel when I quit smoking?  Day 1 to 3 weeks  Within the first 24 hours,  you may start to have some problems that come from quitting tobacco. These problems are very bad 2-3 days after you quit, but they do not often last for more than 2-3 weeks. You may get these symptoms:  · Mood swings.  · Feeling restless, nervous, angry, or annoyed.  · Trouble concentrating.  · Dizziness.  · Strong desire for high-sugar foods and nicotine.  · Weight gain.  · Trouble pooping (constipation).  · Feeling like you may vomit (nausea).  · Coughing or a sore throat.  · Changes in how the medicines that you take for other issues work in your body.  · Depression.  · Trouble sleeping (insomnia).  Week 3 and afterward  After the first 2-3 weeks of quitting, you may start to notice more positive results, such as:  · Better sense of smell and taste.  · Less coughing and sore throat.  · Slower heart rate.  · Lower blood pressure.  · Clearer skin.  · Better breathing.  · Fewer sick days.  Quitting smoking can be hard. Do not give up if you fail the first time. Some people need to try a few times before they succeed. Do your best to stick to your quit plan, and talk with your doctor if you have any questions or concerns.  Summary  · Smoking tobacco is the leading cause of preventable death. Quitting smoking can be hard, but it is one of the best things that you can do for your health.  · When you decide to quit smoking, make a plan to help you succeed.  · Quit smoking right away, not slowly over a period of time.  · When you start quitting, seek help from your doctor, family, or friends.  This information is not intended to replace advice given to you by your health care provider. Make sure you discuss any questions you have with your health care provider.  Document Released: 10/14/2010 Document Revised: 03/06/2020 Document Reviewed: 03/07/2020  [employer]vier Patient Education © 2020 [employer]  BMI for Adults    Body mass index (BMI) is a number that is calculated from a person's weight and height. BMI may help to  "estimate how much of a person's weight is composed of fat. BMI can help identify those who may be at higher risk for certain medical problems.  How is BMI used with adults?  BMI is used as a screening tool to identify possible weight problems. It is used to check whether a person is obese, overweight, healthy weight, or underweight.  How is BMI calculated?  BMI measures your weight and compares it to your height. This can be done either in English ([place] or metric measurements. Note that charts are available to help you find your BMI quickly and easily without having to do these calculations yourself.  To calculate your BMI in English ([place] measurements, your health care provider will:  1. Measure your weight in pounds (lb).  2. Multiply the number of pounds by 703.  ? For example, for a person who weighs 180 lb, multiply that number by 703, which equals 126,540.  3. Measure your height in inches (in). Then multiply that number by itself to get a measurement called \"inches squared.\"  ? For example, for a person who is 70 in tall, the \"inches squared\" measurement is 70 in x 70 in, which equals 4900 inches squared.  4. Divide the total from Step 2 (number of lb x 703) by the total from Step 3 (inches squared): 126,540 ÷ 4900 = 25.8. This is your BMI.  To calculate your BMI in metric measurements, your health care provider will:  1. Measure your weight in kilograms (kg).  2. Measure your height in meters (m). Then multiply that number by itself to get a measurement called \"meters squared.\"  ? For example, for a person who is 1.75 m tall, the \"meters squared\" measurement is 1.75 m x 1.75 m, which is equal to 3.1 meters squared.  3. Divide the number of kilograms (your weight) by the meters squared number. In this example: 70 ÷ 3.1 = 22.6. This is your BMI.  How is BMI interpreted?  To interpret your results, your health care provider will use BMI charts to identify whether you are underweight, normal weight, " overweight, or obese. The following guidelines will be used:  · Underweight: BMI less than 18.5.  · Normal weight: BMI between 18.5 and 24.9.  · Overweight: BMI between 25 and 29.9.  · Obese: BMI of 30 and above.  Please note:  · Weight includes both fat and muscle, so someone with a muscular build, such as an athlete, may have a BMI that is higher than 24.9. In cases like these, BMI is not an accurate measure of body fat.  · To determine if excess body fat is the cause of a BMI of 25 or higher, further assessments may need to be done by a health care provider.  · BMI is usually interpreted in the same way for men and women.  Why is BMI a useful tool?  BMI is useful in two ways:  · Identifying a weight problem that may be related to a medical condition, or that may increase the risk for medical problems.  · Promoting lifestyle and diet changes in order to reach a healthy weight.  Summary  · Body mass index (BMI) is a number that is calculated from a person's weight and height.  · BMI may help to estimate how much of a person's weight is composed of fat. BMI can help identify those who may be at higher risk for certain medical problems.  · BMI can be measured using English measurements or metric measurements.  · To interpret your results, your health care provider will use BMI charts to identify whether you are underweight, normal weight, overweight, or obese.  This information is not intended to replace advice given to you by your health care provider. Make sure you discuss any questions you have with your health care provider.  Document Released: 08/29/2005 Document Revised: 11/30/2018 Document Reviewed: 10/31/2018  [employer] Patient Education © 2020 [employer]    Acute Coronary Syndrome  Acute coronary syndrome (ACS) is a serious problem in which there is suddenly not enough blood and oxygen reaching the heart. ACS can result in chest pain or a heart attack.  This condition is a medical emergency. If you have  any symptoms of this condition, get help right away.  What are the causes?  This condition may be caused by:  · A buildup of fat and cholesterol inside the arteries (atherosclerosis). This is the most common cause. The buildup (plaque) can cause blood vessels in the heart (coronary arteries) to become narrow or blocked, which reduces blood flow to the heart. Plaque can also break off and lead to a clot, which can block an artery and cause a heart attack or stroke.  · Sudden tightening of the muscles around the coronary arteries (coronary spasm).  · Tearing of a coronary artery (spontaneous coronary artery dissection).  · Very low blood pressure (hypotension).  · An abnormal heartbeat (arrhythmia).  · Other medical conditions that cause a decrease of oxygen to the heart, such as anemiaorrespiratory failure.  · Using cocaine or methamphetamine.  What increases the risk?  The following factors may make you more likely to develop this condition:  · Age. The risk for ACS increases as you get older.  · History of chest pain, heart attack, peripheral artery disease, or stroke.  · Having taken chemotherapy or immune-suppressing medicines.  · Being male.  · Family history of chest pain, heart disease, or stroke.  · Smoking.  · Not exercising enough.  · Being overweight.  · High cholesterol.  · High blood pressure (hypertension).  · Diabetes.  · Excessive alcohol use.  What are the signs or symptoms?  Common symptoms of this condition include:  · Chest pain. The pain may last a long time, or it may stop and come back (recur). It may feel like:  ? Crushing or squeezing.  ? Tightness, pressure, fullness, or heaviness.  · Arm, neck, jaw, or back pain.  · Heartburn or indigestion.  · Shortness of breath.  · Nausea.  · Sudden cold sweats.  · Light-headedness.  · Dizziness or passing out.  · Tiredness (fatigue).  Sometimes there are no symptoms.  How is this diagnosed?  This condition may be diagnosed based on:  · Your medical  history and symptoms.  · Imaging tests, such as:  ? An electrocardiogram (ECG). This measures the heart's electrical activity.  ? X-rays.  ? CT scan.  ? A coronary angiogram. For this test, dye is injected into the heart arteries and then X-rays are taken.  ? Myocardial perfusion imaging. This test shows how well blood flows through your heart muscle.  · Blood tests. These may be repeated at certain time intervals.  · Exercise stress testing.  · Echocardiogram. This is a test that uses sound waves to produce detailed images of the heart.  How is this treated?  Treatment for this condition may include:  · Oxygen therapy.  · Medicines, such as:  ? Antiplatelet medicines and blood-thinning medicines, such as aspirin. These help prevent blood clots.  ? Medicine that dissolves any blood clots (fibrinolytic therapy).  ? Blood pressure medicines.  ? Nitroglycerin. This helps widen blood vessels to improve blood flow.  ? Pain medicine.  ? Cholesterol-lowering medicine.  · Surgery, such as:  ? Coronary angioplasty with stent placement. This involves placing a small piece of metal that looks like mesh or a spring into a narrow coronary artery. This widens the artery and keeps it open.  ? Coronary artery bypass surgery. This involves taking a section of a blood vessel from a different part of your body and placing it on the blocked coronary artery to allow blood to flow around the blockage.  · Cardiac rehabilitation. This is a program that includes exercise training, education, and counseling to help you recover.  Follow these instructions at home:  Eating and drinking  · Eat a heart-healthy diet that includes whole grains, fruits and vegetables, lean proteins, and low-fat or nonfat dairy products.  · Limit how much salt (sodium) you eat as told by your health care provider. Follow instructions from your health care provider about any other eating or drinking restrictions, such as limiting foods that are high in fat and  processed sugars.  · Use healthy cooking methods such as roasting, grilling, broiling, baking, poaching, steaming, or stir-frying.  · Work with a dietitian to follow a heart-healthy eating plan.  Medicines  · Take over-the-counter and prescription medicines only as told by your health care provider.  · Do not take these medicines unless your health care provider approves:  ? Vitamin supplements that contain vitamin A or vitamin E.  ? NSAIDs, such as ibuprofen, naproxen, or celecoxib.  ? Hormone replacement therapy that contains estrogen.  · If you are taking blood thinners:  ? Talk with your health care provider before you take any medicines that contain aspirin or NSAIDs. These medicines increase your risk for dangerous bleeding.  ? Take your medicine exactly as told, at the same time every day.  ? Avoid activities that could cause injury or bruising, and follow instructions about how to prevent falls.  ? Wear a medical alert bracelet, and carry a card that lists what medicines you take.  Activity  · Follow your cardiac rehabilitation program. Do exercises as told by your physical therapist.  · Ask your health care provider what activities and exercises are safe for you. Follow his or her instructions about lifting, driving, or climbing stairs.  Lifestyle  · Do not use any products that contain nicotine or tobacco, such as cigarettes, e-cigarettes, and chewing tobacco. If you need help quitting, ask your health care provider.  · Do not drink alcohol if your health care provider tells you not to drink.  · If you drink alcohol:  ? Limit how much you have to 0-1 drink a day.  ? Be aware of how much alcohol is in your drink. In the [place] one drink equals one 12 oz bottle of beer (355 mL), one 5 oz glass of wine (148 mL), or one 1½ oz glass of hard liquor (44 mL).  · Maintain a healthy weight. If you need to lose weight, work with your health care provider to do so safely.  General instructions  · Tell all the health  care providers who provide care for you about your heart condition, including your dentist. This may affect the medicines or treatment you receive.  · Manage any other health conditions you have, such as hypertension or diabetes. These conditions affect your heart.  · Pay attention to your mental health. You may be at higher risk for depression.  ? Find ways to manage stress.  ? Talk to your health care provider about depression screening and treatment.  · Keep your vaccinations up to date.  ? Get the flu shot (influenza vaccine) every year.  ? Get the pneumococcal vaccine if you are age 65 or older.  · If directed, monitor your blood pressure at home.  · Keep all follow-up visits as told by your health care provider. This is important.  Contact a health care provider if you:  · Feel overwhelmed or sad.  · Have trouble doing your daily activities.  Get help right away if you:  · Have pain in your chest, neck, arm, jaw, stomach, or back that recurs, and:  ? It lasts for more than a few minutes.  ? It is not relieved by taking the medicineyour health care provider prescribed.  · Have unexplained:  ? Heavy sweating.  ? Heartburn or indigestion.  ? Nausea or vomiting.  ? Shortness of breath.  ? Difficulty breathing.  ? Fatigue.  ? Nervousness or anxiety.  ? Weakness.  ? Diarrhea.  ? Dark stools or blood in your stool.  · Have sudden light-headedness or dizziness.  · Have blood pressure that is higher than 180/120.  · Faint.  · Have thoughts about hurting yourself.  These symptoms may represent a serious problem that is an emergency. Do not wait to see if the symptoms will go away. Get medical help right away. Call your local emergency services (911 in the [place] Do not drive yourself to the hospital.   Summary  · Acute coronary syndrome (ACS) is when there is not enough blood and oxygen being supplied to the heart. ACS can result in chest pain or a heart attack.  · Acute coronary syndrome is a medical emergency. If you  have any symptoms of this condition, get help right away.  · Treatment includes medicines and procedures to open the blocked arteries and restore blood flow.  This information is not intended to replace advice given to you by your health care provider. Make sure you discuss any questions you have with your health care provider.  Document Released: 12/18/2006 Document Revised: 05/20/2020 Document Reviewed: 12/30/2019  [employer] Patient Education © 2020 [employer]

## 2020-08-24 ENCOUNTER — PRIOR AUTHORIZATION (OUTPATIENT)
Dept: CARDIOLOGY | Facility: CLINIC | Age: 45
End: 2020-08-24

## 2020-09-15 DIAGNOSIS — R07.2 PRECORDIAL PAIN: ICD-10-CM

## 2020-09-15 DIAGNOSIS — R07.89 CHEST WALL PAIN: ICD-10-CM

## 2020-09-15 RX ORDER — CYCLOBENZAPRINE HCL 10 MG
TABLET ORAL
Qty: 60 TABLET | Refills: 2 | OUTPATIENT
Start: 2020-09-15

## 2020-09-15 RX ORDER — NAPROXEN 500 MG/1
TABLET ORAL
Qty: 60 TABLET | Refills: 0 | OUTPATIENT
Start: 2020-09-15

## 2020-10-16 DIAGNOSIS — R07.2 PRECORDIAL PAIN: ICD-10-CM

## 2020-10-16 DIAGNOSIS — R07.89 CHEST WALL PAIN: ICD-10-CM

## 2020-10-19 RX ORDER — CYCLOBENZAPRINE HCL 10 MG
TABLET ORAL
Qty: 60 TABLET | Refills: 2 | OUTPATIENT
Start: 2020-10-19

## 2020-10-19 RX ORDER — NAPROXEN 500 MG/1
TABLET ORAL
Qty: 60 TABLET | Refills: 0 | OUTPATIENT
Start: 2020-10-19

## 2020-11-14 DIAGNOSIS — R07.89 CHEST WALL PAIN: ICD-10-CM

## 2020-11-14 DIAGNOSIS — R07.2 PRECORDIAL PAIN: ICD-10-CM

## 2020-11-16 RX ORDER — NAPROXEN 500 MG/1
TABLET ORAL
Qty: 60 TABLET | Refills: 0 | Status: SHIPPED | OUTPATIENT
Start: 2020-11-16

## 2020-11-16 RX ORDER — CLOPIDOGREL BISULFATE 75 MG/1
TABLET ORAL
Qty: 30 TABLET | Refills: 10 | Status: SHIPPED | OUTPATIENT
Start: 2020-11-16 | End: 2021-11-23

## 2020-11-16 RX ORDER — CYCLOBENZAPRINE HCL 10 MG
TABLET ORAL
Qty: 60 TABLET | Refills: 2 | Status: SHIPPED | OUTPATIENT
Start: 2020-11-16 | End: 2021-03-22

## 2020-11-23 ENCOUNTER — OFFICE VISIT (OUTPATIENT)
Dept: CARDIOLOGY | Facility: CLINIC | Age: 45
End: 2020-11-23

## 2020-11-23 VITALS
HEART RATE: 92 BPM | SYSTOLIC BLOOD PRESSURE: 95 MMHG | HEIGHT: 69 IN | WEIGHT: 216 LBS | DIASTOLIC BLOOD PRESSURE: 76 MMHG | OXYGEN SATURATION: 98 % | BODY MASS INDEX: 31.99 KG/M2

## 2020-11-23 DIAGNOSIS — R94.30 CARDIAC LV EJECTION FRACTION 30-35%: ICD-10-CM

## 2020-11-23 DIAGNOSIS — R06.02 SHORTNESS OF BREATH: ICD-10-CM

## 2020-11-23 DIAGNOSIS — R07.89 CHEST WALL PAIN: ICD-10-CM

## 2020-11-23 DIAGNOSIS — I25.5 ISCHEMIC CARDIOMYOPATHY: Primary | ICD-10-CM

## 2020-11-23 DIAGNOSIS — R55 SYNCOPE AND COLLAPSE: ICD-10-CM

## 2020-11-23 DIAGNOSIS — R42 DIZZINESS: ICD-10-CM

## 2020-11-23 DIAGNOSIS — R00.2 PALPITATIONS: ICD-10-CM

## 2020-11-23 DIAGNOSIS — R07.2 PRECORDIAL PAIN: ICD-10-CM

## 2020-11-23 PROCEDURE — 99214 OFFICE O/P EST MOD 30 MIN: CPT | Performed by: NURSE PRACTITIONER

## 2020-11-23 PROCEDURE — 93000 ELECTROCARDIOGRAM COMPLETE: CPT | Performed by: NURSE PRACTITIONER

## 2020-11-23 RX ORDER — ISOSORBIDE MONONITRATE 60 MG/1
60 TABLET, EXTENDED RELEASE ORAL EVERY MORNING
Qty: 30 TABLET | Refills: 11 | Status: SHIPPED | OUTPATIENT
Start: 2020-11-23 | End: 2021-06-24

## 2020-11-23 NOTE — PROGRESS NOTES
Subjective     Navi Hanley is a 45 y.o. male who presents to day for Cardiomyopathy (3 month follow up) and Loss of Consciousness (Twice in the past month).    CHIEF COMPLIANT  Chief Complaint   Patient presents with   • Cardiomyopathy     3 month follow up   • Loss of Consciousness     Twice in the past month       Active Problems:  Problem List Items Addressed This Visit        Cardiovascular and Mediastinum    Ischemic cardiomyopathy - Primary    Relevant Medications    isosorbide mononitrate (IMDUR) 60 MG 24 hr tablet    Other Relevant Orders    ECG 12 Lead    Adult Transthoracic Echo Complete W/ Cont if Necessary Per Protocol       Respiratory    Shortness of breath    Relevant Orders    ECG 12 Lead    Adult Transthoracic Echo Complete W/ Cont if Necessary Per Protocol       Nervous and Auditory    Chest pain    Relevant Orders    Adult Transthoracic Echo Complete W/ Cont if Necessary Per Protocol      Other Visit Diagnoses     Cardiac LV ejection fraction 30-35%        Relevant Orders    Adult Transthoracic Echo Complete W/ Cont if Necessary Per Protocol    Dizziness        Relevant Orders    Duplex Carotid Ultrasound CAR    Adult Transthoracic Echo Complete W/ Cont if Necessary Per Protocol    Syncope and collapse        Relevant Orders    Duplex Carotid Ultrasound CAR    Adult Transthoracic Echo Complete W/ Cont if Necessary Per Protocol    Palpitations        Relevant Orders    Adult Transthoracic Echo Complete W/ Cont if Necessary Per Protocol    Cardiac Event Monitor    Chest wall pain        Relevant Medications    isosorbide mononitrate (IMDUR) 60 MG 24 hr tablet      Problem list  1.  Coronary artery disease  1.1 cardiac catheterization April 2019 demonstrated high-grade three-vessel disease with an EF of 30% with recommendations for coronary artery bypass grafting  1.2 coronary artery bypass grafting, three-vessel, April 2019 per patient report, inadequate data  1.3.  Stress test 7/20: Severely  depressed post stress ejection fraction of 27% with global hypokinesis and basilar inferior lateral lateral akinesis positive scintigraphic the defect compatible with a relatively large posterior lateral infarct and mild to moderate pacheco-infarct ischemia  1.4 left heart catheterization 8/20: LIMA widely patent throughout its course touchdown to the middle third of the LAD with only minor irregularities past the touchdown, RCA was patent throughout its course diffusely diseased distal with segmental 80% narrowing proximal to the distal PDA, SVG was patent throughout its course it appeared to touchdown on a more lateral branch of the bifurcating diagonal.  The proximal portion of the diagonal connected to the smaller more medial branch with sequential subtotal stenosis, EF 35% unable to pass stent a fear of jeopardizing the larger widely patent branch.  POBA performed with residual stenosis less than 50%  2.  Ischemic cardiomyopathy posterior lateral infarct with mild to moderate pacheco-infarct ischemia post-rest ejection fraction of 27%  2.1 EF estimated 30% by catheterization April 2019  2.2 echocardiogram 7/20: Mild dilation of left ventricle, mildly to moderately reduced ejection fraction of 40 to 45%, borderline concentric left ventricular hypertrophy grade 1 diastolic dysfunction mild left atrial enlargement  3.  Dyslipidemia  4.  Chronic tobacco use  5. Heart catheretization  12- x 2 cardiac stents    HPI  HPI  Mr. Hanley is a 45-year-old male patient who is being followed up today for coronary artery disease.  Patient still having persistent chronic chest pain that is intermittent in nature that is midsternal and radiates to his left arm.  Usually last for at least an hour with each episode.  He reports that it is definitely worse with exertion and he does have associated shortness of air fatigue and dizziness.  He says walking from the car to the front door or carrying groceries is pretty much the  extent which she can do.  He has received no relief from the aggressive antianginal therapy in which he is on.  No relieving factors have been able to be identified.  He does report that the pain is not overly severe and only occurs when he extremely exerts himself.  He is on isosorbide Ranexa and amlodipine for antianginal therapy.  Patient also reports that he has been syncopal x2 over the last month in which when he stands up he gets extremely dizzy and passes out.  He previously reports that on position changes that he felt like he is on a pass out but he did grab the counter weight a few seconds and it would resolve.  However with the last 2 episodes this was not the case.  He did not lose bowel or bladder control and he is only out for a few seconds.  Patient also has chronic shortness of air with exertion which he exerts very little audiograms.  Extremely dyspneic he does have associated PND and fatigue.  He says that he feels like he runs out of steam.  He describes it as someone pushing the gas on a car and someone else pushing down against the brake.  Patient also reports occasional chronic palpitations where she skips beats at times.  This is not very frequently and occurs intermittently.  Patient did undergo orthostatics today with lying 97/72 heart rate is 79.  Sitting 102/72 heart rate of 84.  Standing 97/76 heart rate of 93.  I do feel that the dizziness may be associated some with hypotension due to the fact that his blood pressure is 95/76 heart rate of 92 today.  He did recently go under left heart catheterization that identified significant disease and a diagonal which was unable to be stented and potable was performed decreasing the stenosis to 50%.  Further aggressive intervention was aborted due to likely compromise of the greater larger vessel.  He denies any lower extremity edema, orthopnea, or other neurological changes other than mentioned above.  PRIOR MEDS  Current Outpatient Medications  on File Prior to Visit   Medication Sig Dispense Refill   • amLODIPine (NORVASC) 2.5 MG tablet Take 1 tablet by mouth Daily. 30 tablet 11   • ASPIRIN LOW DOSE 81 MG EC tablet Take 81 mg by mouth Daily.     • atorvastatin (LIPITOR) 80 MG tablet Take 80 mg by mouth Daily.     • cetirizine (zyrTEC) 10 MG tablet Take 10 mg by mouth Daily.     • clopidogrel (PLAVIX) 75 MG tablet TAKE 1 TABLET BY MOUTH ONCE DAILY 30 tablet 10   • cyclobenzaprine (FLEXERIL) 10 MG tablet TAKE 1 TABLET BY MOUTH TWO TIMES A DAY 60 tablet 2   • furosemide (LASIX) 20 MG tablet Take 20 mg by mouth Daily.     • MAPAP 325 MG tablet 325 mg As Needed.     • metoprolol succinate XL (TOPROL-XL) 50 MG 24 hr tablet Take 1 tablet by mouth Daily. 60 tablet 11   • naproxen (NAPROSYN) 500 MG tablet TAKE 1 TABLET BY MOUTH TWO TIMES A DAY WITH MEALS 60 tablet 0   • nitroglycerin (NITROSTAT) 0.4 MG SL tablet 1 under the tongue as needed for angina, may repeat q5mins for up three doses 100 tablet 11   • potassium chloride (K-DUR) 10 MEQ CR tablet 2 (Two) Times a Day.     • ranolazine (Ranexa) 1000 MG 12 hr tablet Take 1 tablet by mouth Every 12 (Twelve) Hours. 60 tablet 12   • sacubitril-valsartan (Entresto) 24-26 MG tablet Take 1 tablet by mouth 2 (Two) Times a Day. 60 tablet 11   • SENNA PLUS 8.6-50 MG per tablet 2 (Two) Times a Day.     • [DISCONTINUED] isosorbide mononitrate (IMDUR) 120 MG 24 hr tablet Take 1 tablet by mouth Daily. 30 tablet 11     No current facility-administered medications on file prior to visit.        ALLERGIES  Patient has no known allergies.    HISTORY  Past Medical History:   Diagnosis Date   • Coronary artery disease    • Hyperlipidemia    • Hypertension    • Myocardial infarction (CMS/[facility])        Social History     Socioeconomic History   • Marital status:      Spouse name: Not on file   • Number of children: Not on file   • Years of education: Not on file   • Highest education level: Not on file   Tobacco Use   • Smoking  "status: Current Every Day Smoker     Packs/day: 1.00     Years: 30.00     Pack years: 30.00     Types: Cigarettes   • Smokeless tobacco: Former User   Substance and Sexual Activity   • Alcohol use: No     Frequency: Never   • Drug use: No   • Sexual activity: Defer       Family History   Problem Relation Age of Onset   • Heart disease Mother    • Cancer Mother    • Hyperlipidemia Father    • Hypertension Father        Review of Systems   Constitutional: Positive for fatigue. Negative for chills and fever.   HENT: Negative.  Negative for congestion, sinus pressure and sore throat.    Eyes: Positive for visual disturbance (readers).   Respiratory: Positive for shortness of breath. Negative for chest tightness.    Cardiovascular: Positive for chest pain (at times) and palpitations (skips at times). Negative for leg swelling.   Gastrointestinal: Negative.  Negative for abdominal pain, blood in stool, constipation, diarrhea, nausea and vomiting.   Endocrine: Negative for cold intolerance and heat intolerance.   Genitourinary: Negative.  Negative for dysuria, frequency, hematuria and urgency.   Musculoskeletal: Negative.  Negative for arthralgias, back pain and neck pain.   Skin: Negative.  Negative for rash and wound.   Allergic/Immunologic: Positive for environmental allergies.   Neurological: Positive for dizziness (daily), syncope (twice in the past month) and light-headedness (daily).   Hematological: Bruises/bleeds easily (bruises).   Psychiatric/Behavioral: Positive for sleep disturbance (wakes gasping for air, denies cp).       Objective     VITALS: BP 95/76 (BP Location: Left arm, Patient Position: Sitting)   Pulse 92   Ht 175.3 cm (69\")   Wt 98 kg (216 lb)   SpO2 98%   BMI 31.90 kg/m²     LABS:   Lab Results (most recent)     None          IMAGING:   No Images in the past 120 days found..    EXAM:  Physical Exam  Vitals signs and nursing note reviewed.   Constitutional:       Appearance: Normal appearance. " He is well-developed.   HENT:      Head: Normocephalic and atraumatic.   Eyes:      Pupils: Pupils are equal, round, and reactive to light.   Neck:      Musculoskeletal: Neck supple.      Vascular: No carotid bruit or JVD.   Cardiovascular:      Rate and Rhythm: Normal rate and regular rhythm.      Pulses:           Carotid pulses are 2+ on the right side and 2+ on the left side.       Radial pulses are 2+ on the right side and 2+ on the left side.        Posterior tibial pulses are 2+ on the right side and 2+ on the left side.      Heart sounds: Murmur present. No gallop.    Pulmonary:      Effort: Pulmonary effort is normal. No respiratory distress.      Breath sounds: Normal breath sounds.   Abdominal:      General: Bowel sounds are normal. There is no distension.      Palpations: Abdomen is soft.      Tenderness: There is no abdominal tenderness.   Musculoskeletal: Normal range of motion.         General: No swelling.   Skin:     General: Skin is warm and dry.   Neurological:      Mental Status: He is alert and oriented to person, place, and time.      Cranial Nerves: No cranial nerve deficit.      Sensory: No sensory deficit.   Psychiatric:         Mood and Affect: Mood normal.         Speech: Speech normal.         Behavior: Behavior normal.         Thought Content: Thought content normal.         Judgment: Judgment normal.         Procedure     ECG 12 Lead    Date/Time: 11/23/2020 8:18 AM  Performed by: Anderson Palmer APRN  Authorized by: Anderson Palmer APRN   Comparison: compared with previous ECG from 4/23/2019  Similar to previous ECG  Rate: normal  BPM: 85  QRS axis: normal  Other findings: non-specific ST-T wave changes  Comments:  ms  No acute changes               Assessment/Plan    Diagnosis Plan   1. Ischemic cardiomyopathy  ECG 12 Lead    Adult Transthoracic Echo Complete W/ Cont if Necessary Per Protocol   2. Shortness of breath  ECG 12 Lead    Adult Transthoracic Echo Complete W/ Cont  if Necessary Per Protocol   3. Precordial pain  Adult Transthoracic Echo Complete W/ Cont if Necessary Per Protocol   4. Cardiac LV ejection fraction 30-35%  Adult Transthoracic Echo Complete W/ Cont if Necessary Per Protocol   5. Dizziness  Duplex Carotid Ultrasound CAR    Adult Transthoracic Echo Complete W/ Cont if Necessary Per Protocol   6. Syncope and collapse  Duplex Carotid Ultrasound CAR    Adult Transthoracic Echo Complete W/ Cont if Necessary Per Protocol   7. Palpitations  Adult Transthoracic Echo Complete W/ Cont if Necessary Per Protocol    Cardiac Event Monitor   8. Chest wall pain  isosorbide mononitrate (IMDUR) 60 MG 24 hr tablet   Patient does have a history of ischemic cardiomyopathy with coronary artery bypass grafting in the past.  He is continue to be symptomatic with chest pain on exertion and shortness of breath and even syncope and collapse at times.  Therefore I would like to repeat an echocardiogram to evaluate his ejection fraction which was deemed to be 30 to 35% on the left heart catheterization.  At that time he is placed on Entresto he is already on beta-blocker therapy with metoprolol 50 mg daily.  2.  Due to patient's syncope and collapse I do feel it is appropriate do a carotid duplex as well as his persistent dizziness.  Since he is having similar symptoms prior to his blood pressure being low.  3.  I also like to do a cardiac event monitor due to the syncope and collapse to see if patient is having ectopy or dysrhythmia in which she is high risk of sudden cardiac death due to an ejection fraction of 30 to 35%.  4.  Due to patient's hypotension today we will decrease his isosorbide to 60 mg.  We will follow him up in 1 week for blood pressure check and symptom check.  We may have to discontinue the Entresto and increase the isosorbide back to regular dosing.  At 120 mg daily.  5.  Informed of signs and symptoms of ACS and advised to seek emergent treatment for any new worsening  symptoms.  Patient also advised sooner follow-up as needed.  Also advised to follow-up with family doctor as needed  This note is dictated utilizing voice recognition software.  Although this record has been proof read, transcriptional errors may still be present. If questions occur regarding the content of this record please do not hesitate to call our office.  I have reviewed and confirmed the accuracy of the ROS as documented by the MA/LPN/RN Anderson Palmer APRN    Return in about 4 weeks (around 12/21/2020), or if symptoms worsen or fail to improve, for 1 week for bp and symprtom check.    Diagnoses and all orders for this visit:    1. Ischemic cardiomyopathy (Primary)  -     ECG 12 Lead  -     Adult Transthoracic Echo Complete W/ Cont if Necessary Per Protocol; Future    2. Shortness of breath  -     ECG 12 Lead  -     Adult Transthoracic Echo Complete W/ Cont if Necessary Per Protocol; Future    3. Precordial pain  -     Adult Transthoracic Echo Complete W/ Cont if Necessary Per Protocol; Future    4. Cardiac LV ejection fraction 30-35%  -     Adult Transthoracic Echo Complete W/ Cont if Necessary Per Protocol; Future    5. Dizziness  -     Duplex Carotid Ultrasound CAR; Future  -     Adult Transthoracic Echo Complete W/ Cont if Necessary Per Protocol; Future    6. Syncope and collapse  -     Duplex Carotid Ultrasound CAR; Future  -     Adult Transthoracic Echo Complete W/ Cont if Necessary Per Protocol; Future    7. Palpitations  -     Cancel: Cardiac Event Monitor; Future  -     Adult Transthoracic Echo Complete W/ Cont if Necessary Per Protocol; Future  -     Cardiac Event Monitor; Future    8. Chest wall pain  -     isosorbide mononitrate (IMDUR) 60 MG 24 hr tablet; Take 1 tablet by mouth Every Morning.  Dispense: 30 tablet; Refill: 11        Navi Hanley  reports that he has been smoking cigarettes. He has a 30.00 pack-year smoking history. He has quit using smokeless tobacco.. I have educated him on  the risk of diseases from using tobacco products such as cancer, COPD and heart disease.     I advised him to quit and he is not willing to quit.    I spent 3  minutes counseling the patient.           Patient's Body mass index is 31.9 kg/m². BMI is above normal parameters. Recommendations include: educational material.           MEDS ORDERED DURING VISIT:  New Medications Ordered This Visit   Medications   • isosorbide mononitrate (IMDUR) 60 MG 24 hr tablet     Sig: Take 1 tablet by mouth Every Morning.     Dispense:  30 tablet     Refill:  11           This document has been electronically signed by Anderson Palmer Jr., APRN  November 23, 2020 09:06 EST

## 2020-11-23 NOTE — PATIENT INSTRUCTIONS
Steps to Quit Smoking  Smoking tobacco is the leading cause of preventable death. It can affect almost every organ in the body. Smoking puts you and people around you at risk for many serious, long-lasting (chronic) diseases. Quitting smoking can be hard, but it is one of the best things that you can do for your health. It is never too late to quit.  How do I get ready to quit?  When you decide to quit smoking, make a plan to help you succeed. Before you quit:  · Pick a date to quit. Set a date within the next 2 weeks to give you time to prepare.  · Write down the reasons why you are quitting. Keep this list in places where you will see it often.  · Tell your family, friends, and co-workers that you are quitting. Their support is important.  · Talk with your doctor about the choices that may help you quit.  · Find out if your health insurance will pay for these treatments.  · Know the people, places, things, and activities that make you want to smoke (triggers). Avoid them.  What first steps can I take to quit smoking?  · Throw away all cigarettes at home, at work, and in your car.  · Throw away the things that you use when you smoke, such as ashtrays and lighters.  · Clean your car. Make sure to empty the ashtray.  · Clean your home, including curtains and carpets.  What can I do to help me quit smoking?  Talk with your doctor about taking medicines and seeing a counselor at the same time. You are more likely to succeed when you do both.  · If you are pregnant or breastfeeding, talk with your doctor about counseling or other ways to quit smoking. Do not take medicine to help you quit smoking unless your doctor tells you to do so.  To quit smoking:  Quit right away  · Quit smoking totally, instead of slowly cutting back on how much you smoke over a period of time.  · Go to counseling. You are more likely to quit if you go to counseling sessions regularly.  Take medicine  You may take medicines to help you quit. Some  medicines need a prescription, and some you can buy over-the-counter. Some medicines may contain a drug called nicotine to replace the nicotine in cigarettes. Medicines may:  · Help you to stop having the desire to smoke (cravings).  · Help to stop the problems that come when you stop smoking (withdrawal symptoms).  Your doctor may ask you to use:  · Nicotine patches, gum, or lozenges.  · Nicotine inhalers or sprays.  · Non-nicotine medicine that is taken by mouth.  Find resources  Find resources and other ways to help you quit smoking and remain smoke-free after you quit. These resources are most helpful when you use them often. They include:  · Online chats with a counselor.  · Phone quitlines.  · Printed self-help materials.  · Support groups or group counseling.  · Text messaging programs.  · Mobile phone apps. Use apps on your mobile phone or tablet that can help you stick to your quit plan. There are many free apps for mobile phones and tablets as well as websites. Examples include Quit Guide from the CDC and smokefree.gov    What things can I do to make it easier to quit?    · Talk to your family and friends. Ask them to support and encourage you.  · Call a phone quitline (9-582-QUITNOW), reach out to support groups, or work with a counselor.  · Ask people who smoke to not smoke around you.  · Avoid places that make you want to smoke, such as:  ? Bars.  ? Parties.  ? Smoke-break areas at work.  · Spend time with people who do not smoke.  · Lower the stress in your life. Stress can make you want to smoke. Try these things to help your stress:  ? Getting regular exercise.  ? Doing deep-breathing exercises.  ? Doing yoga.  ? Meditating.  ? Doing a body scan. To do this, close your eyes, focus on one area of your body at a time from head to toe. Notice which parts of your body are tense. Try to relax the muscles in those areas.  How will I feel when I quit smoking?  Day 1 to 3 weeks  Within the first 24 hours,  you may start to have some problems that come from quitting tobacco. These problems are very bad 2-3 days after you quit, but they do not often last for more than 2-3 weeks. You may get these symptoms:  · Mood swings.  · Feeling restless, nervous, angry, or annoyed.  · Trouble concentrating.  · Dizziness.  · Strong desire for high-sugar foods and nicotine.  · Weight gain.  · Trouble pooping (constipation).  · Feeling like you may vomit (nausea).  · Coughing or a sore throat.  · Changes in how the medicines that you take for other issues work in your body.  · Depression.  · Trouble sleeping (insomnia).  Week 3 and afterward  After the first 2-3 weeks of quitting, you may start to notice more positive results, such as:  · Better sense of smell and taste.  · Less coughing and sore throat.  · Slower heart rate.  · Lower blood pressure.  · Clearer skin.  · Better breathing.  · Fewer sick days.  Quitting smoking can be hard. Do not give up if you fail the first time. Some people need to try a few times before they succeed. Do your best to stick to your quit plan, and talk with your doctor if you have any questions or concerns.  Summary  · Smoking tobacco is the leading cause of preventable death. Quitting smoking can be hard, but it is one of the best things that you can do for your health.  · When you decide to quit smoking, make a plan to help you succeed.  · Quit smoking right away, not slowly over a period of time.  · When you start quitting, seek help from your doctor, family, or friends.  This information is not intended to replace advice given to you by your health care provider. Make sure you discuss any questions you have with your health care provider.  Document Released: 10/14/2010 Document Revised: 09/11/2020 Document Reviewed: 03/07/2020  Elsevier Patient Education © 2020 [employer]  BMI for Adults  What is BMI?  Body mass index (BMI) is a number that is calculated from a person's weight and height. BMI  "can help estimate how much of a person's weight is composed of fat. BMI does not measure body fat directly. Rather, it is an alternative to procedures that directly measure body fat, which can be difficult and expensive.  BMI can help identify people who may be at higher risk for certain medical problems.  What are BMI measurements used for?  BMI is used as a screening tool to identify possible weight problems. It helps determine whether a person is obese, overweight, a healthy weight, or underweight.  BMI is useful for:  · Identifying a weight problem that may be related to a medical condition or may increase the risk for medical problems.  · Promoting changes, such as changes in diet and exercise, to help reach a healthy weight. BMI screening can be repeated to see if these changes are working.  How is BMI calculated?  BMI involves measuring your weight in relation to your height. Both height and weight are measured, and the BMI is calculated from those numbers. This can be done either in English ([place] or metric measurements. Note that charts and online BMI calculators are available to help you find your BMI quickly and easily without having to do these calculations yourself.  To calculate your BMI in English ([place] measurements:    1. Measure your weight in pounds (lb).  2. Multiply the number of pounds by 703.  ? For example, for a person who weighs 180 lb, multiply that number by 703, which equals 126,540.  3. Measure your height in inches. Then multiply that number by itself to get a measurement called \"inches squared.\"  ? For example, for a person who is 70 inches tall, the \"inches squared\" measurement is 70 inches x 70 inches, which equals 4,900 inches squared.  4. Divide the total from step 2 (number of lb x 703) by the total from step 3 (inches squared): 126,540 ÷ 4,900 = 25.8. This is your BMI.  To calculate your BMI in metric measurements:  1. Measure your weight in kilograms (kg).  2. Measure your " "height in meters (m). Then multiply that number by itself to get a measurement called \"meters squared.\"  ? For example, for a person who is 1.75 m tall, the \"meters squared\" measurement is 1.75 m x 1.75 m, which is equal to 3.1 meters squared.  3. Divide the number of kilograms (your weight) by the meters squared number. In this example: 70 ÷ 3.1 = 22.6. This is your BMI.  What do the results mean?  BMI charts are used to identify whether you are underweight, normal weight, overweight, or obese. The following guidelines will be used:  · Underweight: BMI less than 18.5.  · Normal weight: BMI between 18.5 and 24.9.  · Overweight: BMI between 25 and 29.9.  · Obese: BMI of 30 or above.  Keep these notes in mind:  · Weight includes both fat and muscle, so someone with a muscular build, such as an athlete, may have a BMI that is higher than 24.9. In cases like these, BMI is not an accurate measure of body fat.  · To determine if excess body fat is the cause of a BMI of 25 or higher, further assessments may need to be done by a health care provider.  · BMI is usually interpreted in the same way for men and women.  Where to find more information  For more information about BMI, including tools to quickly calculate your BMI, go to these websites:  · Centers for Disease Control and Prevention: www.cdc.gov  · American Heart Association: www.heart.org  · National Heart, Lung, and Blood [place]: www.nhlbi.nih.gov  Summary  · Body mass index (BMI) is a number that is calculated from a person's weight and height.  · BMI may help estimate how much of a person's weight is composed of fat. BMI can help identify those who may be at higher risk for certain medical problems.  · BMI can be measured using English measurements or metric measurements.  · BMI charts are used to identify whether you are underweight, normal weight, overweight, or obese.  This information is not intended to replace advice given to you by your health care " provider. Make sure you discuss any questions you have with your health care provider.  Document Released: 08/29/2005 Document Revised: 09/09/2020 Document Reviewed: 07/17/2020  [employer] Patient Education © 2020 [employer]    Acute Coronary Syndrome  Acute coronary syndrome (ACS) is a serious problem in which there is suddenly not enough blood and oxygen reaching the heart. ACS can result in chest pain or a heart attack.  This condition is a medical emergency. If you have any symptoms of this condition, get help right away.  What are the causes?  This condition may be caused by:  · A buildup of fat and cholesterol inside the arteries (atherosclerosis). This is the most common cause. The buildup (plaque) can cause blood vessels in the heart (coronary arteries) to become narrow or blocked, which reduces blood flow to the heart. Plaque can also break off and lead to a clot, which can block an artery and cause a heart attack or stroke.  · Sudden tightening of the muscles around the coronary arteries (coronary spasm).  · Tearing of a coronary artery (spontaneous coronary artery dissection).  · Very low blood pressure (hypotension).  · An abnormal heartbeat (arrhythmia).  · Other medical conditions that cause a decrease of oxygen to the heart, such as anemiaorrespiratory failure.  · Using cocaine or methamphetamine.  What increases the risk?  The following factors may make you more likely to develop this condition:  · Age. The risk for ACS increases as you get older.  · History of chest pain, heart attack, peripheral artery disease, or stroke.  · Having taken chemotherapy or immune-suppressing medicines.  · Being male.  · Family history of chest pain, heart disease, or stroke.  · Smoking.  · Not exercising enough.  · Being overweight.  · High cholesterol.  · High blood pressure (hypertension).  · Diabetes.  · Excessive alcohol use.  What are the signs or symptoms?  Common symptoms of this condition include:  · Chest  pain. The pain may last a long time, or it may stop and come back (recur). It may feel like:  ? Crushing or squeezing.  ? Tightness, pressure, fullness, or heaviness.  · Arm, neck, jaw, or back pain.  · Heartburn or indigestion.  · Shortness of breath.  · Nausea.  · Sudden cold sweats.  · Light-headedness.  · Dizziness or passing out.  · Tiredness (fatigue).  Sometimes there are no symptoms.  How is this diagnosed?  This condition may be diagnosed based on:  · Your medical history and symptoms.  · Imaging tests, such as:  ? An electrocardiogram (ECG). This measures the heart's electrical activity.  ? X-rays.  ? CT scan.  ? A coronary angiogram. For this test, dye is injected into the heart arteries and then X-rays are taken.  ? Myocardial perfusion imaging. This test shows how well blood flows through your heart muscle.  · Blood tests. These may be repeated at certain time intervals.  · Exercise stress testing.  · Echocardiogram. This is a test that uses sound waves to produce detailed images of the heart.  How is this treated?  Treatment for this condition may include:  · Oxygen therapy.  · Medicines, such as:  ? Antiplatelet medicines and blood-thinning medicines, such as aspirin. These help prevent blood clots.  ? Medicine that dissolves any blood clots (fibrinolytic therapy).  ? Blood pressure medicines.  ? Nitroglycerin. This helps widen blood vessels to improve blood flow.  ? Pain medicine.  ? Cholesterol-lowering medicine.  · Surgery, such as:  ? Coronary angioplasty with stent placement. This involves placing a small piece of metal that looks like mesh or a spring into a narrow coronary artery. This widens the artery and keeps it open.  ? Coronary artery bypass surgery. This involves taking a section of a blood vessel from a different part of your body and placing it on the blocked coronary artery to allow blood to flow around the blockage.  · Cardiac rehabilitation. This is a program that includes exercise  training, education, and counseling to help you recover.  Follow these instructions at home:  Eating and drinking  · Eat a heart-healthy diet that includes whole grains, fruits and vegetables, lean proteins, and low-fat or nonfat dairy products.  · Limit how much salt (sodium) you eat as told by your health care provider. Follow instructions from your health care provider about any other eating or drinking restrictions, such as limiting foods that are high in fat and processed sugars.  · Use healthy cooking methods such as roasting, grilling, broiling, baking, poaching, steaming, or stir-frying.  · Work with a dietitian to follow a heart-healthy eating plan.  Medicines  · Take over-the-counter and prescription medicines only as told by your health care provider.  · Do not take these medicines unless your health care provider approves:  ? Vitamin supplements that contain vitamin A or vitamin E.  ? NSAIDs, such as ibuprofen, naproxen, or celecoxib.  ? Hormone replacement therapy that contains estrogen.  · If you are taking blood thinners:  ? Talk with your health care provider before you take any medicines that contain aspirin or NSAIDs. These medicines increase your risk for dangerous bleeding.  ? Take your medicine exactly as told, at the same time every day.  ? Avoid activities that could cause injury or bruising, and follow instructions about how to prevent falls.  ? Wear a medical alert bracelet, and carry a card that lists what medicines you take.  Activity  · Follow your cardiac rehabilitation program. Do exercises as told by your physical therapist.  · Ask your health care provider what activities and exercises are safe for you. Follow his or her instructions about lifting, driving, or climbing stairs.  Lifestyle  · Do not use any products that contain nicotine or tobacco, such as cigarettes, e-cigarettes, and chewing tobacco. If you need help quitting, ask your health care provider.  · Do not drink alcohol if  your health care provider tells you not to drink.  · If you drink alcohol:  ? Limit how much you have to 0-1 drink a day.  ? Be aware of how much alcohol is in your drink. In the [place] one drink equals one 12 oz bottle of beer (355 mL), one 5 oz glass of wine (148 mL), or one 1½ oz glass of hard liquor (44 mL).  · Maintain a healthy weight. If you need to lose weight, work with your health care provider to do so safely.  General instructions  · Tell all the health care providers who provide care for you about your heart condition, including your dentist. This may affect the medicines or treatment you receive.  · Manage any other health conditions you have, such as hypertension or diabetes. These conditions affect your heart.  · Pay attention to your mental health. You may be at higher risk for depression.  ? Find ways to manage stress.  ? Talk to your health care provider about depression screening and treatment.  · Keep your vaccinations up to date.  ? Get the flu shot (influenza vaccine) every year.  ? Get the pneumococcal vaccine if you are age 65 or older.  · If directed, monitor your blood pressure at home.  · Keep all follow-up visits as told by your health care provider. This is important.  Contact a health care provider if you:  · Feel overwhelmed or sad.  · Have trouble doing your daily activities.  Get help right away if you:  · Have pain in your chest, neck, arm, jaw, stomach, or back that recurs, and:  ? It lasts for more than a few minutes.  ? It is not relieved by taking the medicineyour health care provider prescribed.  · Have unexplained:  ? Heavy sweating.  ? Heartburn or indigestion.  ? Nausea or vomiting.  ? Shortness of breath.  ? Difficulty breathing.  ? Fatigue.  ? Nervousness or anxiety.  ? Weakness.  ? Diarrhea.  ? Dark stools or blood in your stool.  · Have sudden light-headedness or dizziness.  · Have blood pressure that is higher than 180/120.  · Faint.  · Have thoughts about hurting  yourself.  These symptoms may represent a serious problem that is an emergency. Do not wait to see if the symptoms will go away. Get medical help right away. Call your local emergency services (911 in the [place] Do not drive yourself to the hospital.   Summary  · Acute coronary syndrome (ACS) is when there is not enough blood and oxygen being supplied to the heart. ACS can result in chest pain or a heart attack.  · Acute coronary syndrome is a medical emergency. If you have any symptoms of this condition, get help right away.  · Treatment includes medicines and procedures to open the blocked arteries and restore blood flow.  This information is not intended to replace advice given to you by your health care provider. Make sure you discuss any questions you have with your health care provider.  Document Released: 12/18/2006 Document Revised: 05/20/2020 Document Reviewed: 12/30/2019  Elsevier Patient Education © 2020 [employer] Inc.

## 2020-11-30 ENCOUNTER — CLINICAL SUPPORT (OUTPATIENT)
Dept: CARDIOLOGY | Facility: CLINIC | Age: 45
End: 2020-11-30

## 2020-11-30 VITALS
DIASTOLIC BLOOD PRESSURE: 77 MMHG | TEMPERATURE: 97.3 F | WEIGHT: 215 LBS | OXYGEN SATURATION: 97 % | HEIGHT: 69 IN | HEART RATE: 94 BPM | BODY MASS INDEX: 31.84 KG/M2 | SYSTOLIC BLOOD PRESSURE: 104 MMHG

## 2020-11-30 DIAGNOSIS — R94.39 POSITIVE CARDIAC STRESS TEST: ICD-10-CM

## 2020-11-30 DIAGNOSIS — I25.10 CORONARY ARTERY DISEASE INVOLVING NATIVE CORONARY ARTERY OF NATIVE HEART WITHOUT ANGINA PECTORIS: ICD-10-CM

## 2020-11-30 DIAGNOSIS — R07.2 PRECORDIAL PAIN: ICD-10-CM

## 2020-11-30 DIAGNOSIS — R42 DIZZINESS: ICD-10-CM

## 2020-11-30 DIAGNOSIS — R06.02 SHORTNESS OF BREATH: ICD-10-CM

## 2020-11-30 DIAGNOSIS — I25.5 ISCHEMIC CARDIOMYOPATHY: ICD-10-CM

## 2020-11-30 DIAGNOSIS — R00.2 PALPITATIONS: Primary | ICD-10-CM

## 2020-11-30 PROCEDURE — 99211 OFF/OP EST MAY X REQ PHY/QHP: CPT | Performed by: NURSE PRACTITIONER

## 2020-11-30 RX ORDER — RANOLAZINE 500 MG/1
500 TABLET, EXTENDED RELEASE ORAL 2 TIMES DAILY
Qty: 60 TABLET | Refills: 11 | Status: SHIPPED | OUTPATIENT
Start: 2020-11-30 | End: 2021-12-23

## 2020-11-30 RX ORDER — METOPROLOL SUCCINATE 100 MG/1
100 TABLET, EXTENDED RELEASE ORAL DAILY
Qty: 30 TABLET | Refills: 11 | Status: SHIPPED | OUTPATIENT
Start: 2020-11-30 | End: 2021-06-24

## 2020-11-30 NOTE — PROGRESS NOTES
Navi Hanley  1975 11/30/2020   ?   Chief Complaint   Patient presents with   • Nurse visit     Blood pressure and symptom check      ?   HPI:   ? Patient seen today for blood pressure and symptom check. Patient reports Saturday, he fell back, chest pounding, felt heart skipping. Stated finally eased off and chest stopped pounding.   ?   ?     Current Outpatient Medications:   •  amLODIPine (NORVASC) 2.5 MG tablet, Take 1 tablet by mouth Daily., Disp: 30 tablet, Rfl: 11  •  ASPIRIN LOW DOSE 81 MG EC tablet, Take 81 mg by mouth Daily., Disp: , Rfl:   •  atorvastatin (LIPITOR) 80 MG tablet, Take 80 mg by mouth Daily., Disp: , Rfl:   •  cetirizine (zyrTEC) 10 MG tablet, Take 10 mg by mouth Daily., Disp: , Rfl:   •  clopidogrel (PLAVIX) 75 MG tablet, TAKE 1 TABLET BY MOUTH ONCE DAILY, Disp: 30 tablet, Rfl: 10  •  cyclobenzaprine (FLEXERIL) 10 MG tablet, TAKE 1 TABLET BY MOUTH TWO TIMES A DAY, Disp: 60 tablet, Rfl: 2  •  furosemide (LASIX) 20 MG tablet, Take 20 mg by mouth Daily., Disp: , Rfl:   •  isosorbide mononitrate (IMDUR) 60 MG 24 hr tablet, Take 1 tablet by mouth Every Morning., Disp: 30 tablet, Rfl: 11  •  MAPAP 325 MG tablet, 325 mg As Needed., Disp: , Rfl:   •  metoprolol succinate XL (TOPROL-XL) 50 MG 24 hr tablet, Take 1 tablet by mouth Daily., Disp: 60 tablet, Rfl: 11  •  naproxen (NAPROSYN) 500 MG tablet, TAKE 1 TABLET BY MOUTH TWO TIMES A DAY WITH MEALS, Disp: 60 tablet, Rfl: 0  •  nitroglycerin (NITROSTAT) 0.4 MG SL tablet, 1 under the tongue as needed for angina, may repeat q5mins for up three doses, Disp: 100 tablet, Rfl: 11  •  potassium chloride (K-DUR) 10 MEQ CR tablet, 2 (Two) Times a Day., Disp: , Rfl:   •  ranolazine (Ranexa) 1000 MG 12 hr tablet, Take 1 tablet by mouth Every 12 (Twelve) Hours., Disp: 60 tablet, Rfl: 12  •  sacubitril-valsartan (Entresto) 24-26 MG tablet, Take 1 tablet by mouth 2 (Two) Times a Day., Disp: 60 tablet, Rfl: 11  •  SENNA PLUS 8.6-50 MG per tablet, 2 (Two)  Times a Day., Disp: , Rfl:    ?   ?   Patient has no known allergies.       Procedures     ?   Assessment/Plan    ?   ?   Mr. Hanley was reevaluated to his nurses in which I stepped into the room with him and had any discussion.  He said on Saturday had a pretty rough day where he got dizzy fell back his chest was pounding and skipping beats and he was unable to get his nitroglycerin and the pain finally relieved away on its own.  He did say that he had fewer dizzy episodes this past week after decreasing the isosorbide.  I will also decrease his Ranexa today and try to increase his metoprolol to 100 mg daily.  He is notify me if his blood pressure continues to be low we may have to discontinue other medicines.  We are going to try to maintain the Entresto and metoprolol as the most important agents is due to his systolic heart failure.  Next we will discontinue the amlodipine if his blood pressure continues to be low.  ?

## 2020-12-15 DIAGNOSIS — R07.2 PRECORDIAL PAIN: ICD-10-CM

## 2020-12-16 RX ORDER — AMLODIPINE BESYLATE 2.5 MG/1
TABLET ORAL
Qty: 30 TABLET | Refills: 10 | Status: SHIPPED | OUTPATIENT
Start: 2020-12-16 | End: 2021-02-24

## 2021-01-04 ENCOUNTER — HOSPITAL ENCOUNTER (OUTPATIENT)
Dept: CARDIOLOGY | Facility: HOSPITAL | Age: 46
Discharge: HOME | End: 2021-01-04

## 2021-01-04 VITALS — WEIGHT: 214.95 LBS | HEIGHT: 69 IN | BODY MASS INDEX: 31.84 KG/M2

## 2021-01-04 DIAGNOSIS — I25.5 ISCHEMIC CARDIOMYOPATHY: ICD-10-CM

## 2021-01-04 DIAGNOSIS — R55 SYNCOPE AND COLLAPSE: ICD-10-CM

## 2021-01-04 DIAGNOSIS — R00.2 PALPITATIONS: ICD-10-CM

## 2021-01-04 DIAGNOSIS — R94.30 CARDIAC LV EJECTION FRACTION 30-35%: ICD-10-CM

## 2021-01-04 DIAGNOSIS — R42 DIZZINESS: ICD-10-CM

## 2021-01-04 DIAGNOSIS — R06.02 SHORTNESS OF BREATH: ICD-10-CM

## 2021-01-04 DIAGNOSIS — R07.2 PRECORDIAL PAIN: ICD-10-CM

## 2021-01-04 PROCEDURE — 93880 EXTRACRANIAL BILAT STUDY: CPT

## 2021-01-04 PROCEDURE — 93880 EXTRACRANIAL BILAT STUDY: CPT | Performed by: INTERNAL MEDICINE

## 2021-01-05 LAB
AORTIC DIMENSIONLESS INDEX: 0.7 (DI)
BH CV ECHO MEAS - ACS: 1.7 CM
BH CV ECHO MEAS - AO MAX PG (FULL): 2.2 MMHG
BH CV ECHO MEAS - AO MAX PG: 4.4 MMHG
BH CV ECHO MEAS - AO MEAN PG (FULL): 1 MMHG
BH CV ECHO MEAS - AO MEAN PG: 2 MMHG
BH CV ECHO MEAS - AO ROOT AREA (BSA CORRECTED): 1.3
BH CV ECHO MEAS - AO ROOT AREA: 6.2 CM^2
BH CV ECHO MEAS - AO ROOT DIAM: 2.8 CM
BH CV ECHO MEAS - AO V2 MAX: 105 CM/SEC
BH CV ECHO MEAS - AO V2 MEAN: 62.3 CM/SEC
BH CV ECHO MEAS - AO V2 VTI: 17.6 CM
BH CV ECHO MEAS - BSA(HAYCOCK): 2.2 M^2
BH CV ECHO MEAS - BSA: 2.1 M^2
BH CV ECHO MEAS - BZI_BMI: 31.8 KILOGRAMS/M^2
BH CV ECHO MEAS - BZI_METRIC_HEIGHT: 175.3 CM
BH CV ECHO MEAS - BZI_METRIC_WEIGHT: 97.5 KG
BH CV ECHO MEAS - EDV(CUBED): 127.3 ML
BH CV ECHO MEAS - EDV(TEICH): 119.9 ML
BH CV ECHO MEAS - EF(CUBED): 62.1 %
BH CV ECHO MEAS - EF(TEICH): 53.4 %
BH CV ECHO MEAS - EF_3D-VOL: 55 %
BH CV ECHO MEAS - ESV(CUBED): 48.2 ML
BH CV ECHO MEAS - ESV(TEICH): 55.9 ML
BH CV ECHO MEAS - FS: 27.6 %
BH CV ECHO MEAS - IVS/LVPW: 0.92
BH CV ECHO MEAS - IVSD: 1.1 CM
BH CV ECHO MEAS - LA DIMENSION: 3.5 CM
BH CV ECHO MEAS - LA/AO: 1.3
BH CV ECHO MEAS - LAT PEAK E' VEL: 10.9 CM/SEC
BH CV ECHO MEAS - LV IVRT: 0.11 SEC
BH CV ECHO MEAS - LV MASS(C)D: 207.9 GRAMS
BH CV ECHO MEAS - LV MASS(C)DI: 97.6 GRAMS/M^2
BH CV ECHO MEAS - LV MAX PG: 2.2 MMHG
BH CV ECHO MEAS - LV MEAN PG: 1 MMHG
BH CV ECHO MEAS - LV V1 MAX: 74.4 CM/SEC
BH CV ECHO MEAS - LV V1 MEAN: 39.1 CM/SEC
BH CV ECHO MEAS - LV V1 VTI: 11.4 CM
BH CV ECHO MEAS - LVIDD: 5 CM
BH CV ECHO MEAS - LVIDS: 3.6 CM
BH CV ECHO MEAS - LVPWD: 1.1 CM
BH CV ECHO MEAS - MED PEAK E' VEL: 6 CM/SEC
BH CV ECHO MEAS - MV A MAX VEL: 57.3 CM/SEC
BH CV ECHO MEAS - MV DEC SLOPE: 339 CM/SEC^2
BH CV ECHO MEAS - MV DEC TIME: 0.2 SEC
BH CV ECHO MEAS - MV E MAX VEL: 48.8 CM/SEC
BH CV ECHO MEAS - MV E/A: 0.85
BH CV ECHO MEAS - MV MAX PG: 2.1 MMHG
BH CV ECHO MEAS - MV MEAN PG: 1 MMHG
BH CV ECHO MEAS - MV P1/2T MAX VEL: 72.5 CM/SEC
BH CV ECHO MEAS - MV P1/2T: 62.6 MSEC
BH CV ECHO MEAS - MV V2 MAX: 73.2 CM/SEC
BH CV ECHO MEAS - MV V2 MEAN: 46.4 CM/SEC
BH CV ECHO MEAS - MV V2 VTI: 23.9 CM
BH CV ECHO MEAS - MVA P1/2T LCG: 3 CM^2
BH CV ECHO MEAS - MVA(P1/2T): 3.5 CM^2
BH CV ECHO MEAS - PA MAX PG (FULL): 2 MMHG
BH CV ECHO MEAS - PA MAX PG: 3.8 MMHG
BH CV ECHO MEAS - PA MEAN PG (FULL): 1 MMHG
BH CV ECHO MEAS - PA MEAN PG: 2 MMHG
BH CV ECHO MEAS - PA V2 MAX: 97.6 CM/SEC
BH CV ECHO MEAS - PA V2 MEAN: 60.9 CM/SEC
BH CV ECHO MEAS - PA V2 VTI: 19.1 CM
BH CV ECHO MEAS - RV MAX PG: 1.8 MMHG
BH CV ECHO MEAS - RV MEAN PG: 1 MMHG
BH CV ECHO MEAS - RV V1 MAX: 67.2 CM/SEC
BH CV ECHO MEAS - RV V1 MEAN: 39.4 CM/SEC
BH CV ECHO MEAS - RV V1 VTI: 12.3 CM
BH CV ECHO MEAS - RVDD: 2.5 CM
BH CV ECHO MEAS - SI(AO): 50.9 ML/M^2
BH CV ECHO MEAS - SI(CUBED): 37.1 ML/M^2
BH CV ECHO MEAS - SI(TEICH): 30 ML/M^2
BH CV ECHO MEAS - SV(AO): 108.4 ML
BH CV ECHO MEAS - SV(CUBED): 79 ML
BH CV ECHO MEAS - SV(TEICH): 64 ML
BH CV ECHO MEASUREMENTS AVERAGE E/E' RATIO: 5.78

## 2021-01-06 ENCOUNTER — TELEPHONE (OUTPATIENT)
Dept: CARDIOLOGY | Facility: CLINIC | Age: 46
End: 2021-01-06

## 2021-01-06 NOTE — TELEPHONE ENCOUNTER
Left message for patient to call office, regarding echo results. Tamika Abdul LPN    ----- Message from SAIGE Lynn sent at 1/6/2021  3:09 PM EST -----  Ejection fraction up to 45 to 50% keep follow-up.  ----- Message -----  From: Navi Aguirre MD  Sent: 1/5/2021  11:07 PM EST  To: SAIGE Lynn    Result Text     Moderately technically limited study.     1.  LV size is at the upper limits of normal to mildly decreased and global LV systolic function appears to be in the low normal range with a visually estimated ejection fraction of 45 to 50%.  Three-dimensional EF is measured at 55%.  Significantly abnormal global longitudinal strain of -9.3% is likely factitiously low although I believe there is some degree of decreased left ventricular mechanical performance present.  The left atrium is at the upper limits of normal size.  Right heart chambers are grossly normal.     2.  Valves are morphologically and physiologically normal with no stenotic lesions, valve associated masses or thrombi, or hemodynamically significant regurgitation.     3.  No pericardial or great vessel pathology.     4.  Pulmonary artery pressures cannot be calculated.

## 2021-01-08 ENCOUNTER — TELEPHONE (OUTPATIENT)
Dept: CARDIOLOGY | Facility: CLINIC | Age: 46
End: 2021-01-08

## 2021-01-08 NOTE — TELEPHONE ENCOUNTER
Patient informed of echo results and reminded of follow up appointment. Patient verbalized understanding. Tamika Abdul LPN    ----- Message from SAIGE Lynn sent at 1/6/2021  3:09 PM EST -----  Ejection fraction up to 45 to 50% keep follow-up.  ----- Message -----  From: Navi Aguirre MD  Sent: 1/5/2021  11:07 PM EST  To: SAIGE Lynn

## 2021-01-13 LAB
BH CV ECHO MEAS - BSA(HAYCOCK): 2.2 M^2
BH CV ECHO MEAS - BSA: 2.1 M^2
BH CV ECHO MEAS - BZI_BMI: 31.5 KILOGRAMS/M^2
BH CV ECHO MEAS - BZI_METRIC_HEIGHT: 175.3 CM
BH CV ECHO MEAS - BZI_METRIC_WEIGHT: 96.6 KG
BH CV XLRA MEAS LEFT BULB EDV: -22.7 CM/SEC
BH CV XLRA MEAS LEFT BULB PSV: -55.4 CM/SEC
BH CV XLRA MEAS LEFT CCA RATIO VEL: -56.3 CM/SEC
BH CV XLRA MEAS LEFT DIST CCA EDV: -21.4 CM/SEC
BH CV XLRA MEAS LEFT DIST CCA PSV: -56.8 CM/SEC
BH CV XLRA MEAS LEFT DIST ICA EDV: -43.2 CM/SEC
BH CV XLRA MEAS LEFT DIST ICA PSV: -113.4 CM/SEC
BH CV XLRA MEAS LEFT ICA RATIO VEL: -113 CM/SEC
BH CV XLRA MEAS LEFT ICA/CCA RATIO: 2
BH CV XLRA MEAS LEFT MID ICA EDV: -39.8 CM/SEC
BH CV XLRA MEAS LEFT MID ICA PSV: -92.8 CM/SEC
BH CV XLRA MEAS LEFT PROX CCA EDV: 23.3 CM/SEC
BH CV XLRA MEAS LEFT PROX CCA PSV: 103.1 CM/SEC
BH CV XLRA MEAS LEFT PROX ECA EDV: -11.6 CM/SEC
BH CV XLRA MEAS LEFT PROX ECA PSV: -61.3 CM/SEC
BH CV XLRA MEAS LEFT PROX ICA EDV: -41.7 CM/SEC
BH CV XLRA MEAS LEFT PROX ICA PSV: -94.3 CM/SEC
BH CV XLRA MEAS LEFT VERTEBRAL A EDV: 17.1 CM/SEC
BH CV XLRA MEAS LEFT VERTEBRAL A PSV: 40.2 CM/SEC
BH CV XLRA MEAS RIGHT BULB EDV: -16.1 CM/SEC
BH CV XLRA MEAS RIGHT BULB PSV: -39.7 CM/SEC
BH CV XLRA MEAS RIGHT CCA RATIO VEL: -58.1 CM/SEC
BH CV XLRA MEAS RIGHT DIST CCA EDV: -20.4 CM/SEC
BH CV XLRA MEAS RIGHT DIST CCA PSV: -58.5 CM/SEC
BH CV XLRA MEAS RIGHT DIST ICA EDV: -33.9 CM/SEC
BH CV XLRA MEAS RIGHT DIST ICA PSV: -64.2 CM/SEC
BH CV XLRA MEAS RIGHT ICA RATIO VEL: -63.9 CM/SEC
BH CV XLRA MEAS RIGHT ICA/CCA RATIO: 1.1
BH CV XLRA MEAS RIGHT MID ICA EDV: -22.7 CM/SEC
BH CV XLRA MEAS RIGHT MID ICA PSV: -49.6 CM/SEC
BH CV XLRA MEAS RIGHT PROX CCA EDV: 13.2 CM/SEC
BH CV XLRA MEAS RIGHT PROX CCA PSV: 78.3 CM/SEC
BH CV XLRA MEAS RIGHT PROX ECA EDV: -18.1 CM/SEC
BH CV XLRA MEAS RIGHT PROX ECA PSV: -80.5 CM/SEC
BH CV XLRA MEAS RIGHT PROX ICA EDV: -16.8 CM/SEC
BH CV XLRA MEAS RIGHT PROX ICA PSV: -38.8 CM/SEC
BH CV XLRA MEAS RIGHT VERTEBRAL A EDV: 9 CM/SEC
BH CV XLRA MEAS RIGHT VERTEBRAL A PSV: 22.4 CM/SEC

## 2021-01-15 ENCOUNTER — TELEPHONE (OUTPATIENT)
Dept: CARDIOLOGY | Facility: CLINIC | Age: 46
End: 2021-01-15

## 2021-01-15 NOTE — TELEPHONE ENCOUNTER
Spoke with patient, he is aware to keep his followup and that provider will go over results at followup appointment. -AS,PAC      From: Anderson Palmer APRN   Sent: 1/13/2021   1:51 PM EST   To: Tamika Abdul LPN     Nonobstructive carotid artery disease.  Keep follow-up.   ----- Message -----   From: Navi Aguirre MD   Sent: 1/13/2021   1:48 PM EST   To: SAIGE Lynn

## 2021-02-24 ENCOUNTER — OFFICE VISIT (OUTPATIENT)
Dept: CARDIOLOGY | Facility: CLINIC | Age: 46
End: 2021-02-24

## 2021-02-24 VITALS
SYSTOLIC BLOOD PRESSURE: 98 MMHG | DIASTOLIC BLOOD PRESSURE: 65 MMHG | BODY MASS INDEX: 32.38 KG/M2 | OXYGEN SATURATION: 98 % | HEART RATE: 102 BPM | WEIGHT: 218.6 LBS | HEIGHT: 69 IN

## 2021-02-24 DIAGNOSIS — R06.02 SHORTNESS OF BREATH: ICD-10-CM

## 2021-02-24 DIAGNOSIS — I25.5 ISCHEMIC CARDIOMYOPATHY: Primary | ICD-10-CM

## 2021-02-24 DIAGNOSIS — I25.10 CORONARY ARTERY DISEASE INVOLVING NATIVE CORONARY ARTERY OF NATIVE HEART WITHOUT ANGINA PECTORIS: ICD-10-CM

## 2021-02-24 DIAGNOSIS — I95.1 ORTHOSTATIC HYPOTENSION: ICD-10-CM

## 2021-02-24 PROCEDURE — 99214 OFFICE O/P EST MOD 30 MIN: CPT | Performed by: PHYSICIAN ASSISTANT

## 2021-02-24 NOTE — PROGRESS NOTES
Problem list     Subjective   Navi Hanley is a 46 y.o. male     Chief Complaint   Patient presents with   • Follow-up        Problem list  1.  Coronary artery disease  1.1 cardiac catheterization April 2019 demonstrated high-grade three-vessel disease with an EF of 30% with recommendations for coronary artery bypass grafting  1.2 coronary artery bypass grafting, three-vessel, April 2019 per patient report, inadequate data  1.3.  Stress test 7/20: Severely depressed post stress ejection fraction of 27% with global hypokinesis and basilar inferior lateral lateral akinesis positive scintigraphic the defect compatible with a relatively large posterior lateral infarct and mild to moderate pacheco-infarct ischemia  1.4 left heart catheterization 8/20: LIMA widely patent throughout its course touchdown to the middle third of the LAD with only minor irregularities past the touchdown, RCA was patent throughout its course diffusely diseased distal with segmental 80% narrowing proximal to the distal PDA, SVG was patent throughout its course it appeared to touchdown on a more lateral branch of the bifurcating diagonal.  The proximal portion of the diagonal connected to the smaller more medial branch with sequential subtotal stenosis, EF 35% unable to pass stent a fear of jeopardizing the larger widely patent branch.  PTCA performed with residual stenosis less than 50%  2.  Ischemic cardiomyopathy posterior lateral infarct with mild to moderate pacheco-infarct ischemia post-rest ejection fraction of 27%  2.1 EF estimated 30% by catheterization April 2019  2.2 echocardiogram 7/20: Mild dilation of left ventricle, mildly to moderately reduced ejection fraction of 40 to 45%, borderline concentric left ventricular hypertrophy grade 1 diastolic dysfunction mild left atrial enlargement  2.3 most recent evaluation of EF with echocardiogram January 2021 estimated at 45 to 50%  3.  Dyslipidemia  4.  Chronic tobacco  use        HPI    Patient is a 46-year-old male that presents back to the office for follow-up.  Patient has done well.  He has had issues in regards to hypotension.  Patient has been on Entresto as well as metoprolol.  He has been on low-dose amlodipine as well.  He has been having orthostatic dizziness and lightheadedness.  He is even felt presyncopal.    He has an occasional pain but nothing as severe as what he felt previous to his last catheterization.  He describes moderate exertional dyspnea.  Return to do activity he has significant shortness of breath which appears to be stable.  He does not describe PND orthopnea.    He does palpitate on occasion.  He does not describe symptoms of cerebral Bolick events..  He does have orthostatic symptoms as above but otherwise feels stable      Current Outpatient Medications on File Prior to Visit   Medication Sig Dispense Refill   • ASPIRIN LOW DOSE 81 MG EC tablet Take 81 mg by mouth Daily.     • atorvastatin (LIPITOR) 80 MG tablet Take 80 mg by mouth Daily.     • cetirizine (zyrTEC) 10 MG tablet Take 10 mg by mouth Daily.     • clopidogrel (PLAVIX) 75 MG tablet TAKE 1 TABLET BY MOUTH ONCE DAILY 30 tablet 10   • cyclobenzaprine (FLEXERIL) 10 MG tablet TAKE 1 TABLET BY MOUTH TWO TIMES A DAY 60 tablet 2   • furosemide (LASIX) 20 MG tablet Take 20 mg by mouth Daily.     • isosorbide mononitrate (IMDUR) 60 MG 24 hr tablet Take 1 tablet by mouth Every Morning. 30 tablet 11   • MAPAP 325 MG tablet 325 mg As Needed.     • metoprolol succinate XL (Toprol XL) 100 MG 24 hr tablet Take 1 tablet by mouth Daily. 30 tablet 11   • naproxen (NAPROSYN) 500 MG tablet TAKE 1 TABLET BY MOUTH TWO TIMES A DAY WITH MEALS 60 tablet 0   • nitroglycerin (NITROSTAT) 0.4 MG SL tablet 1 under the tongue as needed for angina, may repeat q5mins for up three doses 100 tablet 11   • potassium chloride (K-DUR) 10 MEQ CR tablet 2 (Two) Times a Day.     • ranolazine (Ranexa) 500 MG 12 hr tablet Take 1  tablet by mouth 2 (Two) Times a Day. 60 tablet 11   • sacubitril-valsartan (Entresto) 24-26 MG tablet Take 1 tablet by mouth 2 (Two) Times a Day. 60 tablet 11   • SENNA PLUS 8.6-50 MG per tablet 2 (Two) Times a Day.     • [DISCONTINUED] amLODIPine (NORVASC) 2.5 MG tablet TAKE 1 TABLET BY MOUTH ONCE DAILY 30 tablet 10     No current facility-administered medications on file prior to visit.        Patient has no known allergies.    Past Medical History:   Diagnosis Date   • Coronary artery disease    • Hyperlipidemia    • Hypertension    • Myocardial infarction (CMS/HCC)        Social History     Socioeconomic History   • Marital status:      Spouse name: Not on file   • Number of children: Not on file   • Years of education: Not on file   • Highest education level: Not on file   Tobacco Use   • Smoking status: Current Every Day Smoker     Packs/day: 1.00     Years: 30.00     Pack years: 30.00     Types: Cigarettes   • Smokeless tobacco: Former User   Substance and Sexual Activity   • Alcohol use: No     Frequency: Never   • Drug use: No   • Sexual activity: Defer       Family History   Problem Relation Age of Onset   • Heart disease Mother    • Cancer Mother    • Hyperlipidemia Father    • Hypertension Father        Review of Systems   Constitutional: Positive for fatigue. Negative for chills and fever.   HENT: Negative.  Negative for congestion, rhinorrhea and sore throat.    Eyes: Positive for visual disturbance (reading glasses).   Respiratory: Positive for shortness of breath (w/ exertion). Negative for apnea and chest tightness.    Cardiovascular: Positive for chest pain (w/ over exertion) and palpitations.   Gastrointestinal: Negative.    Endocrine: Negative.    Genitourinary: Negative.    Musculoskeletal: Negative.  Negative for back pain, gait problem, neck pain and neck stiffness.   Skin: Positive for rash (small rash on L arm). Negative for wound.   Allergic/Immunologic: Negative for environmental  "allergies and food allergies.   Neurological: Positive for dizziness, weakness, light-headedness and numbness (hands/chest). Negative for headaches.   Hematological: Bruises/bleeds easily.   Psychiatric/Behavioral: Negative for sleep disturbance.       Objective   Vitals:    02/24/21 1108   BP: 98/65   BP Location: Left arm   Patient Position: Sitting   Pulse: 102   SpO2: 98%   Weight: 99.2 kg (218 lb 9.6 oz)   Height: 175 cm (68.9\")      BP 98/65 (BP Location: Left arm, Patient Position: Sitting)   Pulse 102   Ht 175 cm (68.9\")   Wt 99.2 kg (218 lb 9.6 oz)   SpO2 98%   BMI 32.38 kg/m²     Lab Results (most recent)     None          Physical Exam  Vitals signs and nursing note reviewed.   Constitutional:       General: He is not in acute distress.     Appearance: Normal appearance. He is well-developed.   HENT:      Head: Normocephalic and atraumatic.   Eyes:      General: No scleral icterus.        Right eye: No discharge.         Left eye: No discharge.      Conjunctiva/sclera: Conjunctivae normal.   Neck:      Vascular: No carotid bruit.   Cardiovascular:      Rate and Rhythm: Normal rate and regular rhythm.      Heart sounds: Normal heart sounds. No murmur. No friction rub. No gallop.    Pulmonary:      Effort: Pulmonary effort is normal. No respiratory distress.      Breath sounds: Normal breath sounds. No wheezing or rales.   Chest:      Chest wall: No tenderness.   Musculoskeletal:      Right lower leg: No edema.      Left lower leg: No edema.   Skin:     General: Skin is warm and dry.      Coloration: Skin is not pale.      Findings: No erythema or rash.   Neurological:      Mental Status: He is alert and oriented to person, place, and time.      Cranial Nerves: No cranial nerve deficit.   Psychiatric:         Behavior: Behavior normal.         Procedure   Procedures       Assessment/Plan     Problems Addressed this Visit        Cardiac and Vasculature    Ischemic cardiomyopathy - Primary    Coronary " artery disease involving native coronary artery of native heart without angina pectoris    Orthostatic hypotension       Pulmonary and Pneumonias    Shortness of breath      Diagnoses       Codes Comments    Ischemic cardiomyopathy    -  Primary ICD-10-CM: I25.5  ICD-9-CM: 414.8     Shortness of breath     ICD-10-CM: R06.02  ICD-9-CM: 786.05     Coronary artery disease involving native coronary artery of native heart without angina pectoris     ICD-10-CM: I25.10  ICD-9-CM: 414.01     Orthostatic hypotension     ICD-10-CM: I95.1  ICD-9-CM: 458.0           Recommendation  1.  Patient with coronary disease with most recent intervention showing improvement of the symptoms.  Patient had PTCA of the diagonal through vein graft.  He feels much better.  He occasionally has stable discomfort but this is not progressing in any way and for now we will monitor    2 patient with ischemic cardiomyopathy and heart failure.  Most recent evaluation shows improvement to 45 to 50% we will continue current medical regimen    3.  I am stopping patient's amlodipine.  Hopefully this will improve orthostatic symptoms but improved blood pressure as he has been hypotensive the last few visits here    4.  I want him to call back in a week with symptom check to see if his symptoms continue to improve.  Otherwise we will see him back for follow-up in 2 to 3 months.  Is to follow with primary as scheduled           Navi Hanley  reports that he has been smoking cigarettes. He has a 30.00 pack-year smoking history. He has quit using smokeless tobacco.. I have educated him on the risk of diseases from using tobacco products such as cancer, COPD and heart disease.     I advised him to quit and he is not willing to quit.    I spent 3  minutes counseling the patient.          Patient's Body mass index is 32.38 kg/m². BMI is above normal parameters. Recommendations include: educational material.       Electronically signed by:

## 2021-02-24 NOTE — PATIENT INSTRUCTIONS
How to Quarantine at Home  Information for Patients and Families    These instructions are for people with confirmed or suspected COVID-19 who do not need to be hospitalized and those with confirmed COVID-19 who were hospitalized and discharged to care for themselves at home.    If you were tested through the Health Department  The Health Department will monitor your wellbeing.  If it is determined that you do not need to be hospitalized and can be isolated at home, you will be monitored by staff from your local or state health department.     If you were tested through a Commercial Lab  You will need to monitor yourself and report changes in your symptoms to your doctor.  See the section below called Monitor Your Symptoms.    Follow these steps until a healthcare provider or local or state health department says you can return to your normal activities.    Stay home except to get medical care  • Restrict activities outside your home, except for getting medical care.   • Do not go to work, school, or public areas.   • Avoid using public transportation, ride-sharing, or taxis.    Separate yourself from other people and animals in your home  People  As much as possible, you should stay in a specific room and away from other people in your home. Also, you should use a separate bathroom, if available.    Animals  You should restrict contact with pets and other animals while you are sick with COVID-19, just like you would around other people. When possible, have another member of your household care for your animals while you are sick. If you are sick with COVID-19, avoid contact with your pet, including petting, snuggling, being kissed or licked, and sharing food. If you must care for your pet or be around animals while you are sick, wash your hands before and after you interact with pets and wear a facemask. See COVID-19 and Animals for more information.    Call ahead before visiting your doctor  If you have a medical  appointment, call the healthcare provider and tell them that you have or may have COVID-19. This information will help the healthcare provider’s office take steps to keep other people from getting infected or exposed.    Wear a facemask  You should wear a facemask when you are around other people (e.g., sharing a room or vehicle) or pets and before you enter a healthcare provider’s office.     If you are not able to wear a facemask (for example, because it causes trouble breathing), then people who live with you should not stay in the same room with you, or they should wear a facemask if they enter your room.    Cover your coughs and sneezes  • Cover your mouth and nose with a tissue when you cough or sneeze.   • Throw used tissues in a lined trash can.   • Immediately wash your hands with soap and water for at least 20 seconds or, if soap and water are not available, clean your hands with an alcohol-based hand  that contains at least 60% alcohol.    Clean your hands often  • Wash your hands often with soap and water for at least 20 seconds, especially after blowing your nose, coughing, or sneezing; going to the bathroom; and before eating or preparing food.     • If soap and water are not readily available, use an alcohol-based hand  with at least 60% alcohol, covering all surfaces of your hands and rubbing them together until they feel dry.    • Soap and water are the best option if hands are visibly dirty. Avoid touching your eyes, nose, and mouth with unwashed hands.    Avoid sharing personal household items  • You should not share dishes, drinking glasses, cups, eating utensils, towels, or bedding with other people or pets in your home.   • After using these items, they should be washed thoroughly with soap and water.    Clean all “high-touch” surfaces everyday  • High touch surfaces include counters, tabletops, doorknobs, bathroom fixtures, toilets, phones, keyboards, tablets, and bedside  tables.   • Also, clean any surfaces that may have blood, stool, or body fluids on them.   • Use a household cleaning spray or wipe, according to the label instructions. Labels contain instructions for safe and effective use of the cleaning product, including precautions you should take when applying the product, such as wearing gloves and making sure you have good ventilation during use of the product.    Monitor your symptoms  • Seek prompt medical attention if your illness is worsening (e.g., difficulty breathing).   • Before seeking care, call your healthcare provider and tell them that you have, or are being evaluated for, COVID-19.   • Put on a facemask before you enter the facility.     • These steps will help the healthcare provider’s office to keep other people in the office or waiting room from getting infected or exposed.   • Persons who are placed under active monitoring or facilitated self-monitoring should follow instructions provided by their local health department or occupational health professionals, as appropriate.  • If you have a medical emergency and need to call 911, notify the dispatch personnel that you have, or are being evaluated for COVID-19. If possible, put on a facemask before emergency medical services arrive.    Discontinuing home isolation  Patients with confirmed COVID-19 should remain under home isolation precautions until the risk of secondary transmission to others is thought to be low. The decision to discontinue home isolation precautions should be made on a case-by-case basis, in consultation with healthcare providers and state and local health departments.    The below content are for household members, intimate partners, and caregivers of a patient with symptomatic laboratory-confirmed COVID-19 or a patient under investigation:    Household members, intimate partners, and caregivers may have close contact with a person with symptomatic, laboratory-confirmed COVID-19 or a  person under investigation.     Close contacts should monitor their health; they should call their healthcare provider right away if they develop symptoms suggestive of COVID-19 (e.g., fever, cough, shortness of breath)     Close contacts should also follow these recommendations:  • Make sure that you understand and can help the patient follow their healthcare provider’s instructions for medication(s) and care. You should help the patient with basic needs in the home and provide support for getting groceries, prescriptions, and other personal needs.  • Monitor the patient’s symptoms. If the patient is getting sicker, call his or her healthcare provider and tell them that the patient has laboratory-confirmed COVID-19. This will help the healthcare provider’s office take steps to keep other people in the office or waiting room from getting infected. Ask the healthcare provider to call the local or [facility] health department for additional guidance. If the patient has a medical emergency and you need to call 911, notify the dispatch personnel that the patient has, or is being evaluated for COVID-19.  • Household members should stay in another room or be  from the patient as much as possible. Household members should use a separate bedroom and bathroom, if available.  • Prohibit visitors who do not have an essential need to be in the home.  • Household members should care for any pets in the home. Do not handle pets or other animals while sick.  For more information, see COVID-19 and Animals.  • Make sure that shared spaces in the home have good air flow, such as by an air conditioner or an opened window, weather permitting.  • Perform hand hygiene frequently. Wash your hands often with soap and water for at least 20 seconds or use an alcohol-based hand  that contains 60 to 95% alcohol, covering all surfaces of your hands and rubbing them together until they feel dry. Soap and water should be used  preferentially if hands are visibly dirty.  • Avoid touching your eyes, nose, and mouth with unwashed hands.  • The patient should wear a facemask when you are around other people. If the patient is not able to wear a facemask (for example, because it causes trouble breathing), you, as the caregiver, should wear a mask when you are in the same room as the patient.  • Wear a disposable facemask and gloves when you touch or have contact with the patient’s blood, stool, or body fluids, such as saliva, sputum, nasal mucus, vomit, or urine.   o Throw out disposable facemasks and gloves after using them. Do not reuse.  o When removing personal protective equipment, first remove and dispose of gloves. Then, immediately clean your hands with soap and water or alcohol-based hand . Next, remove and dispose of facemask, and immediately clean your hands again with soap and water or alcohol-based hand .  • Avoid sharing household items with the patient. You should not share dishes, drinking glasses, cups, eating utensils, towels, bedding, or other items. After the patient uses these items, you should wash them thoroughly (see below “Wash laundry thoroughly”).  • Clean all “high-touch” surfaces, such as counters, tabletops, doorknobs, bathroom fixtures, toilets, phones, keyboards, tablets, and bedside tables, every day. Also, clean any surfaces that may have blood, stool, or body fluids on them.   o Use a household cleaning spray or wipe, according to the label instructions. Labels contain instructions for safe and effective use of the cleaning product including precautions you should take when applying the product, such as wearing gloves and making sure you have good ventilation during use of the product.  • Wash laundry thoroughly.   o Immediately remove and wash clothes or bedding that have blood, stool, or body fluids on them.  o Wear disposable gloves while handling soiled items and keep soiled items away  from your body. Clean your hands (with soap and water or an alcohol-based hand ) immediately after removing your gloves.  o Read and follow directions on labels of laundry or clothing items and detergent. In general, using a normal laundry detergent according to washing machine instructions and dry thoroughly using the warmest temperatures recommended on the clothing label.  • Place all used disposable gloves, facemasks, and other contaminated items in a lined container before disposing of them with other household waste. Clean your hands (with soap and water or an alcohol-based hand ) immediately after handling these items. Soap and water should be used preferentially if hands are visibly dirty.  • Discuss any additional questions with your state or local health department or healthcare provider.    Adapted from information provided by the Centers for Disease Control and Prevention.  For more information, visit https://www.cdc.gov/coronavirus/2019-ncov/hcp/guidance-prevent-spread.htmlHeart-Healthy Eating Plan  Heart-healthy meal planning includes:  · Eating less unhealthy fats.  · Eating more healthy fats.  · Making other changes in your diet.  Talk with your doctor or a diet specialist (dietitian) to create an eating plan that is right for you.  What is my plan?  Your doctor may recommend an eating plan that includes:  · Total fat: ______% or less of total calories a day.  · Saturated fat: ______% or less of total calories a day.  · Cholesterol: less than _________mg a day.  What are tips for following this plan?  Cooking  Avoid frying your food. Try to bake, boil, grill, or broil it instead. You can also reduce fat by:  · Removing the skin from poultry.  · Removing all visible fats from meats.  · Steaming vegetables in water or broth.  Meal planning    · At meals, divide your plate into four equal parts:  ? Fill one-half of your plate with vegetables and green salads.  ? Fill one-fourth of your  plate with whole grains.  ? Fill one-fourth of your plate with lean protein foods.  · Eat 4-5 servings of vegetables per day. A serving of vegetables is:  ? 1 cup of raw or cooked vegetables.  ? 2 cups of raw leafy greens.  · Eat 4-5 servings of fruit per day. A serving of fruit is:  ? 1 medium whole fruit.  ? ¼ cup of dried fruit.  ? ½ cup of fresh, frozen, or canned fruit.  ? ½ cup of 100% fruit juice.  · Eat more foods that have soluble fiber. These are apples, broccoli, carrots, beans, peas, and barley. Try to get 20-30 g of fiber per day.  · Eat 4-5 servings of nuts, legumes, and seeds per week:  ? 1 serving of dried beans or legumes equals ½ cup after being cooked.  ? 1 serving of nuts is ¼ cup.  ? 1 serving of seeds equals 1 tablespoon.  General information  · Eat more home-cooked food. Eat less restaurant, buffet, and fast food.  · Limit or avoid alcohol.  · Limit foods that are high in starch and sugar.  · Avoid fried foods.  · Lose weight if you are overweight.  · Keep track of how much salt (sodium) you eat. This is important if you have high blood pressure. Ask your doctor to tell you more about this.  · Try to add vegetarian meals each week.  Fats  · Choose healthy fats. These include olive oil and canola oil, flaxseeds, walnuts, almonds, and seeds.  · Eat more omega-3 fats. These include salmon, mackerel, sardines, tuna, flaxseed oil, and ground flaxseeds. Try to eat fish at least 2 times each week.  · Check food labels. Avoid foods with trans fats or high amounts of saturated fat.  · Limit saturated fats.  ? These are often found in animal products, such as meats, butter, and cream.  ? These are also found in plant foods, such as palm oil, palm kernel oil, and coconut oil.  · Avoid foods with partially hydrogenated oils in them. These have trans fats. Examples are stick margarine, some tub margarines, cookies, crackers, and other baked goods.  What foods can I eat?  Fruits  All fresh, canned (in  natural juice), or frozen fruits.  Vegetables  Fresh or frozen vegetables (raw, steamed, roasted, or grilled). Green salads.  Grains  Most grains. Choose whole wheat and whole grains most of the time. Rice and pasta, including brown rice and pastas made with whole wheat.  Meats and other proteins  Lean, well-trimmed beef, veal, pork, and lamb. Chicken and turkey without skin. All fish and shellfish. Wild duck, rabbit, pheasant, and venison. Egg whites or low-cholesterol egg substitutes. Dried beans, peas, lentils, and tofu. Seeds and most nuts.  Dairy  Low-fat or nonfat cheeses, including ricotta and mozzarella. Skim or 1% milk that is liquid, powdered, or evaporated. Buttermilk that is made with low-fat milk. Nonfat or low-fat yogurt.  Fats and oils  Non-hydrogenated (trans-free) margarines. Vegetable oils, including soybean, sesame, sunflower, olive, peanut, safflower, corn, canola, and cottonseed. Salad dressings or mayonnaise made with a vegetable oil.  Beverages  Mineral water. Coffee and tea. Diet carbonated beverages.  Sweets and desserts  Sherbet, gelatin, and fruit ice. Small amounts of dark chocolate.  Limit all sweets and desserts.  Seasonings and condiments  All seasonings and condiments.  The items listed above may not be a complete list of foods and drinks you can eat. Contact a dietitian for more options.  What foods should I avoid?  Fruits  Canned fruit in heavy syrup. Fruit in cream or butter sauce. Fried fruit. Limit coconut.  Vegetables  Vegetables cooked in cheese, cream, or butter sauce. Fried vegetables.  Grains  Breads that are made with saturated or trans fats, oils, or whole milk. Croissants. Sweet rolls. Donuts. High-fat crackers, such as cheese crackers.  Meats and other proteins  Fatty meats, such as hot dogs, ribs, sausage, alfonso, rib-eye roast or steak. High-fat deli meats, such as salami and bologna. Caviar. Domestic duck and goose. Organ meats, such as liver.  Dairy  Cream, sour  cream, cream cheese, and creamed cottage cheese. Whole-milk cheeses. Whole or 2% milk that is liquid, evaporated, or condensed. Whole buttermilk. Cream sauce or high-fat cheese sauce. Yogurt that is made from whole milk.  Fats and oils  Meat fat, or shortening. Cocoa butter, hydrogenated oils, palm oil, coconut oil, palm kernel oil. Solid fats and shortenings, including alfonso fat, salt pork, lard, and butter. Nondairy cream substitutes. Salad dressings with cheese or sour cream.  Beverages  Regular sodas and juice drinks with added sugar.  Sweets and desserts  Frosting. Pudding. Cookies. Cakes. Pies. Milk chocolate or white chocolate. Buttered syrups. Full-fat ice cream or ice cream drinks.  The items listed above may not be a complete list of foods and drinks to avoid. Contact a dietitian for more information.  Summary  · Heart-healthy meal planning includes eating less unhealthy fats, eating more healthy fats, and making other changes in your diet.  · Eat a balanced diet. This includes fruits and vegetables, low-fat or nonfat dairy, lean protein, nuts and legumes, whole grains, and heart-healthy oils and fats.  This information is not intended to replace advice given to you by your health care provider. Make sure you discuss any questions you have with your health care provider.  Document Revised: 02/21/2019 Document Reviewed: 01/25/2019  [employer] Patient Education © 2020 [employer]

## 2021-03-22 DIAGNOSIS — R07.89 CHEST WALL PAIN: ICD-10-CM

## 2021-03-22 RX ORDER — CYCLOBENZAPRINE HCL 10 MG
TABLET ORAL
Qty: 60 TABLET | Refills: 1 | Status: SHIPPED | OUTPATIENT
Start: 2021-03-22 | End: 2021-06-15

## 2021-06-15 DIAGNOSIS — R07.89 CHEST WALL PAIN: ICD-10-CM

## 2021-06-15 RX ORDER — CYCLOBENZAPRINE HCL 10 MG
TABLET ORAL
Qty: 60 TABLET | Refills: 1 | Status: SHIPPED | OUTPATIENT
Start: 2021-06-15 | End: 2021-09-27

## 2021-06-24 ENCOUNTER — OFFICE VISIT (OUTPATIENT)
Dept: CARDIOLOGY | Facility: CLINIC | Age: 46
End: 2021-06-24

## 2021-06-24 VITALS
OXYGEN SATURATION: 98 % | HEIGHT: 69 IN | DIASTOLIC BLOOD PRESSURE: 70 MMHG | WEIGHT: 212.2 LBS | SYSTOLIC BLOOD PRESSURE: 93 MMHG | HEART RATE: 102 BPM | BODY MASS INDEX: 31.43 KG/M2

## 2021-06-24 DIAGNOSIS — R06.02 SHORTNESS OF BREATH: ICD-10-CM

## 2021-06-24 DIAGNOSIS — I25.10 CORONARY ARTERY DISEASE INVOLVING NATIVE CORONARY ARTERY OF NATIVE HEART, ANGINA PRESENCE UNSPECIFIED: Primary | ICD-10-CM

## 2021-06-24 DIAGNOSIS — I95.89 OTHER SPECIFIED HYPOTENSION: ICD-10-CM

## 2021-06-24 DIAGNOSIS — I20.8 STABLE ANGINA PECTORIS (HCC): ICD-10-CM

## 2021-06-24 PROBLEM — I95.9 ARTERIAL HYPOTENSION: Status: ACTIVE | Noted: 2021-02-24

## 2021-06-24 PROCEDURE — 99214 OFFICE O/P EST MOD 30 MIN: CPT | Performed by: PHYSICIAN ASSISTANT

## 2021-06-24 RX ORDER — METOPROLOL SUCCINATE 50 MG/1
50 TABLET, EXTENDED RELEASE ORAL DAILY
Qty: 30 TABLET | Refills: 11 | Status: SHIPPED | OUTPATIENT
Start: 2021-06-24 | End: 2021-07-07

## 2021-06-24 RX ORDER — ISOSORBIDE MONONITRATE 30 MG/1
30 TABLET, EXTENDED RELEASE ORAL EVERY MORNING
Qty: 30 TABLET | Refills: 11 | Status: SHIPPED | OUTPATIENT
Start: 2021-06-24 | End: 2021-07-07

## 2021-06-24 NOTE — PROGRESS NOTES
Problem list     Subjective   Navi Hanley is a 46 y.o. male     Chief Complaint   Patient presents with   • Follow-up   • Hypotension        Problem list  1.  Coronary artery disease  1.1 cardiac catheterization April 2019 demonstrated high-grade three-vessel disease with an EF of 30% with recommendations for coronary artery bypass grafting  1.2 coronary artery bypass grafting, three-vessel, April 2019 per patient report, inadequate data  1.3.  Stress test 7/20: Severely depressed post stress ejection fraction of 27% with global hypokinesis and basilar inferior lateral lateral akinesis positive scintigraphic the defect compatible with a relatively large posterior lateral infarct and mild to moderate pacheco-infarct ischemia  1.4 left heart catheterization 8/20: Patient with occluded LAD but patent LIMA to LAD and patent vein graft to the diagonal.  Nonobstructive circumflex and RCA.  PTCA of the diagonal for medically refractory angina but residual stenosis noted.  Stenting was not performed because of fear of jeopardizing ongoing grafted vessel.  Medical meds recommended   2.  Ischemic cardiomyopathy posterior lateral infarct with mild to moderate pacheco-infarct ischemia post-rest ejection fraction of 27%  2.1 EF estimated 30% by catheterization April 2019  2.2 echocardiogram 7/20: Mild dilation of left ventricle, mildly to moderately reduced ejection fraction of 40 to 45%, borderline concentric left ventricular hypertrophy grade 1 diastolic dysfunction mild left atrial enlargement  2.3 most recent evaluation of EF with echocardiogram January 2021 estimated at 45 to 50%  3.  Dyslipidemia  4.  Chronic tobacco use      HPI    Patient is a 46-year-old male that presents back to the office for follow-up.  As above, patient has significant coronary disease and ischemic cardiomyopathy although that appears to have improved.  Most recent evaluation of systolic function was 45 to 50%.    Patient has had significant  hypotension.  He brings in blood pressure readings in which she has frequent systolic blood pressures less than 100.  In fact, frequent blood pressures in the 70s systolic.  He has had lightheadedness, dizziness and even syncopal episodes.  Patient had difficulty getting to the office or getting an appointment to the office.  Last office visit, we stopped patient's amlodipine because of concern for hypotension.    He has chest discomfort when he exerts.  When he tries to exert for an extended period he will get discomfort and that appears to not have progressed since his last visit.  He has moderate levels of dyspnea at times but does not describe any progressive dyspnea.  He does not describe PND orthopnea.    Occasionally he may feel palpitations but does not describe any strokelike symptoms.  Does not describe any presyncope or syncope and otherwise patient is doing well      Current Outpatient Medications on File Prior to Visit   Medication Sig Dispense Refill   • ASPIRIN LOW DOSE 81 MG EC tablet Take 81 mg by mouth Daily.     • atorvastatin (LIPITOR) 80 MG tablet Take 80 mg by mouth Daily.     • cetirizine (zyrTEC) 10 MG tablet Take 10 mg by mouth Daily.     • clopidogrel (PLAVIX) 75 MG tablet TAKE 1 TABLET BY MOUTH ONCE DAILY 30 tablet 10   • cyclobenzaprine (FLEXERIL) 10 MG tablet TAKE 1 TABLET BY MOUTH TWO TIMES A DAY 60 tablet 1   • furosemide (LASIX) 20 MG tablet Take 20 mg by mouth Daily.     • MAPAP 325 MG tablet 325 mg As Needed.     • naproxen (NAPROSYN) 500 MG tablet TAKE 1 TABLET BY MOUTH TWO TIMES A DAY WITH MEALS 60 tablet 0   • nitroglycerin (NITROSTAT) 0.4 MG SL tablet 1 under the tongue as needed for angina, may repeat q5mins for up three doses 100 tablet 11   • potassium chloride (K-DUR) 10 MEQ CR tablet 2 (Two) Times a Day.     • ranolazine (Ranexa) 500 MG 12 hr tablet Take 1 tablet by mouth 2 (Two) Times a Day. 60 tablet 11   • sacubitril-valsartan (Entresto) 24-26 MG tablet Take 1 tablet by  mouth 2 (Two) Times a Day. 60 tablet 11   • SENNA PLUS 8.6-50 MG per tablet 2 (Two) Times a Day.     • [DISCONTINUED] isosorbide mononitrate (IMDUR) 60 MG 24 hr tablet Take 1 tablet by mouth Every Morning. 30 tablet 11   • [DISCONTINUED] metoprolol succinate XL (Toprol XL) 100 MG 24 hr tablet Take 1 tablet by mouth Daily. 30 tablet 11     No current facility-administered medications on file prior to visit.       Patient has no known allergies.    Past Medical History:   Diagnosis Date   • Coronary artery disease    • Hyperlipidemia    • Hypertension    • Myocardial infarction (CMS/[facility])        Social History     Socioeconomic History   • Marital status:      Spouse name: Not on file   • Number of children: Not on file   • Years of education: Not on file   • Highest education level: Not on file   Tobacco Use   • Smoking status: Current Every Day Smoker     Packs/day: 1.00     Years: 30.00     Pack years: 30.00     Types: Cigarettes   • Smokeless tobacco: Former User   Substance and Sexual Activity   • Alcohol use: No   • Drug use: No   • Sexual activity: Defer       Family History   Problem Relation Age of Onset   • Heart disease Mother    • Cancer Mother    • Hyperlipidemia Father    • Hypertension Father        Review of Systems   Constitutional: Positive for fatigue. Negative for chills and fever.   HENT: Negative.  Negative for congestion, rhinorrhea and sore throat.    Respiratory: Positive for chest tightness (w/ exertion) and shortness of breath. Negative for apnea.    Cardiovascular: Positive for chest pain (w/ exertion) and palpitations. Negative for leg swelling.   Gastrointestinal: Negative.    Endocrine: Negative.    Genitourinary: Negative.    Musculoskeletal: Negative.  Negative for back pain, gait problem, neck pain and neck stiffness.   Skin: Positive for rash (BUE/chest). Negative for wound.   Allergic/Immunologic: Negative for environmental allergies and food allergies.   Neurological:  "Positive for dizziness (w/ low BP), weakness, light-headedness and numbness (hands/L side of chest). Negative for headaches.   Hematological: Bruises/bleeds easily.   Psychiatric/Behavioral: Positive for sleep disturbance (states, \"wakes up gasping for air\").       Objective   Vitals:    06/24/21 0901   BP: 93/70   BP Location: Left arm   Patient Position: Sitting   Pulse: 102   SpO2: 98%   Weight: 96.3 kg (212 lb 3.2 oz)   Height: 175 cm (68.9\")      BP 93/70 (BP Location: Left arm, Patient Position: Sitting)   Pulse 102   Ht 175 cm (68.9\")   Wt 96.3 kg (212 lb 3.2 oz)   SpO2 98%   BMI 31.43 kg/m²     Lab Results (most recent)     None          Physical Exam  Vitals and nursing note reviewed.   Constitutional:       General: He is not in acute distress.     Appearance: Normal appearance. He is well-developed.   HENT:      Head: Normocephalic and atraumatic.   Eyes:      General: No scleral icterus.        Right eye: No discharge.         Left eye: No discharge.      Conjunctiva/sclera: Conjunctivae normal.   Neck:      Vascular: No carotid bruit.   Cardiovascular:      Rate and Rhythm: Normal rate and regular rhythm.      Heart sounds: Normal heart sounds. No murmur heard.   No friction rub. No gallop.    Pulmonary:      Effort: Pulmonary effort is normal. No respiratory distress.      Breath sounds: Normal breath sounds. No wheezing or rales.   Chest:      Chest wall: No tenderness.   Musculoskeletal:      Right lower leg: No edema.      Left lower leg: No edema.   Skin:     General: Skin is warm and dry.      Coloration: Skin is not pale.      Findings: No erythema or rash.   Neurological:      Mental Status: He is alert and oriented to person, place, and time.      Cranial Nerves: No cranial nerve deficit.   Psychiatric:         Behavior: Behavior normal.         Procedure   Procedures       Assessment/Plan     Problems Addressed this Visit        Cardiac and Vasculature    Chest pain    Arterial " hypotension       Pulmonary and Pneumonias    Shortness of breath      Other Visit Diagnoses     Coronary artery disease involving native coronary artery of native heart, angina presence unspecified    -  Primary    Relevant Medications    metoprolol succinate XL (TOPROL-XL) 50 MG 24 hr tablet    isosorbide mononitrate (IMDUR) 30 MG 24 hr tablet      Diagnoses       Codes Comments    Coronary artery disease involving native coronary artery of native heart, angina presence unspecified    -  Primary ICD-10-CM: I25.10  ICD-9-CM: 414.01     Shortness of breath     ICD-10-CM: R06.02  ICD-9-CM: 786.05     Other specified hypotension     ICD-10-CM: I95.89  ICD-9-CM: 458.8     Stable angina pectoris (CMS/[facility])     ICD-10-CM: I20.8  ICD-9-CM: 413.9           Recommendation  1.  Patient is a 46-year-old male that is having difficulty with blood pressure and has had significant hypotension and even syncopal episodes because of hypotension.  I am decreasing his metoprolol to 50 mg.  I am decreasing isosorbide to 30 mg.  He has did not take any more blood pressure medication today and start metoprolol 50 mg tomorrow and decrease dose of isosorbide to 30 mg tomorrow.  I want him to call back in 1 week with blood pressure readings.  I want to see him back officially in 2 weeks.    2.  Our hope is that we can adjust medications to improve his blood pressure which would in turn possibly improve his chest pain.  Also his syncope dizziness etc. is likely from severe hypotension    3.  I will to see him back soon to reevaluate.  He was instructed to call our office and we are expediting appointment 2 weeks to reevaluate.  He is to follow-up with primary as scheduled.           Electronically signed by:

## 2021-07-07 ENCOUNTER — OFFICE VISIT (OUTPATIENT)
Dept: CARDIOLOGY | Facility: CLINIC | Age: 46
End: 2021-07-07

## 2021-07-07 VITALS
HEART RATE: 101 BPM | HEIGHT: 69 IN | SYSTOLIC BLOOD PRESSURE: 86 MMHG | DIASTOLIC BLOOD PRESSURE: 66 MMHG | BODY MASS INDEX: 31.13 KG/M2 | OXYGEN SATURATION: 97 % | WEIGHT: 210.2 LBS

## 2021-07-07 DIAGNOSIS — I25.5 ISCHEMIC CARDIOMYOPATHY: ICD-10-CM

## 2021-07-07 DIAGNOSIS — I25.10 CORONARY ARTERY DISEASE INVOLVING NATIVE CORONARY ARTERY OF NATIVE HEART, ANGINA PRESENCE UNSPECIFIED: Primary | ICD-10-CM

## 2021-07-07 DIAGNOSIS — R06.02 SHORTNESS OF BREATH: ICD-10-CM

## 2021-07-07 DIAGNOSIS — I20.8 STABLE ANGINA PECTORIS (HCC): ICD-10-CM

## 2021-07-07 PROCEDURE — 99214 OFFICE O/P EST MOD 30 MIN: CPT | Performed by: PHYSICIAN ASSISTANT

## 2021-07-07 NOTE — PROGRESS NOTES
Problem list     Subjective   Navi Hanley is a 46 y.o. male     Chief Complaint   Patient presents with   • Follow-up   • Coronary Artery Disease   • Hypotension   Problem list  1.  Coronary artery disease  1.1 cardiac catheterization April 2019 demonstrated high-grade three-vessel disease with an EF of 30% with recommendations for coronary artery bypass grafting  1.2 coronary artery bypass grafting, three-vessel, April 2019 per patient report, inadequate data  1.3.  Stress test 7/20: Severely depressed post stress ejection fraction of 27% with global hypokinesis and basilar inferior lateral lateral akinesis positive scintigraphic the defect compatible with a relatively large posterior lateral infarct and mild to moderate pacheco-infarct ischemia  1.4 left heart catheterization 8/20: Patient with occluded LAD but patent LIMA to LAD and patent vein graft to the diagonal.  Nonobstructive circumflex and RCA.  PTCA of the diagonal for medically refractory angina but residual stenosis noted.  Stenting was not performed because of fear of jeopardizing ongoing grafted vessel.  Medical meds recommended   2.  Ischemic cardiomyopathy posterior lateral infarct with mild to moderate pacheco-infarct ischemia post-rest ejection fraction of 27%  2.1 EF estimated 30% by catheterization April 2019  2.2 echocardiogram 7/20: Mild dilation of left ventricle, mildly to moderately reduced ejection fraction of 40 to 45%, borderline concentric left ventricular hypertrophy grade 1 diastolic dysfunction mild left atrial enlargement  2.3 most recent evaluation of EF with echocardiogram January 2021 estimated at 45 to 50%  3.  Dyslipidemia  4.  Chronic tobacco use    HPI    Patient is a 46-year-old male that presents back to the office for routine evaluation.  We saw him 2 weeks ago.  He has history of significant coronary disease and unfortunately continues to use tobacco.  He has had recurrent stenosis.  Patient had significant hypotension the  last visit we decreased his metoprolol.  He has a degree of mild cardiomyopathy and was placed on medication.    Unfortunately, patient continues to have hypotension despite lower doses of beta-blocker.  He brings in blood pressure log which systolic blood pressures can range anywhere from the 70s to 80s.    He has had significant lightheadedness he has had syncope in the past.          Patient brought in medicine list to appointment, it's been reviewed with patient and med list was updated in the chart.     Current Outpatient Medications on File Prior to Visit   Medication Sig Dispense Refill   • ASPIRIN LOW DOSE 81 MG EC tablet Take 81 mg by mouth Daily.     • atorvastatin (LIPITOR) 80 MG tablet Take 80 mg by mouth Daily.     • cetirizine (zyrTEC) 10 MG tablet Take 10 mg by mouth Daily.     • clopidogrel (PLAVIX) 75 MG tablet TAKE 1 TABLET BY MOUTH ONCE DAILY 30 tablet 10   • cyclobenzaprine (FLEXERIL) 10 MG tablet TAKE 1 TABLET BY MOUTH TWO TIMES A DAY 60 tablet 1   • furosemide (LASIX) 20 MG tablet Take 20 mg by mouth Daily.     • MAPAP 325 MG tablet 325 mg As Needed.     • naproxen (NAPROSYN) 500 MG tablet TAKE 1 TABLET BY MOUTH TWO TIMES A DAY WITH MEALS 60 tablet 0   • nitroglycerin (NITROSTAT) 0.4 MG SL tablet 1 under the tongue as needed for angina, may repeat q5mins for up three doses 100 tablet 11   • potassium chloride (K-DUR) 10 MEQ CR tablet 2 (Two) Times a Day.     • ranolazine (Ranexa) 500 MG 12 hr tablet Take 1 tablet by mouth 2 (Two) Times a Day. 60 tablet 11   • sacubitril-valsartan (Entresto) 24-26 MG tablet Take 1 tablet by mouth 2 (Two) Times a Day. 60 tablet 11   • SENNA PLUS 8.6-50 MG per tablet 2 (Two) Times a Day.       No current facility-administered medications on file prior to visit.       Patient has no known allergies.    Past Medical History:   Diagnosis Date   • Coronary artery disease    • Hyperlipidemia    • Hypertension    • Myocardial infarction (CMS/[facility])        Social History    "    Socioeconomic History   • Marital status:      Spouse name: Not on file   • Number of children: Not on file   • Years of education: Not on file   • Highest education level: Not on file   Tobacco Use   • Smoking status: Current Every Day Smoker     Packs/day: 1.00     Years: 30.00     Pack years: 30.00     Types: Cigarettes   • Smokeless tobacco: Former User   Substance and Sexual Activity   • Alcohol use: No   • Drug use: No   • Sexual activity: Defer       Family History   Problem Relation Age of Onset   • Heart disease Mother    • Cancer Mother    • Hyperlipidemia Father    • Hypertension Father        Review of Systems   Constitutional: Negative for chills, fatigue and fever.   HENT: Negative.  Negative for congestion, rhinorrhea and sore throat.    Respiratory: Positive for shortness of breath (w/ any exertion). Negative for apnea and chest tightness.    Cardiovascular: Positive for chest pain (\"no worse than usual\") and palpitations. Negative for leg swelling.   Gastrointestinal: Negative.    Endocrine: Negative.    Genitourinary: Negative.    Musculoskeletal: Negative.  Negative for back pain, gait problem, neck pain and neck stiffness.   Skin: Positive for rash (both arms/chest). Negative for wound.   Allergic/Immunologic: Negative for environmental allergies and food allergies.   Neurological: Positive for dizziness, weakness, light-headedness and numbness (hands). Negative for headaches.   Hematological: Bruises/bleeds easily.   Psychiatric/Behavioral: Negative.  Negative for sleep disturbance.       Objective   Vitals:    07/07/21 1129   BP: (!) 86/66   BP Location: Left arm   Patient Position: Sitting   Pulse: 101   SpO2: 97%   Weight: 95.3 kg (210 lb 3.2 oz)   Height: 175 cm (68.9\")      BP (!) 86/66 (BP Location: Left arm, Patient Position: Sitting)   Pulse 101   Ht 175 cm (68.9\")   Wt 95.3 kg (210 lb 3.2 oz)   SpO2 97%   BMI 31.13 kg/m²     Lab Results (most recent)     None    "       Physical Exam  Vitals and nursing note reviewed.   Constitutional:       General: He is not in acute distress.     Appearance: Normal appearance. He is well-developed.   HENT:      Head: Normocephalic and atraumatic.   Eyes:      General: No scleral icterus.        Right eye: No discharge.         Left eye: No discharge.      Conjunctiva/sclera: Conjunctivae normal.   Neck:      Vascular: No carotid bruit.   Cardiovascular:      Rate and Rhythm: Normal rate and regular rhythm.      Heart sounds: Normal heart sounds. No murmur heard.   No friction rub. No gallop.    Pulmonary:      Effort: Pulmonary effort is normal. No respiratory distress.      Breath sounds: Normal breath sounds. No wheezing or rales.   Chest:      Chest wall: No tenderness.   Musculoskeletal:      Right lower leg: No edema.      Left lower leg: No edema.   Skin:     General: Skin is warm and dry.      Coloration: Skin is not pale.      Findings: No erythema or rash.   Neurological:      Mental Status: He is alert and oriented to person, place, and time.      Cranial Nerves: No cranial nerve deficit.   Psychiatric:         Behavior: Behavior normal.         Procedure   Procedures       Assessment/Plan     Problems Addressed this Visit        Cardiac and Vasculature    Ischemic cardiomyopathy    Relevant Orders    Adult Transthoracic Echo Complete W/ Cont if Necessary Per Protocol    Stress Test With Myocardial Perfusion One Day (Completed)    Coronary artery disease involving native coronary artery of native heart - Primary    Relevant Orders    Adult Transthoracic Echo Complete W/ Cont if Necessary Per Protocol    Stress Test With Myocardial Perfusion One Day (Completed)    Chest pain    Relevant Orders    Adult Transthoracic Echo Complete W/ Cont if Necessary Per Protocol    Stress Test With Myocardial Perfusion One Day (Completed)       Pulmonary and Pneumonias    Shortness of breath    Relevant Orders    Adult Transthoracic Echo Complete  W/ Cont if Necessary Per Protocol    Stress Test With Myocardial Perfusion One Day (Completed)      Diagnoses       Codes Comments    Coronary artery disease involving native coronary artery of native heart, angina presence unspecified    -  Primary ICD-10-CM: I25.10  ICD-9-CM: 414.01     Shortness of breath     ICD-10-CM: R06.02  ICD-9-CM: 786.05     Ischemic cardiomyopathy     ICD-10-CM: I25.5  ICD-9-CM: 414.8     Stable angina pectoris (CMS/HCC)     ICD-10-CM: I20.8  ICD-9-CM: 413.9             Recommendation  1.  Because of the above symptoms, would like to schedule testing.  With history of coronary disease and recurrent symptoms of discomfort and dyspnea, we will schedule accordingly.  Stress test and echocardiogram will be ordered    2.  We have discontinued much of his medications because of his continued significant hypotension.  Beta-blocker will be discontinued.  We will monitor blood pressure closely.  He is on Lasix therapy to help with lower extremity edema.  We may have to consider decreasing the dose or stopping it if he continues to be significantly hypotensive    3.  We will see patient back for follow-up after testing.  Follow-up with primary as scheduled        Electronically signed by:

## 2021-07-07 NOTE — PATIENT INSTRUCTIONS
Steps to Quit Smoking  Smoking tobacco is the leading cause of preventable death. It can affect almost every organ in the body. Smoking puts you and people around you at risk for many serious, long-lasting (chronic) diseases. Quitting smoking can be hard, but it is one of the best things that you can do for your health. It is never too late to quit.  How do I get ready to quit?  When you decide to quit smoking, make a plan to help you succeed. Before you quit:  · Pick a date to quit. Set a date within the next 2 weeks to give you time to prepare.  · Write down the reasons why you are quitting. Keep this list in places where you will see it often.  · Tell your family, friends, and co-workers that you are quitting. Their support is important.  · Talk with your doctor about the choices that may help you quit.  · Find out if your health insurance will pay for these treatments.  · Know the people, places, things, and activities that make you want to smoke (triggers). Avoid them.  What first steps can I take to quit smoking?  · Throw away all cigarettes at home, at work, and in your car.  · Throw away the things that you use when you smoke, such as ashtrays and lighters.  · Clean your car. Make sure to empty the ashtray.  · Clean your home, including curtains and carpets.  What can I do to help me quit smoking?  Talk with your doctor about taking medicines and seeing a counselor at the same time. You are more likely to succeed when you do both.  · If you are pregnant or breastfeeding, talk with your doctor about counseling or other ways to quit smoking. Do not take medicine to help you quit smoking unless your doctor tells you to do so.  To quit smoking:  Quit right away  · Quit smoking totally, instead of slowly cutting back on how much you smoke over a period of time.  · Go to counseling. You are more likely to quit if you go to counseling sessions regularly.  Take medicine  You may take medicines to help you quit. Some  medicines need a prescription, and some you can buy over-the-counter. Some medicines may contain a drug called nicotine to replace the nicotine in cigarettes. Medicines may:  · Help you to stop having the desire to smoke (cravings).  · Help to stop the problems that come when you stop smoking (withdrawal symptoms).  Your doctor may ask you to use:  · Nicotine patches, gum, or lozenges.  · Nicotine inhalers or sprays.  · Non-nicotine medicine that is taken by mouth.  Find resources  Find resources and other ways to help you quit smoking and remain smoke-free after you quit. These resources are most helpful when you use them often. They include:  · Online chats with a counselor.  · Phone quitlines.  · Printed self-help materials.  · Support groups or group counseling.  · Text messaging programs.  · Mobile phone apps. Use apps on your mobile phone or tablet that can help you stick to your quit plan. There are many free apps for mobile phones and tablets as well as websites. Examples include Quit Guide from the CDC and smokefree.gov    What things can I do to make it easier to quit?    · Talk to your family and friends. Ask them to support and encourage you.  · Call a phone quitline (7-566-QUITNOW), reach out to support groups, or work with a counselor.  · Ask people who smoke to not smoke around you.  · Avoid places that make you want to smoke, such as:  ? Bars.  ? Parties.  ? Smoke-break areas at work.  · Spend time with people who do not smoke.  · Lower the stress in your life. Stress can make you want to smoke. Try these things to help your stress:  ? Getting regular exercise.  ? Doing deep-breathing exercises.  ? Doing yoga.  ? Meditating.  ? Doing a body scan. To do this, close your eyes, focus on one area of your body at a time from head to toe. Notice which parts of your body are tense. Try to relax the muscles in those areas.  How will I feel when I quit smoking?  Day 1 to 3 weeks  Within the first 24 hours,  you may start to have some problems that come from quitting tobacco. These problems are very bad 2-3 days after you quit, but they do not often last for more than 2-3 weeks. You may get these symptoms:  · Mood swings.  · Feeling restless, nervous, angry, or annoyed.  · Trouble concentrating.  · Dizziness.  · Strong desire for high-sugar foods and nicotine.  · Weight gain.  · Trouble pooping (constipation).  · Feeling like you may vomit (nausea).  · Coughing or a sore throat.  · Changes in how the medicines that you take for other issues work in your body.  · Depression.  · Trouble sleeping (insomnia).  Week 3 and afterward  After the first 2-3 weeks of quitting, you may start to notice more positive results, such as:  · Better sense of smell and taste.  · Less coughing and sore throat.  · Slower heart rate.  · Lower blood pressure.  · Clearer skin.  · Better breathing.  · Fewer sick days.  Quitting smoking can be hard. Do not give up if you fail the first time. Some people need to try a few times before they succeed. Do your best to stick to your quit plan, and talk with your doctor if you have any questions or concerns.  Summary  · Smoking tobacco is the leading cause of preventable death. Quitting smoking can be hard, but it is one of the best things that you can do for your health.  · When you decide to quit smoking, make a plan to help you succeed.  · Quit smoking right away, not slowly over a period of time.  · When you start quitting, seek help from your doctor, family, or friends.  This information is not intended to replace advice given to you by your health care provider. Make sure you discuss any questions you have with your health care provider.  Document Revised: 09/11/2020 Document Reviewed: 03/07/2020  [email or web address] Patient Education © 2021 [email or web address]  Heart-Healthy Eating Plan  Heart-healthy meal planning includes:  · Eating less unhealthy fats.  · Eating more healthy fats.  · Making other changes in  your diet.  Talk with your doctor or a diet specialist (dietitian) to create an eating plan that is right for you.  What is my plan?  Your doctor may recommend an eating plan that includes:  · Total fat: ______% or less of total calories a day.  · Saturated fat: ______% or less of total calories a day.  · Cholesterol: less than _________mg a day.  What are tips for following this plan?  Cooking  Avoid frying your food. Try to bake, boil, grill, or broil it instead. You can also reduce fat by:  · Removing the skin from poultry.  · Removing all visible fats from meats.  · Steaming vegetables in water or broth.  Meal planning    · At meals, divide your plate into four equal parts:  ? Fill one-half of your plate with vegetables and green salads.  ? Fill one-fourth of your plate with whole grains.  ? Fill one-fourth of your plate with lean protein foods.  · Eat 4-5 servings of vegetables per day. A serving of vegetables is:  ? 1 cup of raw or cooked vegetables.  ? 2 cups of raw leafy greens.  · Eat 4-5 servings of fruit per day. A serving of fruit is:  ? 1 medium whole fruit.  ? ¼ cup of dried fruit.  ? ½ cup of fresh, frozen, or canned fruit.  ? ½ cup of 100% fruit juice.  · Eat more foods that have soluble fiber. These are apples, broccoli, carrots, beans, peas, and barley. Try to get 20-30 g of fiber per day.  · Eat 4-5 servings of nuts, legumes, and seeds per week:  ? 1 serving of dried beans or legumes equals ½ cup after being cooked.  ? 1 serving of nuts is ¼ cup.  ? 1 serving of seeds equals 1 tablespoon.  General information  · Eat more home-cooked food. Eat less restaurant, buffet, and fast food.  · Limit or avoid alcohol.  · Limit foods that are high in starch and sugar.  · Avoid fried foods.  · Lose weight if you are overweight.  · Keep track of how much salt (sodium) you eat. This is important if you have high blood pressure. Ask your doctor to tell you more about this.  · Try to add vegetarian meals each  week.  Fats  · Choose healthy fats. These include olive oil and canola oil, flaxseeds, walnuts, almonds, and seeds.  · Eat more omega-3 fats. These include salmon, mackerel, sardines, tuna, flaxseed oil, and ground flaxseeds. Try to eat fish at least 2 times each week.  · Check food labels. Avoid foods with trans fats or high amounts of saturated fat.  · Limit saturated fats.  ? These are often found in animal products, such as meats, butter, and cream.  ? These are also found in plant foods, such as palm oil, palm kernel oil, and coconut oil.  · Avoid foods with partially hydrogenated oils in them. These have trans fats. Examples are stick margarine, some tub margarines, cookies, crackers, and other baked goods.  What foods can I eat?  Fruits  All fresh, canned (in natural juice), or frozen fruits.  Vegetables  Fresh or frozen vegetables (raw, steamed, roasted, or grilled). Green salads.  Grains  Most grains. Choose whole wheat and whole grains most of the time. Rice and pasta, including brown rice and pastas made with whole wheat.  Meats and other proteins  Lean, well-trimmed beef, veal, pork, and lamb. Chicken and turkey without skin. All fish and shellfish. Wild duck, rabbit, pheasant, and venison. Egg whites or low-cholesterol egg substitutes. Dried beans, peas, lentils, and tofu. Seeds and most nuts.  Dairy  Low-fat or nonfat cheeses, including ricotta and mozzarella. Skim or 1% milk that is liquid, powdered, or evaporated. Buttermilk that is made with low-fat milk. Nonfat or low-fat yogurt.  Fats and oils  Non-hydrogenated (trans-free) margarines. Vegetable oils, including soybean, sesame, sunflower, olive, peanut, safflower, corn, canola, and cottonseed. Salad dressings or mayonnaise made with a vegetable oil.  Beverages  Mineral water. Coffee and tea. Diet carbonated beverages.  Sweets and desserts  Sherbet, gelatin, and fruit ice. Small amounts of dark chocolate.  Limit all sweets and desserts.  Seasonings  and condiments  All seasonings and condiments.  The items listed above may not be a complete list of foods and drinks you can eat. Contact a dietitian for more options.  What foods should I avoid?  Fruits  Canned fruit in heavy syrup. Fruit in cream or butter sauce. Fried fruit. Limit coconut.  Vegetables  Vegetables cooked in cheese, cream, or butter sauce. Fried vegetables.  Grains  Breads that are made with saturated or trans fats, oils, or whole milk. Croissants. Sweet rolls. Donuts. High-fat crackers, such as cheese crackers.  Meats and other proteins  Fatty meats, such as hot dogs, ribs, sausage, alfonso, rib-eye roast or steak. High-fat deli meats, such as salami and bologna. Caviar. Domestic duck and goose. Organ meats, such as liver.  Dairy  Cream, sour cream, cream cheese, and creamed cottage cheese. Whole-milk cheeses. Whole or 2% milk that is liquid, evaporated, or condensed. Whole buttermilk. Cream sauce or high-fat cheese sauce. Yogurt that is made from whole milk.  Fats and oils  Meat fat, or shortening. Cocoa butter, hydrogenated oils, palm oil, coconut oil, palm kernel oil. Solid fats and shortenings, including alfonso fat, salt pork, lard, and butter. Nondairy cream substitutes. Salad dressings with cheese or sour cream.  Beverages  Regular sodas and juice drinks with added sugar.  Sweets and desserts  Frosting. Pudding. Cookies. Cakes. Pies. Milk chocolate or white chocolate. Buttered syrups. Full-fat ice cream or ice cream drinks.  The items listed above may not be a complete list of foods and drinks to avoid. Contact a dietitian for more information.  Summary  · Heart-healthy meal planning includes eating less unhealthy fats, eating more healthy fats, and making other changes in your diet.  · Eat a balanced diet. This includes fruits and vegetables, low-fat or nonfat dairy, lean protein, nuts and legumes, whole grains, and heart-healthy oils and fats.  This information is not intended to replace  advice given to you by your health care provider. Make sure you discuss any questions you have with your health care provider.  Document Revised: 02/21/2019 Document Reviewed: 01/25/2019  Elsevier Patient Education © 2021 [employer]

## 2021-07-08 ENCOUNTER — HOSPITAL ENCOUNTER (OUTPATIENT)
Dept: CARDIOLOGY | Facility: HOSPITAL | Age: 46
Discharge: HOME | End: 2021-07-08

## 2021-07-08 VITALS — WEIGHT: 210.1 LBS | BODY MASS INDEX: 31.12 KG/M2 | HEIGHT: 69 IN

## 2021-07-08 DIAGNOSIS — I20.8 STABLE ANGINA PECTORIS (HCC): ICD-10-CM

## 2021-07-08 DIAGNOSIS — R06.02 SHORTNESS OF BREATH: ICD-10-CM

## 2021-07-08 DIAGNOSIS — I25.10 CORONARY ARTERY DISEASE INVOLVING NATIVE CORONARY ARTERY OF NATIVE HEART, ANGINA PRESENCE UNSPECIFIED: ICD-10-CM

## 2021-07-08 DIAGNOSIS — I25.5 ISCHEMIC CARDIOMYOPATHY: ICD-10-CM

## 2021-07-08 PROCEDURE — 93306 TTE W/DOPPLER COMPLETE: CPT

## 2021-07-08 PROCEDURE — A9500 TC99M SESTAMIBI: HCPCS | Performed by: INTERNAL MEDICINE

## 2021-07-08 PROCEDURE — 93017 CV STRESS TEST TRACING ONLY: CPT

## 2021-07-08 PROCEDURE — 78452 HT MUSCLE IMAGE SPECT MULT: CPT

## 2021-07-08 PROCEDURE — 93018 CV STRESS TEST I&R ONLY: CPT | Performed by: INTERNAL MEDICINE

## 2021-07-08 PROCEDURE — 25010000002 REGADENOSON 0.4 MG/5ML SOLUTION: Performed by: INTERNAL MEDICINE

## 2021-07-08 PROCEDURE — 0 TECHNETIUM SESTAMIBI: Performed by: INTERNAL MEDICINE

## 2021-07-08 PROCEDURE — 78452 HT MUSCLE IMAGE SPECT MULT: CPT | Performed by: INTERNAL MEDICINE

## 2021-07-08 PROCEDURE — 93306 TTE W/DOPPLER COMPLETE: CPT | Performed by: INTERNAL MEDICINE

## 2021-07-08 RX ADMIN — TECHNETIUM TC 99M SESTAMIBI 1 DOSE: 1 INJECTION INTRAVENOUS at 11:30

## 2021-07-08 RX ADMIN — REGADENOSON 0.4 MG: 0.08 INJECTION, SOLUTION INTRAVENOUS at 11:30

## 2021-07-08 RX ADMIN — TECHNETIUM TC 99M SESTAMIBI 1 DOSE: 1 INJECTION INTRAVENOUS at 08:53

## 2021-07-12 LAB
BH CV REST NUCLEAR ISOTOPE DOSE: 10 MCI
BH CV STRESS COMMENTS STAGE 1: NORMAL
BH CV STRESS DOSE REGADENOSON STAGE 1: 0.4
BH CV STRESS DURATION MIN STAGE 1: 0
BH CV STRESS DURATION SEC STAGE 1: 10
BH CV STRESS NUCLEAR ISOTOPE DOSE: 30 MCI
BH CV STRESS PROTOCOL 1: NORMAL
BH CV STRESS RECOVERY BP: NORMAL MMHG
BH CV STRESS RECOVERY HR: 103 BPM
BH CV STRESS STAGE 1: 1
MAXIMAL PREDICTED HEART RATE: 172 BPM
PERCENT MAX PREDICTED HR: 70.35 %
STRESS BASELINE BP: NORMAL MMHG
STRESS BASELINE HR: 93 BPM
STRESS PERCENT HR: 83 %
STRESS POST PEAK BP: NORMAL MMHG
STRESS POST PEAK HR: 121 BPM
STRESS TARGET HR: 146 BPM

## 2021-07-12 NOTE — PROGRESS NOTES
Please advise patient. Post stress ef is actually better than prior stress test 2020.  Have him f/u with [facility] in 1 week.

## 2021-07-13 ENCOUNTER — TELEPHONE (OUTPATIENT)
Dept: CARDIOLOGY | Facility: CLINIC | Age: 46
End: 2021-07-13

## 2021-07-13 NOTE — TELEPHONE ENCOUNTER
----- Message from SAIGE Rachel sent at 7/12/2021 10:09 AM EDT -----  Please advise patient. Post stress ef is actually better than prior stress test 2020.  Have him f/u with [facility] in 1 week.

## 2021-07-13 NOTE — TELEPHONE ENCOUNTER
Spoke with patient regarding results. Sooner appointment made 07/14/21 @ 1500 with [facility]. Patient aware of appointment and has no further questions at this time. KARISSA, CMA

## 2021-07-14 ENCOUNTER — OFFICE VISIT (OUTPATIENT)
Dept: CARDIOLOGY | Facility: CLINIC | Age: 46
End: 2021-07-14

## 2021-07-14 VITALS
HEART RATE: 116 BPM | WEIGHT: 211.4 LBS | BODY MASS INDEX: 31.31 KG/M2 | SYSTOLIC BLOOD PRESSURE: 107 MMHG | DIASTOLIC BLOOD PRESSURE: 82 MMHG | OXYGEN SATURATION: 99 % | HEIGHT: 69 IN

## 2021-07-14 DIAGNOSIS — I25.10 CORONARY ARTERY DISEASE INVOLVING NATIVE CORONARY ARTERY OF NATIVE HEART, ANGINA PRESENCE UNSPECIFIED: Primary | ICD-10-CM

## 2021-07-14 DIAGNOSIS — R06.02 SHORTNESS OF BREATH: ICD-10-CM

## 2021-07-14 DIAGNOSIS — I25.5 ISCHEMIC CARDIOMYOPATHY: ICD-10-CM

## 2021-07-14 DIAGNOSIS — R94.39 ABNORMAL NUCLEAR STRESS TEST: ICD-10-CM

## 2021-07-14 PROCEDURE — 99214 OFFICE O/P EST MOD 30 MIN: CPT | Performed by: PHYSICIAN ASSISTANT

## 2021-07-14 NOTE — PROGRESS NOTES
Problem list     Subjective   Navi Hanley is a 46 y.o. male     Chief Complaint   Patient presents with   • Follow-up     ABN testing    Problem list  1.  Coronary artery disease  1.1 cardiac catheterization April 2019 demonstrated high-grade three-vessel disease with an EF of 30% with recommendations for coronary artery bypass grafting  1.2 coronary artery bypass grafting, three-vessel, April 2019 per patient report, inadequate data  1.3.  Stress test 7/20: Severely depressed post stress ejection fraction of 27% with global hypokinesis and basilar inferior lateral lateral akinesis positive scintigraphic the defect compatible with a relatively large posterior lateral infarct and mild to moderate pacheco-infarct ischemia  1.4 left heart catheterization 8/20: Patient with occluded LAD but patent LIMA to LAD and patent vein graft to the diagonal.  Nonobstructive circumflex and RCA.  PTCA of the diagonal for medically refractory angina but residual stenosis noted.  Stenting was not performed because of fear of jeopardizing ongoing grafted vessel.  Medical meds recommended   2.  Ischemic cardiomyopathy posterior lateral infarct with mild to moderate pacheco-infarct ischemia post-rest ejection fraction of 27%  2.1 EF estimated 30% by catheterization April 2019  2.2 echocardiogram 7/20: Mild dilation of left ventricle, mildly to moderately reduced ejection fraction of 40 to 45%, borderline concentric left ventricular hypertrophy grade 1 diastolic dysfunction mild left atrial enlargement  2.3 most recent evaluation of EF with echocardiogram January 2021 estimated at 45 to 50%  3.  Dyslipidemia                    4.  Chronic tobacco use    HPI  The patient presents back today to review study results.  Because of increasing symptoms felt compatible with angina/anginal equivalent complications at last evaluation, he was scheduled for testing.  Stress test supports inferolateral infarct with moderate pacheco-infarct ischemia.  There  was ischemia also involving the mid anterior wall.  Post stress EF was significantly reduced at 36%.  Echo preliminary only is available today.  Clinically, the patient still has chest tightness.  He has ongoing dyspnea and fatigue which can be fairly significant at times.  He denies failure or dysrhythmic symptoms.  He does feel that symptoms are progressing and are now limiting.  He is concerned with symptoms and presents today to discuss the potential of further evaluation.    Current Outpatient Medications on File Prior to Visit   Medication Sig Dispense Refill   • ASPIRIN LOW DOSE 81 MG EC tablet Take 81 mg by mouth Daily.     • atorvastatin (LIPITOR) 80 MG tablet Take 80 mg by mouth Daily.     • cetirizine (zyrTEC) 10 MG tablet Take 10 mg by mouth Daily.     • clopidogrel (PLAVIX) 75 MG tablet TAKE 1 TABLET BY MOUTH ONCE DAILY 30 tablet 10   • cyclobenzaprine (FLEXERIL) 10 MG tablet TAKE 1 TABLET BY MOUTH TWO TIMES A DAY 60 tablet 1   • furosemide (LASIX) 20 MG tablet Take 20 mg by mouth Daily.     • MAPAP 325 MG tablet 325 mg As Needed.     • naproxen (NAPROSYN) 500 MG tablet TAKE 1 TABLET BY MOUTH TWO TIMES A DAY WITH MEALS 60 tablet 0   • nitroglycerin (NITROSTAT) 0.4 MG SL tablet 1 under the tongue as needed for angina, may repeat q5mins for up three doses 100 tablet 11   • potassium chloride (K-DUR) 10 MEQ CR tablet 2 (Two) Times a Day.     • ranolazine (Ranexa) 500 MG 12 hr tablet Take 1 tablet by mouth 2 (Two) Times a Day. 60 tablet 11   • sacubitril-valsartan (Entresto) 24-26 MG tablet Take 1 tablet by mouth 2 (Two) Times a Day. 60 tablet 11   • SENNA PLUS 8.6-50 MG per tablet 2 (Two) Times a Day.       No current facility-administered medications on file prior to visit.       Patient has no known allergies.    Past Medical History:   Diagnosis Date   • Coronary artery disease    • Hyperlipidemia    • Hypertension    • Myocardial infarction (CMS/[facility])        Social History     Socioeconomic History   •  "Marital status:      Spouse name: Not on file   • Number of children: Not on file   • Years of education: Not on file   • Highest education level: Not on file   Tobacco Use   • Smoking status: Current Every Day Smoker     Packs/day: 1.00     Years: 30.00     Pack years: 30.00     Types: Cigarettes   • Smokeless tobacco: Former User   Substance and Sexual Activity   • Alcohol use: No   • Drug use: No   • Sexual activity: Defer       Family History   Problem Relation Age of Onset   • Heart disease Mother    • Cancer Mother    • Hyperlipidemia Father    • Hypertension Father        Review of Systems   Constitutional: Positive for fatigue. Negative for chills and fever.   HENT: Negative for congestion, rhinorrhea and sore throat.    Eyes: Negative.  Negative for visual disturbance.   Respiratory: Positive for chest tightness and shortness of breath. Negative for wheezing.    Cardiovascular: Positive for chest pain. Negative for palpitations and leg swelling.   Gastrointestinal: Negative.    Endocrine: Negative.    Genitourinary: Negative.    Musculoskeletal: Negative.  Negative for arthralgias, back pain and neck pain.   Skin: Negative.  Negative for rash and wound.   Allergic/Immunologic: Negative.  Negative for environmental allergies.   Neurological: Positive for numbness (hands ) and headaches. Negative for dizziness and weakness.   Hematological: Bruises/bleeds easily (bruises/ bleeds).   Psychiatric/Behavioral: Positive for sleep disturbance (falling / staying asleep ).       Objective   Vitals:    07/14/21 1511   BP: 107/82   BP Location: Left arm   Patient Position: Sitting   Pulse: 116   SpO2: 99%   Weight: 95.9 kg (211 lb 6.4 oz)   Height: 175 cm (68.9\")      /82 (BP Location: Left arm, Patient Position: Sitting)   Pulse 116   Ht 175 cm (68.9\")   Wt 95.9 kg (211 lb 6.4 oz)   SpO2 99%   BMI 31.31 kg/m²    Lab Results (most recent)     None        Physical Exam  Vitals and nursing note " reviewed.   Constitutional:       General: He is not in acute distress.     Appearance: He is well-developed.   HENT:      Head: Normocephalic and atraumatic.   Eyes:      Conjunctiva/sclera: Conjunctivae normal.      Pupils: Pupils are equal, round, and reactive to light.   Neck:      Vascular: No JVD.      Trachea: No tracheal deviation.   Cardiovascular:      Rate and Rhythm: Normal rate and regular rhythm.      Heart sounds: Normal heart sounds.   Pulmonary:      Effort: Pulmonary effort is normal.      Breath sounds: Normal breath sounds.   Abdominal:      General: Bowel sounds are normal. There is no distension.      Palpations: Abdomen is soft. There is no mass.      Tenderness: There is no abdominal tenderness. There is no guarding or rebound.   Musculoskeletal:         General: No tenderness or deformity. Normal range of motion.      Cervical back: Normal range of motion and neck supple.   Skin:     General: Skin is warm and dry.      Coloration: Skin is not pale.      Findings: No erythema or rash.   Neurological:      Mental Status: He is alert and oriented to person, place, and time.   Psychiatric:         Behavior: Behavior normal.         Thought Content: Thought content normal.         Judgment: Judgment normal.           Procedure   Procedures       Assessment/Plan      Diagnosis Plan   1. Coronary artery disease involving native coronary artery of native heart, angina presence unspecified  [facility] Cath    CBC & Differential    Basic Metabolic Panel   2. Shortness of breath  [facility] Cath    CBC & Differential    Basic Metabolic Panel   3. Ischemic cardiomyopathy  [facility] Cath    CBC & Differential    Basic Metabolic Panel   4. Abnormal nuclear stress test  [facility]    CBC & Differential    Basic Metabolic Panel     1.  The patient presents today to review stress test findings.  He does have evidence of infarct pattern, with associated moderate pacheco-infarct  ischemia.  He also has anterior wall ischemia.  He has significant reduction in post stress systolic function.  With ongoing symptoms felt to represent anginal equivalent symptoms, coupled with his abnormal stress test, I feel that we need to repeat catheterization to further define graft and native coronary anatomy.    2.  I would continue medications at this time.  He will continue dual antiplatelet therapy, statin therapy, and his regimen otherwise.    3.  He will need laboratories in the precath setting.    4.  We will see him as soon as we know results of catheterization and can recommend him further at that time.  He will call for any issues prior to cath or prior to future follow-ups.  Further pending results of catheterization.                   Electronically signed by:

## 2021-07-14 NOTE — PATIENT INSTRUCTIONS

## 2021-07-24 LAB
AORTIC DIMENSIONLESS INDEX: 0.6 (DI)
BH CV ECHO MEAS - ACS: 1.6 CM
BH CV ECHO MEAS - AO MAX PG (FULL): 3.3 MMHG
BH CV ECHO MEAS - AO MAX PG: 5.4 MMHG
BH CV ECHO MEAS - AO MEAN PG (FULL): 2 MMHG
BH CV ECHO MEAS - AO MEAN PG: 3 MMHG
BH CV ECHO MEAS - AO ROOT AREA (BSA CORRECTED): 1.5
BH CV ECHO MEAS - AO ROOT AREA: 7.5 CM^2
BH CV ECHO MEAS - AO ROOT DIAM: 3.1 CM
BH CV ECHO MEAS - AO V2 MAX: 116 CM/SEC
BH CV ECHO MEAS - AO V2 MEAN: 74.5 CM/SEC
BH CV ECHO MEAS - AO V2 VTI: 19.4 CM
BH CV ECHO MEAS - BSA(HAYCOCK): 2.2 M^2
BH CV ECHO MEAS - BSA: 2.1 M^2
BH CV ECHO MEAS - BZI_BMI: 31 KILOGRAMS/M^2
BH CV ECHO MEAS - BZI_METRIC_HEIGHT: 175.3 CM
BH CV ECHO MEAS - BZI_METRIC_WEIGHT: 95.3 KG
BH CV ECHO MEAS - EDV(CUBED): 68.4 ML
BH CV ECHO MEAS - EDV(TEICH): 73.8 ML
BH CV ECHO MEAS - EF(CUBED): 61.7 %
BH CV ECHO MEAS - EF(MOD-BP): 53 %
BH CV ECHO MEAS - EF(TEICH): 53.7 %
BH CV ECHO MEAS - EF_3D-VOL: 55 %
BH CV ECHO MEAS - ESV(CUBED): 26.2 ML
BH CV ECHO MEAS - ESV(TEICH): 34.2 ML
BH CV ECHO MEAS - FS: 27.4 %
BH CV ECHO MEAS - IVS/LVPW: 1
BH CV ECHO MEAS - IVSD: 1 CM
BH CV ECHO MEAS - LA DIMENSION: 3.5 CM
BH CV ECHO MEAS - LA/AO: 1.1
BH CV ECHO MEAS - LAT PEAK E' VEL: 9.1 CM/SEC
BH CV ECHO MEAS - LV IVRT: 0.09 SEC
BH CV ECHO MEAS - LV MASS(C)D: 139.1 GRAMS
BH CV ECHO MEAS - LV MASS(C)DI: 66 GRAMS/M^2
BH CV ECHO MEAS - LV MAX PG: 2.1 MMHG
BH CV ECHO MEAS - LV MEAN PG: 1 MMHG
BH CV ECHO MEAS - LV V1 MAX: 72.5 CM/SEC
BH CV ECHO MEAS - LV V1 MEAN: 43.1 CM/SEC
BH CV ECHO MEAS - LV V1 VTI: 13.5 CM
BH CV ECHO MEAS - LVIDD: 4.1 CM
BH CV ECHO MEAS - LVIDS: 3 CM
BH CV ECHO MEAS - LVPWD: 1 CM
BH CV ECHO MEAS - MED PEAK E' VEL: 7.1 CM/SEC
BH CV ECHO MEAS - MR MAX PG: 59.6 MMHG
BH CV ECHO MEAS - MR MAX VEL: 386 CM/SEC
BH CV ECHO MEAS - MV A MAX VEL: 55.5 CM/SEC
BH CV ECHO MEAS - MV DEC SLOPE: 340 CM/SEC^2
BH CV ECHO MEAS - MV DEC TIME: 0.2 SEC
BH CV ECHO MEAS - MV E MAX VEL: 57.6 CM/SEC
BH CV ECHO MEAS - MV E/A: 1
BH CV ECHO MEAS - MV MAX PG: 1.7 MMHG
BH CV ECHO MEAS - MV MEAN PG: 1 MMHG
BH CV ECHO MEAS - MV P1/2T MAX VEL: 81.1 CM/SEC
BH CV ECHO MEAS - MV P1/2T: 69.9 MSEC
BH CV ECHO MEAS - MV V2 MAX: 66 CM/SEC
BH CV ECHO MEAS - MV V2 MEAN: 46.4 CM/SEC
BH CV ECHO MEAS - MV V2 VTI: 18.7 CM
BH CV ECHO MEAS - MVA P1/2T LCG: 2.7 CM^2
BH CV ECHO MEAS - MVA(P1/2T): 3.1 CM^2
BH CV ECHO MEAS - PA MAX PG (FULL): 3.1 MMHG
BH CV ECHO MEAS - PA MAX PG: 5.1 MMHG
BH CV ECHO MEAS - PA MEAN PG (FULL): 1.5 MMHG
BH CV ECHO MEAS - PA MEAN PG: 2.5 MMHG
BH CV ECHO MEAS - PA V2 MAX: 112.5 CM/SEC
BH CV ECHO MEAS - PA V2 MEAN: 75.7 CM/SEC
BH CV ECHO MEAS - PA V2 VTI: 20.4 CM
BH CV ECHO MEAS - RV MAX PG: 1.9 MMHG
BH CV ECHO MEAS - RV MEAN PG: 1 MMHG
BH CV ECHO MEAS - RV V1 MAX: 69.8 CM/SEC
BH CV ECHO MEAS - RV V1 MEAN: 45 CM/SEC
BH CV ECHO MEAS - RV V1 VTI: 14.3 CM
BH CV ECHO MEAS - RVDD: 3 CM
BH CV ECHO MEAS - SI(AO): 69.4 ML/M^2
BH CV ECHO MEAS - SI(CUBED): 20 ML/M^2
BH CV ECHO MEAS - SI(TEICH): 18.8 ML/M^2
BH CV ECHO MEAS - SV(AO): 146.4 ML
BH CV ECHO MEAS - SV(CUBED): 42.2 ML
BH CV ECHO MEAS - SV(TEICH): 39.6 ML
BH CV ECHO MEASUREMENTS AVERAGE E/E' RATIO: 7.11
MAXIMAL PREDICTED HEART RATE: 172 BPM
SINUS: 3.3 CM
STRESS TARGET HR: 146 BPM

## 2021-08-23 ENCOUNTER — OUTSIDE FACILITY SERVICE (OUTPATIENT)
Dept: CARDIOLOGY | Facility: CLINIC | Age: 46
End: 2021-08-23

## 2021-08-23 DIAGNOSIS — I25.10 CORONARY ARTERY DISEASE INVOLVING NATIVE CORONARY ARTERY OF NATIVE HEART, ANGINA PRESENCE UNSPECIFIED: ICD-10-CM

## 2021-08-23 DIAGNOSIS — R06.02 SHORTNESS OF BREATH: ICD-10-CM

## 2021-08-23 DIAGNOSIS — R94.39 ABNORMAL NUCLEAR STRESS TEST: ICD-10-CM

## 2021-08-23 DIAGNOSIS — I25.5 ISCHEMIC CARDIOMYOPATHY: ICD-10-CM

## 2021-08-23 PROCEDURE — 93459 L HRT ART/GRFT ANGIO: CPT | Performed by: INTERNAL MEDICINE

## 2021-08-26 DIAGNOSIS — I50.22 CHRONIC SYSTOLIC CONGESTIVE HEART FAILURE (HCC): ICD-10-CM

## 2021-08-26 DIAGNOSIS — R94.30 CARDIAC LV EJECTION FRACTION 30-35%: ICD-10-CM

## 2021-08-26 DIAGNOSIS — R07.89 CHEST WALL PAIN: ICD-10-CM

## 2021-08-26 DIAGNOSIS — I25.5 ISCHEMIC CARDIOMYOPATHY: ICD-10-CM

## 2021-08-26 RX ORDER — CYCLOBENZAPRINE HCL 10 MG
TABLET ORAL
Qty: 60 TABLET | Refills: 11 | OUTPATIENT
Start: 2021-08-26

## 2021-08-26 RX ORDER — SACUBITRIL AND VALSARTAN 24; 26 MG/1; MG/1
TABLET, FILM COATED ORAL
Qty: 60 TABLET | Refills: 11 | Status: SHIPPED | OUTPATIENT
Start: 2021-08-26 | End: 2021-09-27

## 2021-09-02 ENCOUNTER — LAB (OUTPATIENT)
Dept: LAB | Facility: HOSPITAL | Age: 46
End: 2021-09-02

## 2021-09-02 ENCOUNTER — OFFICE VISIT (OUTPATIENT)
Dept: CARDIOLOGY | Facility: CLINIC | Age: 46
End: 2021-09-02

## 2021-09-02 VITALS
OXYGEN SATURATION: 99 % | HEIGHT: 69 IN | BODY MASS INDEX: 31.7 KG/M2 | WEIGHT: 214 LBS | HEART RATE: 134 BPM | SYSTOLIC BLOOD PRESSURE: 119 MMHG | DIASTOLIC BLOOD PRESSURE: 85 MMHG

## 2021-09-02 DIAGNOSIS — I20.8 STABLE ANGINA PECTORIS (HCC): ICD-10-CM

## 2021-09-02 DIAGNOSIS — R06.02 SHORTNESS OF BREATH: Primary | ICD-10-CM

## 2021-09-02 DIAGNOSIS — I25.10 CORONARY ARTERY DISEASE INVOLVING NATIVE CORONARY ARTERY OF NATIVE HEART, ANGINA PRESENCE UNSPECIFIED: ICD-10-CM

## 2021-09-02 DIAGNOSIS — R06.02 SHORTNESS OF BREATH: ICD-10-CM

## 2021-09-02 LAB — D DIMER PPP FEU-MCNC: 0.33 MCGFEU/ML (ref 0–0.5)

## 2021-09-02 PROCEDURE — 85379 FIBRIN DEGRADATION QUANT: CPT

## 2021-09-02 PROCEDURE — 93000 ELECTROCARDIOGRAM COMPLETE: CPT | Performed by: PHYSICIAN ASSISTANT

## 2021-09-02 PROCEDURE — 36415 COLL VENOUS BLD VENIPUNCTURE: CPT

## 2021-09-02 PROCEDURE — 99214 OFFICE O/P EST MOD 30 MIN: CPT | Performed by: PHYSICIAN ASSISTANT

## 2021-09-02 RX ORDER — DILTIAZEM HYDROCHLORIDE 120 MG/1
120 CAPSULE, EXTENDED RELEASE ORAL DAILY
Qty: 30 CAPSULE | Refills: 11 | Status: SHIPPED | OUTPATIENT
Start: 2021-09-02 | End: 2021-09-09 | Stop reason: DRUGHIGH

## 2021-09-02 NOTE — PATIENT INSTRUCTIONS

## 2021-09-02 NOTE — PROGRESS NOTES
Problem list     Subjective   Navi Hanley is a 46 y.o. male     Chief Complaint   Patient presents with   • Heart cath follow up     8/23/21 rt side groin, no new stenting   Problem list  1.  Coronary artery disease  1.1 cardiac catheterization April 2019 demonstrated high-grade three-vessel disease with an EF of 30% with recommendations for coronary artery bypass grafting  1.2 coronary artery bypass grafting, three-vessel, April 2019 per patient report, inadequate data  1.3.  Stress test 7/20: Severely depressed post stress ejection fraction of 27% with global hypokinesis and basilar inferior lateral lateral akinesis positive scintigraphic the defect compatible with a relatively large posterior lateral infarct and mild to moderate pacheco-infarct ischemia  1.4 left heart catheterization 8/20: Patient with occluded LAD but patent LIMA to LAD and patent vein graft to the diagonal.  Nonobstructive circumflex and RCA.  PTCA of the diagonal for medically refractory angina but residual stenosis noted.  Stenting was not performed because of fear of jeopardizing ongoing grafted vessel.  Medical meds recommended   1.5 cardiac catheterization August 2021 with patent grafts noted with native vessel disease with medical management recommended and an EF of 55%  2.  Ischemic cardiomyopathy posterior lateral infarct with mild to moderate pacheco-infarct ischemia post-rest ejection fraction of 27%  2.1 EF historically around 30%  2.2 most recent evaluation by echocardiogram and catheterization was normal at 50-55%  3.  Dyslipidemia  4.  Chronic tobacco use    HPI    Patient is a 46-year-old male that presents back to the office for follow-up from catheterization    Catheterization demonstrated stable anatomy with patent grafts and medical management recommended. EF appeared to be normal    He occasionally has chest discomfort and this seems to occur more when exerting and he still has moderate levels, at best, of dyspnea. This is  quite significant and he has been concerned. He does experience PND and orthopnea. Interestingly, end-diastolic pressure catheterization was normal    Patient does describe palpitating at times. He notices fluttering sensation at times. No complaints of dizziness presyncope or syncope. Otherwise is doing well    Current Outpatient Medications on File Prior to Visit   Medication Sig Dispense Refill   • ASPIRIN LOW DOSE 81 MG EC tablet Take 81 mg by mouth Daily.     • atorvastatin (LIPITOR) 80 MG tablet Take 80 mg by mouth Daily.     • cetirizine (zyrTEC) 10 MG tablet Take 10 mg by mouth Daily.     • clopidogrel (PLAVIX) 75 MG tablet TAKE 1 TABLET BY MOUTH ONCE DAILY 30 tablet 10   • cyclobenzaprine (FLEXERIL) 10 MG tablet TAKE 1 TABLET BY MOUTH TWO TIMES A DAY 60 tablet 1   • Entresto 24-26 MG tablet TAKE 1 TABLET BY MOUTH TWO TIMES A DAY 60 tablet 11   • furosemide (LASIX) 20 MG tablet Take 20 mg by mouth Daily.     • MAPAP 325 MG tablet 325 mg As Needed.     • naproxen (NAPROSYN) 500 MG tablet TAKE 1 TABLET BY MOUTH TWO TIMES A DAY WITH MEALS 60 tablet 0   • nitroglycerin (NITROSTAT) 0.4 MG SL tablet 1 under the tongue as needed for angina, may repeat q5mins for up three doses 100 tablet 11   • potassium chloride (K-DUR) 10 MEQ CR tablet 2 (Two) Times a Day.     • ranolazine (Ranexa) 500 MG 12 hr tablet Take 1 tablet by mouth 2 (Two) Times a Day. 60 tablet 11   • SENNA PLUS 8.6-50 MG per tablet 2 (Two) Times a Day.       No current facility-administered medications on file prior to visit.       Patient has no known allergies.    Past Medical History:   Diagnosis Date   • Coronary artery disease    • Hyperlipidemia    • Hypertension    • Myocardial infarction (CMS/[facility])        Social History     Socioeconomic History   • Marital status:      Spouse name: Not on file   • Number of children: Not on file   • Years of education: Not on file   • Highest education level: Not on file   Tobacco Use   • Smoking  "status: Current Every Day Smoker     Packs/day: 1.00     Years: 30.00     Pack years: 30.00     Types: Cigarettes   • Smokeless tobacco: Former User   Substance and Sexual Activity   • Alcohol use: No   • Drug use: No   • Sexual activity: Defer       Family History   Problem Relation Age of Onset   • Heart disease Mother    • Cancer Mother    • Hyperlipidemia Father    • Hypertension Father        Review of Systems   Constitutional: Positive for fatigue. Negative for chills and fever.   HENT: Negative.  Negative for congestion, sinus pressure and sore throat.    Eyes: Positive for visual disturbance (readers).   Respiratory: Positive for chest tightness and shortness of breath.    Cardiovascular: Positive for chest pain (with any activity) and palpitations (skipping). Negative for leg swelling.   Gastrointestinal: Negative.  Negative for abdominal pain, blood in stool, constipation, diarrhea, nausea and vomiting.   Endocrine: Negative.  Negative for cold intolerance and heat intolerance.   Genitourinary: Negative.  Negative for dysuria, frequency, hematuria and urgency.   Musculoskeletal: Positive for back pain. Negative for arthralgias and neck pain.   Skin: Positive for rash. Negative for wound.   Allergic/Immunologic: Negative.  Negative for environmental allergies and food allergies.   Neurological: Positive for dizziness (at times). Negative for syncope and light-headedness.   Hematological: Bruises/bleeds easily.   Psychiatric/Behavioral: Positive for sleep disturbance (wakes gasping for air at times, also wakes with cp at times).       Objective   Vitals:    09/02/21 1346   BP: 119/85   BP Location: Left arm   Patient Position: Sitting   Pulse: (!) 134   SpO2: 99%   Weight: 97.1 kg (214 lb)   Height: 175.3 cm (69\")      /85 (BP Location: Left arm, Patient Position: Sitting)   Pulse (!) 134   Ht 175.3 cm (69\")   Wt 97.1 kg (214 lb)   SpO2 99%   BMI 31.60 kg/m²     Lab Results (most recent)     None "          Physical Exam  Vitals and nursing note reviewed.   Constitutional:       General: He is not in acute distress.     Appearance: Normal appearance. He is well-developed.   HENT:      Head: Normocephalic and atraumatic.   Eyes:      General: No scleral icterus.        Right eye: No discharge.         Left eye: No discharge.      Conjunctiva/sclera: Conjunctivae normal.   Neck:      Vascular: No carotid bruit.   Cardiovascular:      Rate and Rhythm: Normal rate and regular rhythm.      Heart sounds: Normal heart sounds. No murmur heard.   No friction rub. No gallop.    Pulmonary:      Effort: Pulmonary effort is normal. No respiratory distress.      Breath sounds: Normal breath sounds. No wheezing or rales.   Chest:      Chest wall: No tenderness.   Musculoskeletal:      Right lower leg: No edema.      Left lower leg: No edema.   Skin:     General: Skin is warm and dry.      Coloration: Skin is not pale.      Findings: No erythema or rash.   Neurological:      Mental Status: He is alert and oriented to person, place, and time.      Cranial Nerves: No cranial nerve deficit.   Psychiatric:         Behavior: Behavior normal.         Procedure     ECG 12 Lead    Date/Time: 9/2/2021 2:17 PM  Performed by: Bolivar Poon PA  Authorized by: Bolivar Poon PA   Comparison: not compared with previous ECG   Comments: EKG demonstrates sinus tachycardia 150 bpm with nonspecific ST abnormality at baseline               Assessment/Plan     Problems Addressed this Visit        Cardiac and Vasculature    Coronary artery disease involving native coronary artery of native heart    Relevant Medications    dilTIAZem XR (DILACOR XR) 120 MG 24 hr capsule    Other Relevant Orders    D-dimer, Quantitative    Chest pain    Relevant Orders    D-dimer, Quantitative       Pulmonary and Pneumonias    Shortness of breath - Primary    Relevant Orders    D-dimer, Quantitative      Diagnoses       Codes Comments    Shortness of breath     -  Primary ICD-10-CM: R06.02  ICD-9-CM: 786.05     Stable angina pectoris (CMS/[facility])     ICD-10-CM: I20.8  ICD-9-CM: 413.9     Coronary artery disease involving native coronary artery of native heart, angina presence unspecified     ICD-10-CM: I25.10  ICD-9-CM: 414.01           Recommendation  1. Patient with coronary artery disease with stable anatomy and preserved LV function. He has stable angina and dyspnea. His dyspnea discomfort still is quite significant. Patient is on antianginal therapy at this time. He has continued tachycardia. We will continue to have antianginal therapy. He is to call us if he has any worsening symptoms    2. I am ordering labs to rule out pulmonary or other noncardiac etiology.    3. Patient otherwise is stable. Any chest pain, resolved with nitroglycerin, recommend appointment as scheduled         Patient brought in medicine list to appointment, it's been reviewed with patient and med list was updated in the chart.       Electronically signed by:

## 2021-09-08 ENCOUNTER — TELEPHONE (OUTPATIENT)
Dept: CARDIOLOGY | Facility: CLINIC | Age: 46
End: 2021-09-08

## 2021-09-09 RX ORDER — DILTIAZEM HYDROCHLORIDE 60 MG/1
60 CAPSULE, EXTENDED RELEASE ORAL 2 TIMES DAILY
Qty: 60 CAPSULE | Refills: 3 | Status: SHIPPED | OUTPATIENT
Start: 2021-09-09 | End: 2021-09-30

## 2021-09-09 NOTE — TELEPHONE ENCOUNTER
Patient informed of D-dimer results. Patient stated he has been feeling better but blood pressure has been running low.   Friday 114/76 pulse 93  Saturday  86/72   Pulse 104  Sunday  87/68  Monday   97/74  Pulse 117  Tuesday 100/79  Pulse 101    Per Bolivar LIANG, patient to stop Diltiazem 120 xr daily. Start Diltiazem 60 mg po bid. Continue to monitor blood pressure. Patient verbal understanding. Tamika Abdul LPN

## 2021-09-09 NOTE — TELEPHONE ENCOUNTER
Bolivar Poon, Tamika Jiménez LPN  Let him know that lab test is normal.  Can we get a symptom check in regards to the new medication we started him on           Associated Results    D-dimer, Quantitative  Order: [id number]  Status:  Final result   Visible to patient:  No (not released) Dx:  Shortness of breath; Coronary artery ...  Specimen Information: Blood         1 Result Note     1 Follow-up Encounter  Component   Ref Range & Units 7 d ago   D-Dimer, Quantitative   0.00 - 0.50 MCGFEU/mL 0.33    Resulting Agency  [facility]      Narrative  Performed by:  [facility]  d-Dimer assay result is to be used in conjunction with a non-high clinical pretest probability (PTP) assessment tool to exclude PE and aid in diagnosis of VTE with cutoff of 0.5 MCGFEU/mL.      Specimen Collected: 09/02/21 14:14 Last Resulted: 09/02/21 19:12

## 2021-09-24 DIAGNOSIS — R07.89 CHEST WALL PAIN: ICD-10-CM

## 2021-09-24 DIAGNOSIS — I50.22 CHRONIC SYSTOLIC CONGESTIVE HEART FAILURE (HCC): ICD-10-CM

## 2021-09-24 DIAGNOSIS — I25.5 ISCHEMIC CARDIOMYOPATHY: ICD-10-CM

## 2021-09-24 DIAGNOSIS — R94.30 CARDIAC LV EJECTION FRACTION 30-35%: ICD-10-CM

## 2021-09-27 RX ORDER — SACUBITRIL AND VALSARTAN 24; 26 MG/1; MG/1
TABLET, FILM COATED ORAL
Qty: 60 TABLET | Refills: 0 | Status: SHIPPED | OUTPATIENT
Start: 2021-09-27 | End: 2022-01-04 | Stop reason: SDUPTHER

## 2021-09-27 RX ORDER — CYCLOBENZAPRINE HCL 10 MG
TABLET ORAL
Qty: 60 TABLET | Refills: 0 | Status: SHIPPED | OUTPATIENT
Start: 2021-09-27 | End: 2021-11-23

## 2021-09-30 ENCOUNTER — OFFICE VISIT (OUTPATIENT)
Dept: CARDIOLOGY | Facility: CLINIC | Age: 46
End: 2021-09-30

## 2021-09-30 VITALS
WEIGHT: 217 LBS | HEART RATE: 114 BPM | BODY MASS INDEX: 32.14 KG/M2 | DIASTOLIC BLOOD PRESSURE: 78 MMHG | HEIGHT: 69 IN | SYSTOLIC BLOOD PRESSURE: 108 MMHG | OXYGEN SATURATION: 99 %

## 2021-09-30 DIAGNOSIS — I25.5 ISCHEMIC CARDIOMYOPATHY: Primary | ICD-10-CM

## 2021-09-30 DIAGNOSIS — I25.10 CORONARY ARTERY DISEASE INVOLVING NATIVE CORONARY ARTERY OF NATIVE HEART, ANGINA PRESENCE UNSPECIFIED: ICD-10-CM

## 2021-09-30 DIAGNOSIS — R06.02 SHORTNESS OF BREATH: ICD-10-CM

## 2021-09-30 PROCEDURE — 99214 OFFICE O/P EST MOD 30 MIN: CPT | Performed by: PHYSICIAN ASSISTANT

## 2021-09-30 RX ORDER — METOPROLOL SUCCINATE 25 MG/1
25 TABLET, EXTENDED RELEASE ORAL DAILY
Qty: 30 TABLET | Refills: 11 | Status: SHIPPED | OUTPATIENT
Start: 2021-09-30 | End: 2022-01-04 | Stop reason: SDUPTHER

## 2021-09-30 NOTE — PATIENT INSTRUCTIONS

## 2021-09-30 NOTE — PROGRESS NOTES
Problem list     Subjective   Navi Hanley is a 46 y.o. male     Chief Complaint   Patient presents with   • Follow-up     Diltiazem 120 mg decreased on 9-9-21 to Diltiazem 60 mg po bid   Problem list  1.  Coronary artery disease  1.1 cardiac catheterization April 2019 demonstrated high-grade three-vessel disease with an EF of 30% with recommendations for coronary artery bypass grafting  1.2 coronary artery bypass grafting, three-vessel, April 2019 per patient report, inadequate data  1.3.  Stress test 7/20: Severely depressed post stress ejection fraction of 27% with global hypokinesis and basilar inferior lateral lateral akinesis positive scintigraphic the defect compatible with a relatively large posterior lateral infarct and mild to moderate pacheco-infarct ischemia  1.4 left heart catheterization 8/20: Patient with occluded LAD but patent LIMA to LAD and patent vein graft to the diagonal.  Nonobstructive circumflex and RCA.  PTCA of the diagonal for medically refractory angina but residual stenosis noted.  Stenting was not performed because of fear of jeopardizing ongoing grafted vessel.  Medical meds recommended   1.5 cardiac catheterization August 2021 with patent grafts noted with native vessel disease with medical management recommended and an EF of 55%  2.  Ischemic cardiomyopathy posterior lateral infarct with mild to moderate pacheco-infarct ischemia post-rest ejection fraction of 27%  2.1 EF historically around 30%  2.2 most recent evaluation by echocardiogram and catheterization was normal at 50-55%  3.  Dyslipidemia  4.  Chronic tobacco use       HPI    Patient is a 46-year-old male who presents back to the office for follow-up.  He has done well.  He feels improved since his last visit.  He had adjustment of medication last visit with [facility].  Patient has nonobstructive coronary disease by catheterization in August.  He has native vessel disease but patent grafts noted.  Patient has ischemic  cardiomyopathy in the past with most recent evaluation of 50 to 55%.    He occasionally feels slight discomfort but no severe or progressing just discomfort.  He has mild mild to moderate amount of dyspnea when exerting or doing activity but this appears to be stable.  He does not describe to me PND orthopnea.    He still feels palpitations at times but his heart rate is much improved.  He does not describe dizziness, presyncope or syncope.  D-dimer was ordered last visit to exclude PE which was negative        Current Outpatient Medications on File Prior to Visit   Medication Sig Dispense Refill   • ASPIRIN LOW DOSE 81 MG EC tablet Take 81 mg by mouth Daily.     • atorvastatin (LIPITOR) 80 MG tablet Take 80 mg by mouth Daily.     • cetirizine (zyrTEC) 10 MG tablet Take 10 mg by mouth Daily.     • clopidogrel (PLAVIX) 75 MG tablet TAKE 1 TABLET BY MOUTH ONCE DAILY 30 tablet 10   • cyclobenzaprine (FLEXERIL) 10 MG tablet TAKE 1 TABLET BY MOUTH TWO TIMES A DAY 60 tablet 0   • Entresto 24-26 MG tablet TAKE 1 TABLET BY MOUTH TWO TIMES A DAY 60 tablet 0   • furosemide (LASIX) 20 MG tablet Take 20 mg by mouth Daily.     • MAPAP 325 MG tablet 325 mg As Needed.     • naproxen (NAPROSYN) 500 MG tablet TAKE 1 TABLET BY MOUTH TWO TIMES A DAY WITH MEALS 60 tablet 0   • nitroglycerin (NITROSTAT) 0.4 MG SL tablet 1 under the tongue as needed for angina, may repeat q5mins for up three doses 100 tablet 11   • potassium chloride (K-DUR) 10 MEQ CR tablet 2 (Two) Times a Day.     • ranolazine (Ranexa) 500 MG 12 hr tablet Take 1 tablet by mouth 2 (Two) Times a Day. 60 tablet 11   • SENNA PLUS 8.6-50 MG per tablet 2 (Two) Times a Day.     • [DISCONTINUED] dilTIAZem SR (CARDIZEM SR) 60 MG 12 hr capsule Take 1 capsule by mouth 2 (Two) Times a Day. 60 capsule 3     No current facility-administered medications on file prior to visit.       Patient has no known allergies.    Past Medical History:   Diagnosis Date   • Coronary artery disease     • Hyperlipidemia    • Hypertension    • Myocardial infarction (CMS/[facility])        Social History     Socioeconomic History   • Marital status:      Spouse name: Not on file   • Number of children: Not on file   • Years of education: Not on file   • Highest education level: Not on file   Tobacco Use   • Smoking status: Current Every Day Smoker     Packs/day: 1.00     Years: 30.00     Pack years: 30.00     Types: Cigarettes   • Smokeless tobacco: Former User   Substance and Sexual Activity   • Alcohol use: No   • Drug use: No   • Sexual activity: Defer       Family History   Problem Relation Age of Onset   • Heart disease Mother    • Cancer Mother    • Hyperlipidemia Father    • Hypertension Father        Review of Systems   Constitutional: Positive for fatigue. Negative for chills and fever.   HENT: Negative for congestion, sinus pressure and sore throat.    Eyes: Negative.  Negative for visual disturbance (readers).   Respiratory: Positive for chest tightness (with activity) and shortness of breath.    Cardiovascular: Positive for chest pain (with activity), palpitations (skips at times) and leg swelling (at times).   Gastrointestinal: Negative.  Negative for abdominal pain, blood in stool, constipation, diarrhea, nausea and vomiting.   Endocrine: Negative.  Negative for cold intolerance and heat intolerance.   Genitourinary: Negative.  Negative for dysuria, frequency, hematuria and urgency.   Musculoskeletal: Positive for back pain. Negative for arthralgias and neck pain.   Skin: Negative.  Negative for rash.   Allergic/Immunologic: Negative.  Negative for environmental allergies and food allergies.   Neurological: Positive for dizziness (at times). Negative for syncope and light-headedness.   Hematological: Bruises/bleeds easily.   Psychiatric/Behavioral: Positive for sleep disturbance (wakes with palpitations, cp and soa).       Objective   Vitals:    09/30/21 1114   BP: 108/78   BP Location: Left arm  "  Patient Position: Sitting   Pulse: 114   SpO2: 99%   Weight: 98.4 kg (217 lb)   Height: 175.3 cm (69\")      /78 (BP Location: Left arm, Patient Position: Sitting)   Pulse 114   Ht 175.3 cm (69\")   Wt 98.4 kg (217 lb)   SpO2 99%   BMI 32.05 kg/m²     Lab Results (most recent)     None          Physical Exam  Vitals and nursing note reviewed.   Constitutional:       General: He is not in acute distress.     Appearance: Normal appearance. He is well-developed.   HENT:      Head: Normocephalic and atraumatic.   Eyes:      General: No scleral icterus.        Right eye: No discharge.         Left eye: No discharge.      Conjunctiva/sclera: Conjunctivae normal.   Neck:      Vascular: No carotid bruit.   Cardiovascular:      Rate and Rhythm: Normal rate and regular rhythm.      Heart sounds: Normal heart sounds. No murmur heard.   No friction rub. No gallop.    Pulmonary:      Effort: Pulmonary effort is normal. No respiratory distress.      Breath sounds: Normal breath sounds. No wheezing or rales.   Chest:      Chest wall: No tenderness.   Musculoskeletal:      Right lower leg: No edema.      Left lower leg: No edema.   Skin:     General: Skin is warm and dry.      Coloration: Skin is not pale.      Findings: No erythema or rash.   Neurological:      Mental Status: He is alert and oriented to person, place, and time.      Cranial Nerves: No cranial nerve deficit.   Psychiatric:         Behavior: Behavior normal.         Procedure   Procedures       Assessment/Plan     Problems Addressed this Visit        Cardiac and Vasculature    Ischemic cardiomyopathy - Primary    Relevant Medications    metoprolol succinate XL (TOPROL-XL) 25 MG 24 hr tablet    Coronary artery disease involving native coronary artery of native heart    Relevant Medications    metoprolol succinate XL (TOPROL-XL) 25 MG 24 hr tablet       Pulmonary and Pneumonias    Shortness of breath      Diagnoses       Codes Comments    Ischemic " cardiomyopathy    -  Primary ICD-10-CM: I25.5  ICD-9-CM: 414.8     Coronary artery disease involving native coronary artery of native heart, angina presence unspecified     ICD-10-CM: I25.10  ICD-9-CM: 414.01     Shortness of breath     ICD-10-CM: R06.02  ICD-9-CM: 786.05           Recommendation  1.  Patient is a 46-year-old male who presents to the office for evaluation with history of coronary disease but most recent catheterization in the last 1 to 2 months demonstrated patent grafts and stable anatomy.  Patient's LV function appears to have improved and for now we will continue medical therapy    2 but I would like to stop diltiazem with history of ischemic cardiomyopathy.  I would rather him be on low-dose beta-blocker at this time to see if he can tolerate.    3.  Otherwise his dyspnea is chronic we discussed referral to pulmonology because of his continued tobacco use.  He may have a degree of underlying lung disease.  For now we will make these changes as he appears relatively stable otherwise.  We will see him back for follow-up in 3 to 4 months unless symptoms worsen.  He has nitroglycerin available for chest pain as needed.  Follow-up with primary as scheduled           Patient brought in medicine list to appointment, it's been reviewed with patient and med list was updated in the chart.       Electronically signed by:

## 2021-11-23 DIAGNOSIS — R07.89 CHEST WALL PAIN: ICD-10-CM

## 2021-11-23 RX ORDER — CLOPIDOGREL BISULFATE 75 MG/1
TABLET ORAL
Qty: 30 TABLET | Refills: 0 | Status: SHIPPED | OUTPATIENT
Start: 2021-11-23 | End: 2022-01-04 | Stop reason: SDUPTHER

## 2021-11-23 RX ORDER — CYCLOBENZAPRINE HCL 10 MG
TABLET ORAL
Qty: 60 TABLET | Refills: 0 | Status: SHIPPED | OUTPATIENT
Start: 2021-11-23 | End: 2022-01-24

## 2021-12-23 DIAGNOSIS — R07.2 PRECORDIAL PAIN: ICD-10-CM

## 2021-12-23 RX ORDER — RANOLAZINE 500 MG/1
TABLET, EXTENDED RELEASE ORAL
Qty: 60 TABLET | Refills: 11 | Status: SHIPPED | OUTPATIENT
Start: 2021-12-23 | End: 2022-01-04 | Stop reason: SDUPTHER

## 2022-01-04 ENCOUNTER — OFFICE VISIT (OUTPATIENT)
Dept: CARDIOLOGY | Facility: CLINIC | Age: 47
End: 2022-01-04

## 2022-01-04 VITALS
WEIGHT: 225 LBS | BODY MASS INDEX: 33.33 KG/M2 | HEIGHT: 69 IN | SYSTOLIC BLOOD PRESSURE: 101 MMHG | DIASTOLIC BLOOD PRESSURE: 74 MMHG | OXYGEN SATURATION: 98 % | HEART RATE: 124 BPM

## 2022-01-04 DIAGNOSIS — R07.2 PRECORDIAL PAIN: ICD-10-CM

## 2022-01-04 DIAGNOSIS — R94.30 CARDIAC LV EJECTION FRACTION 30-35%: ICD-10-CM

## 2022-01-04 DIAGNOSIS — I50.22 CHRONIC SYSTOLIC CONGESTIVE HEART FAILURE: ICD-10-CM

## 2022-01-04 DIAGNOSIS — I25.5 ISCHEMIC CARDIOMYOPATHY: ICD-10-CM

## 2022-01-04 DIAGNOSIS — I20.8 CHRONIC STABLE ANGINA: Primary | ICD-10-CM

## 2022-01-04 PROBLEM — I20.89 CHRONIC STABLE ANGINA: Status: ACTIVE | Noted: 2022-01-04

## 2022-01-04 PROCEDURE — 99214 OFFICE O/P EST MOD 30 MIN: CPT | Performed by: PHYSICIAN ASSISTANT

## 2022-01-04 RX ORDER — METOPROLOL SUCCINATE 25 MG/1
25 TABLET, EXTENDED RELEASE ORAL DAILY
Qty: 30 TABLET | Refills: 11 | Status: SHIPPED | OUTPATIENT
Start: 2022-01-04 | End: 2022-09-12

## 2022-01-04 RX ORDER — SACUBITRIL AND VALSARTAN 24; 26 MG/1; MG/1
1 TABLET, FILM COATED ORAL 2 TIMES DAILY
Qty: 60 TABLET | Refills: 11 | Status: SHIPPED | OUTPATIENT
Start: 2022-01-04 | End: 2023-01-25

## 2022-01-04 RX ORDER — FUROSEMIDE 20 MG/1
20 TABLET ORAL DAILY
Qty: 30 TABLET | Refills: 11 | Status: SHIPPED | OUTPATIENT
Start: 2022-01-04

## 2022-01-04 RX ORDER — POTASSIUM CHLORIDE 750 MG/1
10 TABLET, FILM COATED, EXTENDED RELEASE ORAL 2 TIMES DAILY
Qty: 60 TABLET | Refills: 5 | Status: SHIPPED | OUTPATIENT
Start: 2022-01-04

## 2022-01-04 RX ORDER — RANOLAZINE 1000 MG/1
1000 TABLET, EXTENDED RELEASE ORAL 2 TIMES DAILY
Qty: 60 TABLET | Refills: 5 | Status: SHIPPED | OUTPATIENT
Start: 2022-01-04 | End: 2022-08-01

## 2022-01-04 RX ORDER — ASPIRIN 81 MG/1
81 TABLET, COATED ORAL DAILY
Qty: 30 TABLET | Refills: 5 | Status: SHIPPED | OUTPATIENT
Start: 2022-01-04

## 2022-01-04 RX ORDER — ATORVASTATIN CALCIUM 80 MG/1
80 TABLET, FILM COATED ORAL NIGHTLY
Qty: 30 TABLET | Refills: 5 | Status: SHIPPED | OUTPATIENT
Start: 2022-01-04

## 2022-01-04 RX ORDER — CLOPIDOGREL BISULFATE 75 MG/1
75 TABLET ORAL DAILY
Qty: 30 TABLET | Refills: 11 | Status: SHIPPED | OUTPATIENT
Start: 2022-01-04 | End: 2022-01-24

## 2022-01-04 NOTE — PATIENT INSTRUCTIONS
Steps to Quit Smoking  Smoking tobacco is the leading cause of preventable death. It can affect almost every organ in the body. Smoking puts you and people around you at risk for many serious, long-lasting (chronic) diseases. Quitting smoking can be hard, but it is one of the best things that you can do for your health. It is never too late to quit.  How do I get ready to quit?  When you decide to quit smoking, make a plan to help you succeed. Before you quit:  · Pick a date to quit. Set a date within the next 2 weeks to give you time to prepare.  · Write down the reasons why you are quitting. Keep this list in places where you will see it often.  · Tell your family, friends, and co-workers that you are quitting. Their support is important.  · Talk with your doctor about the choices that may help you quit.  · Find out if your health insurance will pay for these treatments.  · Know the people, places, things, and activities that make you want to smoke (triggers). Avoid them.  What first steps can I take to quit smoking?  · Throw away all cigarettes at home, at work, and in your car.  · Throw away the things that you use when you smoke, such as ashtrays and lighters.  · Clean your car. Make sure to empty the ashtray.  · Clean your home, including curtains and carpets.  What can I do to help me quit smoking?  Talk with your doctor about taking medicines and seeing a counselor at the same time. You are more likely to succeed when you do both.  · If you are pregnant or breastfeeding, talk with your doctor about counseling or other ways to quit smoking. Do not take medicine to help you quit smoking unless your doctor tells you to do so.  To quit smoking:  Quit right away  · Quit smoking totally, instead of slowly cutting back on how much you smoke over a period of time.  · Go to counseling. You are more likely to quit if you go to counseling sessions regularly.  Take medicine  You may take medicines to help you quit. Some  medicines need a prescription, and some you can buy over-the-counter. Some medicines may contain a drug called nicotine to replace the nicotine in cigarettes. Medicines may:  · Help you to stop having the desire to smoke (cravings).  · Help to stop the problems that come when you stop smoking (withdrawal symptoms).  Your doctor may ask you to use:  · Nicotine patches, gum, or lozenges.  · Nicotine inhalers or sprays.  · Non-nicotine medicine that is taken by mouth.  Find resources  Find resources and other ways to help you quit smoking and remain smoke-free after you quit. These resources are most helpful when you use them often. They include:  · Online chats with a counselor.  · Phone quitlines.  · Printed self-help materials.  · Support groups or group counseling.  · Text messaging programs.  · Mobile phone apps. Use apps on your mobile phone or tablet that can help you stick to your quit plan. There are many free apps for mobile phones and tablets as well as websites. Examples include Quit Guide from the CDC and smokefree.gov    What things can I do to make it easier to quit?    · Talk to your family and friends. Ask them to support and encourage you.  · Call a phone quitline (6-753-QUITNOW), reach out to support groups, or work with a counselor.  · Ask people who smoke to not smoke around you.  · Avoid places that make you want to smoke, such as:  ? Bars.  ? Parties.  ? Smoke-break areas at work.  · Spend time with people who do not smoke.  · Lower the stress in your life. Stress can make you want to smoke. Try these things to help your stress:  ? Getting regular exercise.  ? Doing deep-breathing exercises.  ? Doing yoga.  ? Meditating.  ? Doing a body scan. To do this, close your eyes, focus on one area of your body at a time from head to toe. Notice which parts of your body are tense. Try to relax the muscles in those areas.  How will I feel when I quit smoking?  Day 1 to 3 weeks  Within the first 24 hours,  you may start to have some problems that come from quitting tobacco. These problems are very bad 2-3 days after you quit, but they do not often last for more than 2-3 weeks. You may get these symptoms:  · Mood swings.  · Feeling restless, nervous, angry, or annoyed.  · Trouble concentrating.  · Dizziness.  · Strong desire for high-sugar foods and nicotine.  · Weight gain.  · Trouble pooping (constipation).  · Feeling like you may vomit (nausea).  · Coughing or a sore throat.  · Changes in how the medicines that you take for other issues work in your body.  · Depression.  · Trouble sleeping (insomnia).  Week 3 and afterward  After the first 2-3 weeks of quitting, you may start to notice more positive results, such as:  · Better sense of smell and taste.  · Less coughing and sore throat.  · Slower heart rate.  · Lower blood pressure.  · Clearer skin.  · Better breathing.  · Fewer sick days.  Quitting smoking can be hard. Do not give up if you fail the first time. Some people need to try a few times before they succeed. Do your best to stick to your quit plan, and talk with your doctor if you have any questions or concerns.  Summary  · Smoking tobacco is the leading cause of preventable death. Quitting smoking can be hard, but it is one of the best things that you can do for your health.  · When you decide to quit smoking, make a plan to help you succeed.  · Quit smoking right away, not slowly over a period of time.  · When you start quitting, seek help from your doctor, family, or friends.  This information is not intended to replace advice given to you by your health care provider. Make sure you discuss any questions you have with your health care provider.  Document Revised: 09/11/2020 Document Reviewed: 03/07/2020  Elsevier Patient Education © 2021 [employer]

## 2022-01-04 NOTE — PROGRESS NOTES
Problem list     Subjective   Navi Hanley is a 46 y.o. male     Chief Complaint   Patient presents with   • Cardiomyopathy   • Coronary Artery Disease   • Rapid Heart Rate   Problem list  1.  Coronary artery disease  1.1 cardiac catheterization April 2019 demonstrated high-grade three-vessel disease with an EF of 30% with recommendations for coronary artery bypass grafting  1.2 coronary artery bypass grafting, three-vessel, April 2019 per patient report, inadequate data  1.3.  Stress test 7/20: Severely depressed post stress ejection fraction of 27% with global hypokinesis and basilar inferior lateral lateral akinesis positive scintigraphic the defect compatible with a relatively large posterior lateral infarct and mild to moderate pacheco-infarct ischemia  1.4 left heart catheterization 8/20: Patient with occluded LAD but patent LIMA to LAD and patent vein graft to the diagonal.  Nonobstructive circumflex and RCA.  PTCA of the diagonal for medically refractory angina but residual stenosis noted.1.5 cardiac catheterization August 2021 with patent grafts noted with native vessel disease with medical management recommended and an EF of 55%  2.  Ischemic cardiomyopathy posterior lateral infarct with mild to moderate pacheco-infarct ischemia post-rest ejection fraction of 27%  2.1 EF historically around 30%  2.2 most recent evaluation by echocardiogram and catheterization was normal at 50-55%  3.  Dyslipidemia  4.  Chronic tobacco use    HPI    Patient is a 46-year-old male who presents back to the office for follow-up.  Patient has chronic symptoms but overall has done well.  As above, he has history of coronary disease but most recent catheterization demonstrated stable anatomy.  He has stable angina.  This is something that has been ongoing for some time.  It does not appear to have progressed.  His dyspnea is stable as well.  Unfortunately, patient continues to smoke.  He does not describe PND  orthopnea.    Occasionally will have palpitations but overall feeling well.  Does not describe any strokelike symptoms.  Does not describe any claudication.  Overall appears stable.    He does have occasional dizziness.  He notices this with position changes at times.  Otherwise he has done well        Current Outpatient Medications on File Prior to Visit   Medication Sig Dispense Refill   • cetirizine (zyrTEC) 10 MG tablet Take 10 mg by mouth Daily.     • cyclobenzaprine (FLEXERIL) 10 MG tablet TAKE ONE TABLET BY MOUTH TWICE A DAY 60 tablet 0   • MAPAP 325 MG tablet 325 mg As Needed.     • naproxen (NAPROSYN) 500 MG tablet TAKE 1 TABLET BY MOUTH TWO TIMES A DAY WITH MEALS 60 tablet 0   • nitroglycerin (NITROSTAT) 0.4 MG SL tablet 1 under the tongue as needed for angina, may repeat q5mins for up three doses 100 tablet 11   • SENNA PLUS 8.6-50 MG per tablet 2 (Two) Times a Day.     • [DISCONTINUED] ASPIRIN LOW DOSE 81 MG EC tablet Take 81 mg by mouth Daily.     • [DISCONTINUED] atorvastatin (LIPITOR) 80 MG tablet Take 80 mg by mouth Daily.     • [DISCONTINUED] clopidogrel (PLAVIX) 75 MG tablet TAKE 1 TABLET BY MOUTH ONCE DAILY 30 tablet 0   • [DISCONTINUED] Entresto 24-26 MG tablet TAKE 1 TABLET BY MOUTH TWO TIMES A DAY 60 tablet 0   • [DISCONTINUED] furosemide (LASIX) 20 MG tablet Take 20 mg by mouth Daily.     • [DISCONTINUED] metoprolol succinate XL (TOPROL-XL) 25 MG 24 hr tablet Take 1 tablet by mouth Daily. 30 tablet 11   • [DISCONTINUED] potassium chloride (K-DUR) 10 MEQ CR tablet 2 (Two) Times a Day.     • [DISCONTINUED] ranolazine (RANEXA) 500 MG 12 hr tablet TAKE 1 TABLET BY MOUTH TWO TIMES A DAY 60 tablet 11     No current facility-administered medications on file prior to visit.       Patient has no known allergies.    Past Medical History:   Diagnosis Date   • Coronary artery disease    • Hyperlipidemia    • Hypertension    • Myocardial infarction (HCC)        Social History     Socioeconomic History   •  "Marital status:    Tobacco Use   • Smoking status: Current Every Day Smoker     Packs/day: 1.00     Years: 30.00     Pack years: 30.00     Types: Cigarettes   • Smokeless tobacco: Former User   Substance and Sexual Activity   • Alcohol use: No   • Drug use: No   • Sexual activity: Defer       Family History   Problem Relation Age of Onset   • Heart disease Mother    • Cancer Mother    • Hyperlipidemia Father    • Hypertension Father        Review of Systems   Constitutional: Positive for fatigue. Negative for chills and fever.   Respiratory: Positive for shortness of breath. Negative for chest tightness.    Cardiovascular: Positive for chest pain (at times) and palpitations (skips and flutters at times). Negative for leg swelling.   Gastrointestinal: Negative.  Negative for abdominal pain, blood in stool, constipation, diarrhea, nausea and vomiting.   Genitourinary: Negative.  Negative for dysuria, frequency, hematuria and urgency.   Musculoskeletal: Negative.  Negative for back pain and neck pain.   Neurological: Positive for dizziness. Negative for syncope and light-headedness.   Psychiatric/Behavioral: Positive for sleep disturbance (wakes at times with soa and cp).       Objective   Vitals:    01/04/22 1409   BP: 101/74   BP Location: Left arm   Patient Position: Sitting   Pulse: (!) 124   SpO2: 98%   Weight: 102 kg (225 lb)   Height: 175.3 cm (69\")      /74 (BP Location: Left arm, Patient Position: Sitting)   Pulse (!) 124   Ht 175.3 cm (69\")   Wt 102 kg (225 lb)   SpO2 98%   BMI 33.23 kg/m²     Lab Results (most recent)     None          Physical Exam  Vitals and nursing note reviewed.   Constitutional:       General: He is not in acute distress.     Appearance: Normal appearance. He is well-developed.   HENT:      Head: Normocephalic and atraumatic.   Eyes:      General: No scleral icterus.        Right eye: No discharge.         Left eye: No discharge.      Conjunctiva/sclera: Conjunctivae " normal.   Neck:      Vascular: No carotid bruit.   Cardiovascular:      Rate and Rhythm: Normal rate and regular rhythm.      Heart sounds: Normal heart sounds. No murmur heard.  No friction rub. No gallop.    Pulmonary:      Effort: Pulmonary effort is normal. No respiratory distress.      Breath sounds: Normal breath sounds. No wheezing or rales.   Chest:      Chest wall: No tenderness.   Musculoskeletal:      Right lower leg: No edema.      Left lower leg: No edema.   Skin:     General: Skin is warm and dry.      Coloration: Skin is not pale.      Findings: No erythema or rash.   Neurological:      Mental Status: He is alert and oriented to person, place, and time.      Cranial Nerves: No cranial nerve deficit.   Psychiatric:         Behavior: Behavior normal.         Procedure   Procedures       Assessment/Plan     Problems Addressed this Visit        Cardiac and Vasculature    Ischemic cardiomyopathy    Relevant Medications    ranolazine (RANEXA) 1000 MG 12 hr tablet    clopidogrel (PLAVIX) 75 MG tablet    sacubitril-valsartan (Entresto) 24-26 MG tablet    metoprolol succinate XL (TOPROL-XL) 25 MG 24 hr tablet    Chest pain    Relevant Medications    ranolazine (RANEXA) 1000 MG 12 hr tablet    Chronic systolic congestive heart failure (HCC)    Relevant Medications    ranolazine (RANEXA) 1000 MG 12 hr tablet    clopidogrel (PLAVIX) 75 MG tablet    sacubitril-valsartan (Entresto) 24-26 MG tablet    metoprolol succinate XL (TOPROL-XL) 25 MG 24 hr tablet    Chronic stable angina (HCC) - Primary    Relevant Medications    ranolazine (RANEXA) 1000 MG 12 hr tablet    clopidogrel (PLAVIX) 75 MG tablet    sacubitril-valsartan (Entresto) 24-26 MG tablet    metoprolol succinate XL (TOPROL-XL) 25 MG 24 hr tablet      Other Visit Diagnoses     Cardiac LV ejection fraction 30-35%        Relevant Medications    sacubitril-valsartan (Entresto) 24-26 MG tablet      Diagnoses       Codes Comments    Chronic stable angina (HCC)     -  Primary ICD-10-CM: I20.8  ICD-9-CM: 413.9     Precordial pain     ICD-10-CM: R07.2  ICD-9-CM: 786.51     Ischemic cardiomyopathy     ICD-10-CM: I25.5  ICD-9-CM: 414.8     Chronic systolic congestive heart failure (HCC)     ICD-10-CM: I50.22  ICD-9-CM: 428.22, 428.0     Cardiac LV ejection fraction 30-35%     ICD-10-CM: R94.30  ICD-9-CM: 785.9           Recommendation  1.  Patient is a 46-year-old male that presents but in the office for evaluation with stable angina.  I would like to increase his Ranexa to give him maximum benefit of antianginal therapy.  Hopefully this will improve his occasional chest discomfort    2.  Patient has chronic systolic heart failure and cardiomyopathy.  However, most recent evaluation showed improvement of systolic function.  For now we will continue current medical regimen and based on his blood pressure, we cannot increase medications any further    3.  He has occasional dizziness which may be linked to hypotension.  I have reviewed blood pressure log and his average blood pressure seems to hang around 100 systolic.  For now he only describes approximately 4 episodes of dizziness since being seen here several months ago.  We will monitor but if this worsens, we may have to consider stopping medication    4.  For now we will see him back for follow-up in 6 months or sooner as symptoms discussed.  Follow-up with primary as scheduled.  He is to see primary soon and have labs drawn including fasting labs.  He is to follow with primary as scheduled         Patient brought in medicine list to appointment, it's been reviewed with patient and med list was updated in the chart.       Electronically signed by:

## 2022-01-24 DIAGNOSIS — R07.89 CHEST WALL PAIN: ICD-10-CM

## 2022-01-24 RX ORDER — CYCLOBENZAPRINE HCL 10 MG
TABLET ORAL
Qty: 60 TABLET | Refills: 0 | Status: SHIPPED | OUTPATIENT
Start: 2022-01-24

## 2022-01-24 RX ORDER — CLOPIDOGREL BISULFATE 75 MG/1
TABLET ORAL
Qty: 30 TABLET | Refills: 0 | Status: SHIPPED | OUTPATIENT
Start: 2022-01-24 | End: 2023-01-25

## 2022-03-28 DIAGNOSIS — R07.89 CHEST WALL PAIN: ICD-10-CM

## 2022-03-28 DIAGNOSIS — R07.2 PRECORDIAL PAIN: ICD-10-CM

## 2022-03-28 RX ORDER — CYCLOBENZAPRINE HCL 10 MG
TABLET ORAL
Qty: 60 TABLET | Refills: 0 | OUTPATIENT
Start: 2022-03-28

## 2022-03-28 RX ORDER — NAPROXEN 500 MG/1
TABLET ORAL
Qty: 60 TABLET | Refills: 0 | OUTPATIENT
Start: 2022-03-28

## 2022-07-13 ENCOUNTER — OFFICE VISIT (OUTPATIENT)
Dept: CARDIOLOGY | Facility: CLINIC | Age: 47
End: 2022-07-13

## 2022-07-13 VITALS
HEIGHT: 69 IN | HEART RATE: 112 BPM | OXYGEN SATURATION: 97 % | BODY MASS INDEX: 33.03 KG/M2 | WEIGHT: 223 LBS | DIASTOLIC BLOOD PRESSURE: 72 MMHG | SYSTOLIC BLOOD PRESSURE: 99 MMHG

## 2022-07-13 DIAGNOSIS — I50.22 CHRONIC SYSTOLIC CONGESTIVE HEART FAILURE: ICD-10-CM

## 2022-07-13 DIAGNOSIS — R07.89 CHEST WALL PAIN: ICD-10-CM

## 2022-07-13 DIAGNOSIS — I25.119 CORONARY ARTERY DISEASE INVOLVING NATIVE CORONARY ARTERY OF NATIVE HEART WITH ANGINA PECTORIS: ICD-10-CM

## 2022-07-13 DIAGNOSIS — I25.5 ISCHEMIC CARDIOMYOPATHY: Primary | ICD-10-CM

## 2022-07-13 PROCEDURE — 99213 OFFICE O/P EST LOW 20 MIN: CPT | Performed by: PHYSICIAN ASSISTANT

## 2022-07-13 NOTE — PROGRESS NOTES
Problem list     Subjective   Navi Hanley is a 47 y.o. male     Chief Complaint   Patient presents with   • Chronic systolic congestive heart failure   • Cardiomyopathy   Problem list  1.  Coronary artery disease  1.1 cardiac catheterization April 2019 demonstrated high-grade three-vessel disease with an EF of 30% with recommendations for coronary artery bypass grafting  1.2 coronary artery bypass grafting, three-vessel, April 2019 per patient report, inadequate data  1.3.  Stress test 7/20: Severely depressed post stress ejection fraction of 27% with global hypokinesis and basilar inferior lateral lateral akinesis positive scintigraphic the defect compatible with a relatively large posterior lateral infarct and mild to moderate pacheco-infarct ischemia  1.4 left heart catheterization 8/20: Patient with occluded LAD but patent LIMA to LAD and patent vein graft to the diagonal.  Nonobstructive circumflex and RCA.  PTCA of the diagonal for medically refractory angina but residual stenosis noted.1.5 cardiac catheterization August 2021 with patent grafts noted with native vessel disease with medical management recommended and an EF of 55%  2.  Ischemic cardiomyopathy posterior lateral infarct with mild to moderate pacheco-infarct ischemia post-rest ejection fraction of 27%  2.1 EF historically around 30%  2.2 most recent evaluation by echocardiogram and catheterization was normal at 50-55%  3.  Dyslipidemia  4.  Chronic tobacco use    HPI    Patient is a 47-year-old male who presents to the office for evaluation.  Patient has done well.  He has history of coronary artery disease with coronary artery bypass grafting.  His most recent catheterization was performed in August 2020 with stable anatomy.    He has stable angina.  Occasionally will feel discomfort but it has improved if not stable.  His dyspnea is mild to moderate but unfortunately continues to smoke.  Overall, he feels slight improvement from last office visit.   He does not describe PND or orthopnea.    Patient occasionally will palpitate.  He describes when he coughs a lot that he would feel a flutter.  He does not describe any strokelike symptoms.  No presyncope or syncope.  Otherwise patient is doing well          Current Outpatient Medications on File Prior to Visit   Medication Sig Dispense Refill   • Aspirin Low Dose 81 MG EC tablet Take 1 tablet by mouth Daily. 30 tablet 5   • atorvastatin (LIPITOR) 80 MG tablet Take 1 tablet by mouth Every Night. 30 tablet 5   • cetirizine (zyrTEC) 10 MG tablet Take 10 mg by mouth Daily.     • clopidogrel (PLAVIX) 75 MG tablet TAKE ONE TABLET BY MOUTH ONCE DAILY 30 tablet 0   • cyclobenzaprine (FLEXERIL) 10 MG tablet TAKE ONE TABLET BY MOUTH TWICE A DAY 60 tablet 0   • furosemide (LASIX) 20 MG tablet Take 1 tablet by mouth Daily. 30 tablet 11   • MAPAP 325 MG tablet 325 mg As Needed.     • metoprolol succinate XL (TOPROL-XL) 25 MG 24 hr tablet Take 1 tablet by mouth Daily. 30 tablet 11   • naproxen (NAPROSYN) 500 MG tablet TAKE 1 TABLET BY MOUTH TWO TIMES A DAY WITH MEALS 60 tablet 0   • nitroglycerin (NITROSTAT) 0.4 MG SL tablet 1 under the tongue as needed for angina, may repeat q5mins for up three doses 100 tablet 11   • potassium chloride 10 MEQ CR tablet Take 1 tablet by mouth 2 (Two) Times a Day. 60 tablet 5   • ranolazine (RANEXA) 1000 MG 12 hr tablet Take 1 tablet by mouth 2 (Two) Times a Day. 60 tablet 5   • sacubitril-valsartan (Entresto) 24-26 MG tablet Take 1 tablet by mouth 2 (Two) Times a Day. 60 tablet 11   • SENNA PLUS 8.6-50 MG per tablet 2 (Two) Times a Day.       No current facility-administered medications on file prior to visit.       Patient has no known allergies.    Past Medical History:   Diagnosis Date   • Coronary artery disease    • Hyperlipidemia    • Hypertension    • Myocardial infarction (HCC)        Social History     Socioeconomic History   • Marital status:    Tobacco Use   • Smoking  "status: Current Every Day Smoker     Packs/day: 1.00     Years: 30.00     Pack years: 30.00     Types: Cigarettes   • Smokeless tobacco: Former User   Substance and Sexual Activity   • Alcohol use: No   • Drug use: No   • Sexual activity: Defer       Family History   Problem Relation Age of Onset   • Heart disease Mother    • Cancer Mother    • Hyperlipidemia Father    • Hypertension Father        Review of Systems   Constitutional: Positive for fatigue. Negative for chills and fever.   HENT: Negative.  Negative for congestion and sinus pressure.    Respiratory: Positive for shortness of breath. Negative for chest tightness.    Cardiovascular: Positive for chest pain and palpitations (flutters). Negative for leg swelling.   Gastrointestinal: Negative.  Negative for abdominal pain, blood in stool, constipation, diarrhea, nausea and vomiting.   Endocrine: Negative.  Negative for cold intolerance and heat intolerance.   Genitourinary: Negative.  Negative for dysuria, frequency, hematuria and urgency.   Neurological: Positive for dizziness (with position change at times, getting up from laying down). Negative for syncope and light-headedness.   Psychiatric/Behavioral: Positive for sleep disturbance (wakes with soa and cp occasionally).       Objective   Vitals:    07/13/22 1030   BP: 99/72   BP Location: Left arm   Patient Position: Sitting   Pulse: 112   SpO2: 97%   Weight: 101 kg (223 lb)   Height: 175.3 cm (69\")      BP 99/72 (BP Location: Left arm, Patient Position: Sitting)   Pulse 112   Ht 175.3 cm (69\")   Wt 101 kg (223 lb)   SpO2 97%   BMI 32.93 kg/m²     Lab Results (most recent)     None          Physical Exam  Vitals and nursing note reviewed.   Constitutional:       General: He is not in acute distress.     Appearance: Normal appearance. He is well-developed.   HENT:      Head: Normocephalic and atraumatic.   Eyes:      General: No scleral icterus.        Right eye: No discharge.         Left eye: No " discharge.      Conjunctiva/sclera: Conjunctivae normal.   Neck:      Vascular: No carotid bruit.   Cardiovascular:      Rate and Rhythm: Normal rate and regular rhythm.      Heart sounds: Normal heart sounds. No murmur heard.    No friction rub. No gallop.   Pulmonary:      Effort: Pulmonary effort is normal. No respiratory distress.      Breath sounds: Normal breath sounds. No wheezing or rales.   Chest:      Chest wall: No tenderness.   Musculoskeletal:      Right lower leg: No edema.      Left lower leg: No edema.   Skin:     General: Skin is warm and dry.      Coloration: Skin is not pale.      Findings: No erythema or rash.   Neurological:      Mental Status: He is alert and oriented to person, place, and time.      Cranial Nerves: No cranial nerve deficit.   Psychiatric:         Behavior: Behavior normal.         Procedure   Procedures       Assessment & Plan     Problems Addressed this Visit        Cardiac and Vasculature    Ischemic cardiomyopathy - Primary    Coronary artery disease involving native coronary artery of native heart with angina pectoris (HCC)    Chronic systolic congestive heart failure (HCC)       Musculoskeletal and Injuries    Chest wall pain      Diagnoses       Codes Comments    Ischemic cardiomyopathy    -  Primary ICD-10-CM: I25.5  ICD-9-CM: 414.8     Chronic systolic congestive heart failure (HCC)     ICD-10-CM: I50.22  ICD-9-CM: 428.22, 428.0     Chest wall pain     ICD-10-CM: R07.89  ICD-9-CM: 786.52     Coronary artery disease involving native coronary artery of native heart with angina pectoris (HCC)     ICD-10-CM: I25.119  ICD-9-CM: 414.01, 413.9           Recommendation  1.  Patient is a 47-year-old male who presents to the office for evaluation.  Patient has coronary disease and is doing well.  His angina is stable and overall feels improved since last visit.  He had stable anatomy by prior catheterization and as long as he is doing well on medical therapy, we will  continue.    2.  Patient with chronic systolic heart failure doing well and is euvolemic by examination.  For now we will monitor    3.  In regards to cardiomyopathy, patient on appropriate medication.  Because of slight hypotension, we cannot increase medications any further.  He is doing well.  For now, we will continue current medicines.    4.  Patient has labs routinely monitored by primary and I recommend him smoking.  If lipids are not controlled, I would recommend Repatha or similar medication to help in regards to risk factor modification.  For now, it is monitored by primary.  We will see him back for follow-up in 6 months or sooner as symptoms discussed.  Follow-up with primary as scheduled           Navi Hanley  reports that he has been smoking cigarettes. He has a 30.00 pack-year smoking history. He has quit using smokeless tobacco.. I have educated him on the risk of diseases from using tobacco products such as cancer, COPD and heart disease.     I advised him to quit and he is not willing to quit.    I spent 3  minutes counseling the patient.          .Patient brought in medicine list to appointment, it's been reviewed with patient and med list was updated in the chart.       Electronically signed by:

## 2022-07-29 DIAGNOSIS — R07.2 PRECORDIAL PAIN: ICD-10-CM

## 2022-08-01 RX ORDER — RANOLAZINE 1000 MG/1
TABLET, EXTENDED RELEASE ORAL
Qty: 60 TABLET | Refills: 11 | Status: SHIPPED | OUTPATIENT
Start: 2022-08-01

## 2022-09-12 RX ORDER — METOPROLOL SUCCINATE 25 MG/1
TABLET, EXTENDED RELEASE ORAL
Qty: 90 TABLET | Refills: 3 | Status: SHIPPED | OUTPATIENT
Start: 2022-09-12

## 2023-01-16 ENCOUNTER — OFFICE VISIT (OUTPATIENT)
Dept: CARDIOLOGY | Facility: CLINIC | Age: 48
End: 2023-01-16
Payer: COMMERCIAL

## 2023-01-16 VITALS
HEIGHT: 69 IN | SYSTOLIC BLOOD PRESSURE: 105 MMHG | OXYGEN SATURATION: 99 % | HEART RATE: 115 BPM | WEIGHT: 223.2 LBS | BODY MASS INDEX: 33.06 KG/M2 | DIASTOLIC BLOOD PRESSURE: 69 MMHG

## 2023-01-16 DIAGNOSIS — I50.22 CHRONIC SYSTOLIC CONGESTIVE HEART FAILURE: ICD-10-CM

## 2023-01-16 DIAGNOSIS — I25.119 CORONARY ARTERY DISEASE INVOLVING NATIVE CORONARY ARTERY OF NATIVE HEART WITH ANGINA PECTORIS: ICD-10-CM

## 2023-01-16 DIAGNOSIS — I25.5 ISCHEMIC CARDIOMYOPATHY: Primary | ICD-10-CM

## 2023-01-16 DIAGNOSIS — R00.2 PALPITATIONS: ICD-10-CM

## 2023-01-16 PROCEDURE — 99214 OFFICE O/P EST MOD 30 MIN: CPT | Performed by: PHYSICIAN ASSISTANT

## 2023-01-16 PROCEDURE — 93000 ELECTROCARDIOGRAM COMPLETE: CPT | Performed by: PHYSICIAN ASSISTANT

## 2023-01-16 RX ORDER — NITROGLYCERIN 0.4 MG/1
TABLET SUBLINGUAL
Qty: 100 TABLET | Refills: 11 | Status: SHIPPED | OUTPATIENT
Start: 2023-01-16

## 2023-01-16 NOTE — LETTER
January 16, 2023     SAIGE Kimble  [address]  [address]  [facility] [id number]    Patient: Navi Hanley   YOB: 1975   Date of Visit: 1/16/2023       Dear SAIGE Kimble    Navi Hanley was in my office today. Below is a copy of my note.    If you have questions, please do not hesitate to call me. I look forward to following Navi along with you.         Sincerely,        GARTH Lee        CC: No Recipients    Problem list     Subjective    Navi Hanley is a 47 y.o. male     Chief Complaint   Patient presents with   • Follow-up     6 MTH F/U    Problem list  1.  Coronary artery disease  1.1 cardiac catheterization April 2019 demonstrated high-grade three-vessel disease with an EF of 30% with recommendations for coronary artery bypass grafting  1.2 coronary artery bypass grafting, three-vessel, April 2019 per patient report, inadequate data  1.3.  Stress test 7/20: Severely depressed post stress ejection fraction of 27% with global hypokinesis and basilar inferior lateral lateral akinesis positive scintigraphic the defect compatible with a relatively large posterior lateral infarct and mild to moderate pacheco-infarct ischemia  1.4 left heart catheterization 8/20: Patient with occluded LAD but patent LIMA to LAD and patent vein graft to the diagonal.  Nonobstructive circumflex and RCA.  PTCA of the diagonal for medically refractory angina but residual stenosis noted.  1.5 cardiac catheterization August 2021 with patent grafts noted with native vessel disease with medical management recommended and an EF of 55%  2.  Ischemic cardiomyopathy posterior lateral infarct with mild to moderate pacheco-infarct ischemia post-rest ejection fraction of 27%  2.1 EF historically around 30%  2.2 most recent evaluation by echocardiogram and catheterization was normal at 50-55%  3.  Dyslipidemia  4.  Chronic tobacco use       HPI    Patient is a 47-year-old male who presents to the  "office for evaluation and for follow-up.  Patient is doing well.  Patient has stable angina.  He had cardiac catheterization last in August 2021 with native vessel disease but stable anatomy.  Patient is on antianginal therapy and continues occasionally feel discomfort.  He occasionally takes nitroglycerin.  This is not something that has been progressive but rather stable.    He has dyspnea that is mild to moderate as well.  Patient feels he is primarily at his baseline and describes having \"good days and bad days\".    He does palpitate at times.  He will notice a fluttering type sensation.  He does not describe any associated dizziness, presyncope or syncope.  He does describe being lightheaded upon standing and otherwise has done well.    Current Outpatient Medications on File Prior to Visit   Medication Sig Dispense Refill   • Aspirin Low Dose 81 MG EC tablet Take 1 tablet by mouth Daily. 30 tablet 5   • atorvastatin (LIPITOR) 80 MG tablet Take 1 tablet by mouth Every Night. 30 tablet 5   • cetirizine (zyrTEC) 10 MG tablet Take 10 mg by mouth Daily.     • clopidogrel (PLAVIX) 75 MG tablet TAKE ONE TABLET BY MOUTH ONCE DAILY 30 tablet 0   • cyclobenzaprine (FLEXERIL) 10 MG tablet TAKE ONE TABLET BY MOUTH TWICE A DAY 60 tablet 0   • furosemide (LASIX) 20 MG tablet Take 1 tablet by mouth Daily. 30 tablet 11   • MAPAP 325 MG tablet 325 mg As Needed.     • metoprolol succinate XL (TOPROL-XL) 25 MG 24 hr tablet TAKE ONE TABLET BY MOUTH ONCE DAILY 90 tablet 3   • naproxen (NAPROSYN) 500 MG tablet TAKE 1 TABLET BY MOUTH TWO TIMES A DAY WITH MEALS 60 tablet 0   • potassium chloride 10 MEQ CR tablet Take 1 tablet by mouth 2 (Two) Times a Day. 60 tablet 5   • ranolazine (RANEXA) 1000 MG 12 hr tablet TAKE ONE TABLET BY MOUTH TWICE A DAY 60 tablet 11   • sacubitril-valsartan (Entresto) 24-26 MG tablet Take 1 tablet by mouth 2 (Two) Times a Day. 60 tablet 11   • SENNA PLUS 8.6-50 MG per tablet 2 (Two) Times a Day.     • " "[DISCONTINUED] nitroglycerin (NITROSTAT) 0.4 MG SL tablet 1 under the tongue as needed for angina, may repeat q5mins for up three doses 100 tablet 11     No current facility-administered medications on file prior to visit.       Patient has no known allergies.    Past Medical History:   Diagnosis Date   • Coronary artery disease    • Hyperlipidemia    • Hypertension    • Myocardial infarction (HCC)        Social History     Socioeconomic History   • Marital status:    Tobacco Use   • Smoking status: Every Day     Packs/day: 1.00     Years: 30.00     Pack years: 30.00     Types: Cigarettes   • Smokeless tobacco: Former   Substance and Sexual Activity   • Alcohol use: No   • Drug use: No   • Sexual activity: Defer       Family History   Problem Relation Age of Onset   • Heart disease Mother    • Cancer Mother    • Hyperlipidemia Father    • Hypertension Father        Review of Systems   Constitutional: Negative for appetite change, chills and fever.   HENT: Negative.  Negative for congestion, dental problem, drooling, ear discharge, ear pain, postnasal drip, rhinorrhea, sinus pressure and sinus pain.    Eyes: Negative for pain, redness, itching and visual disturbance.   Respiratory: Positive for cough, choking, chest tightness, shortness of breath and wheezing.    Cardiovascular: Positive for chest pain (SELDOM), palpitations and leg swelling.   Gastrointestinal: Negative for blood in stool, constipation, diarrhea and nausea.   Genitourinary: Negative.  Negative for difficulty urinating.   Neurological: Positive for dizziness, weakness and numbness (hands and fingers go numb, left upper chest also goes numb pt states).   Psychiatric/Behavioral: Positive for sleep disturbance.       Objective    Vitals:    01/16/23 0839   BP: 105/69   Pulse: 115   SpO2: 99%   Weight: 101 kg (223 lb 3.2 oz)   Height: 175.3 cm (69.02\")      /69   Pulse 115   Ht 175.3 cm (69.02\")   Wt 101 kg (223 lb 3.2 oz)   SpO2 99%   " BMI 32.95 kg/m²     Lab Results (most recent)     None          Physical Exam  Vitals and nursing note reviewed.   Constitutional:       General: He is not in acute distress.     Appearance: Normal appearance. He is well-developed.   HENT:      Head: Normocephalic and atraumatic.   Eyes:      General: No scleral icterus.        Right eye: No discharge.         Left eye: No discharge.      Conjunctiva/sclera: Conjunctivae normal.   Neck:      Vascular: No carotid bruit.   Cardiovascular:      Rate and Rhythm: Normal rate and regular rhythm.      Heart sounds: Normal heart sounds. No murmur heard.    No friction rub. No gallop.   Pulmonary:      Effort: Pulmonary effort is normal. No respiratory distress.      Breath sounds: Normal breath sounds. No wheezing or rales.   Chest:      Chest wall: No tenderness.   Musculoskeletal:      Right lower leg: No edema.      Left lower leg: No edema.   Skin:     General: Skin is warm and dry.      Coloration: Skin is not pale.      Findings: No erythema or rash.   Neurological:      Mental Status: He is alert and oriented to person, place, and time.      Cranial Nerves: No cranial nerve deficit.   Psychiatric:         Behavior: Behavior normal.         Procedure      ECG 12 Lead    Date/Time: 1/16/2023 8:43 AM  Performed by: Bolivar Poon PA  Authorized by: Bolivar Poon PA   Comparison: compared with previous ECG from 9/2/2021  Comments: EKG demonstrates sinus rhythm at 92 bpm, normal axis,, nonspecific IVCD and no acute ST changes              Assessment & Plan     Problems Addressed this Visit        Cardiac and Vasculature    Ischemic cardiomyopathy - Primary    Relevant Medications    nitroglycerin (NITROSTAT) 0.4 MG SL tablet    Coronary artery disease involving native coronary artery of native heart with angina pectoris (HCC)    Relevant Medications    nitroglycerin (NITROSTAT) 0.4 MG SL tablet    Chronic systolic congestive heart failure (HCC)    Relevant  Medications    nitroglycerin (NITROSTAT) 0.4 MG SL tablet    Palpitations   Diagnoses       Codes Comments    Ischemic cardiomyopathy    -  Primary ICD-10-CM: I25.5  ICD-9-CM: 414.8     Coronary artery disease involving native coronary artery of native heart with angina pectoris (HCC)     ICD-10-CM: I25.119  ICD-9-CM: 414.01, 413.9     Palpitations     ICD-10-CM: R00.2  ICD-9-CM: 785.1     Chronic systolic congestive heart failure (HCC)     ICD-10-CM: I50.22  ICD-9-CM: 428.22, 428.0           Recommendation  1.  Patient is a 47-year-old male who presents to the office for evaluation with coronary disease.  Stable angina.  We discussed repeat testing but he feels he is doing well.  He is on antianginal medications.  We will refill his nitroglycerin.  We did discuss that if symptoms worsen, his pain progressed or intensified, he is to let us know and we can consider further testing or adjustment of medications.    Patient with ischemic cardiomyopathy on beta-blocker and Entresto.  His blood pressure remains slightly low.  We discussed for him to monitor because of his dizziness and lightheadedness upon standing.  If this becomes an issue, we need to alter medications but this is ultimately helped his LV function it seems based on the most recent evaluation    3.  Patient with chronic systolic heart failure doing well on diuretic therapy.  He appears euvolemic today.    4.  Palpitations do occur.  We had him wear event monitor 2020 with PVCs and PACs but no other significant arrhythmia.  For now, we can monitor.  He does not describe any symptoms to suggest cerebral Bolick events.  We will see him back for follow-up in 6 months or sooner as symptoms discussed.  Follow-up with primary as scheduled.          Navi Hanley  reports that he has been smoking cigarettes. He has a 30.00 pack-year smoking history. He has quit using smokeless tobacco..          Electronically signed by:

## 2023-01-16 NOTE — PROGRESS NOTES
Problem list     Subjective   Navi Hanley is a 47 y.o. male     Chief Complaint   Patient presents with   • Follow-up     6 MTH F/U    Problem list  1.  Coronary artery disease  1.1 cardiac catheterization April 2019 demonstrated high-grade three-vessel disease with an EF of 30% with recommendations for coronary artery bypass grafting  1.2 coronary artery bypass grafting, three-vessel, April 2019 per patient report, inadequate data  1.3.  Stress test 7/20: Severely depressed post stress ejection fraction of 27% with global hypokinesis and basilar inferior lateral lateral akinesis positive scintigraphic the defect compatible with a relatively large posterior lateral infarct and mild to moderate pacheco-infarct ischemia  1.4 left heart catheterization 8/20: Patient with occluded LAD but patent LIMA to LAD and patent vein graft to the diagonal.  Nonobstructive circumflex and RCA.  PTCA of the diagonal for medically refractory angina but residual stenosis noted.  1.5 cardiac catheterization August 2021 with patent grafts noted with native vessel disease with medical management recommended and an EF of 55%  2.  Ischemic cardiomyopathy posterior lateral infarct with mild to moderate pacheco-infarct ischemia post-rest ejection fraction of 27%  2.1 EF historically around 30%  2.2 most recent evaluation by echocardiogram and catheterization was normal at 50-55%  3.  Dyslipidemia  4.  Chronic tobacco use       HPI    Patient is a 47-year-old male who presents to the office for evaluation and for follow-up.  Patient is doing well.  Patient has stable angina.  He had cardiac catheterization last in August 2021 with native vessel disease but stable anatomy.  Patient is on antianginal therapy and continues occasionally feel discomfort.  He occasionally takes nitroglycerin.  This is not something that has been progressive but rather stable.    He has dyspnea that is mild to moderate as well.  Patient feels he is primarily at his  "baseline and describes having \"good days and bad days\".    He does palpitate at times.  He will notice a fluttering type sensation.  He does not describe any associated dizziness, presyncope or syncope.  He does describe being lightheaded upon standing and otherwise has done well.    Current Outpatient Medications on File Prior to Visit   Medication Sig Dispense Refill   • Aspirin Low Dose 81 MG EC tablet Take 1 tablet by mouth Daily. 30 tablet 5   • atorvastatin (LIPITOR) 80 MG tablet Take 1 tablet by mouth Every Night. 30 tablet 5   • cetirizine (zyrTEC) 10 MG tablet Take 10 mg by mouth Daily.     • clopidogrel (PLAVIX) 75 MG tablet TAKE ONE TABLET BY MOUTH ONCE DAILY 30 tablet 0   • cyclobenzaprine (FLEXERIL) 10 MG tablet TAKE ONE TABLET BY MOUTH TWICE A DAY 60 tablet 0   • furosemide (LASIX) 20 MG tablet Take 1 tablet by mouth Daily. 30 tablet 11   • MAPAP 325 MG tablet 325 mg As Needed.     • metoprolol succinate XL (TOPROL-XL) 25 MG 24 hr tablet TAKE ONE TABLET BY MOUTH ONCE DAILY 90 tablet 3   • naproxen (NAPROSYN) 500 MG tablet TAKE 1 TABLET BY MOUTH TWO TIMES A DAY WITH MEALS 60 tablet 0   • potassium chloride 10 MEQ CR tablet Take 1 tablet by mouth 2 (Two) Times a Day. 60 tablet 5   • ranolazine (RANEXA) 1000 MG 12 hr tablet TAKE ONE TABLET BY MOUTH TWICE A DAY 60 tablet 11   • sacubitril-valsartan (Entresto) 24-26 MG tablet Take 1 tablet by mouth 2 (Two) Times a Day. 60 tablet 11   • SENNA PLUS 8.6-50 MG per tablet 2 (Two) Times a Day.     • [DISCONTINUED] nitroglycerin (NITROSTAT) 0.4 MG SL tablet 1 under the tongue as needed for angina, may repeat q5mins for up three doses 100 tablet 11     No current facility-administered medications on file prior to visit.       Patient has no known allergies.    Past Medical History:   Diagnosis Date   • Coronary artery disease    • Hyperlipidemia    • Hypertension    • Myocardial infarction (HCC)        Social History     Socioeconomic History   • Marital status: " "   Tobacco Use   • Smoking status: Every Day     Packs/day: 1.00     Years: 30.00     Pack years: 30.00     Types: Cigarettes   • Smokeless tobacco: Former   Substance and Sexual Activity   • Alcohol use: No   • Drug use: No   • Sexual activity: Defer       Family History   Problem Relation Age of Onset   • Heart disease Mother    • Cancer Mother    • Hyperlipidemia Father    • Hypertension Father        Review of Systems   Constitutional: Negative for appetite change, chills and fever.   HENT: Negative.  Negative for congestion, dental problem, drooling, ear discharge, ear pain, postnasal drip, rhinorrhea, sinus pressure and sinus pain.    Eyes: Negative for pain, redness, itching and visual disturbance.   Respiratory: Positive for cough, choking, chest tightness, shortness of breath and wheezing.    Cardiovascular: Positive for chest pain (SELDOM), palpitations and leg swelling.   Gastrointestinal: Negative for blood in stool, constipation, diarrhea and nausea.   Genitourinary: Negative.  Negative for difficulty urinating.   Neurological: Positive for dizziness, weakness and numbness (hands and fingers go numb, left upper chest also goes numb pt states).   Psychiatric/Behavioral: Positive for sleep disturbance.       Objective   Vitals:    01/16/23 0839   BP: 105/69   Pulse: 115   SpO2: 99%   Weight: 101 kg (223 lb 3.2 oz)   Height: 175.3 cm (69.02\")      /69   Pulse 115   Ht 175.3 cm (69.02\")   Wt 101 kg (223 lb 3.2 oz)   SpO2 99%   BMI 32.95 kg/m²     Lab Results (most recent)     None          Physical Exam  Vitals and nursing note reviewed.   Constitutional:       General: He is not in acute distress.     Appearance: Normal appearance. He is well-developed.   HENT:      Head: Normocephalic and atraumatic.   Eyes:      General: No scleral icterus.        Right eye: No discharge.         Left eye: No discharge.      Conjunctiva/sclera: Conjunctivae normal.   Neck:      Vascular: No carotid " bruit.   Cardiovascular:      Rate and Rhythm: Normal rate and regular rhythm.      Heart sounds: Normal heart sounds. No murmur heard.    No friction rub. No gallop.   Pulmonary:      Effort: Pulmonary effort is normal. No respiratory distress.      Breath sounds: Normal breath sounds. No wheezing or rales.   Chest:      Chest wall: No tenderness.   Musculoskeletal:      Right lower leg: No edema.      Left lower leg: No edema.   Skin:     General: Skin is warm and dry.      Coloration: Skin is not pale.      Findings: No erythema or rash.   Neurological:      Mental Status: He is alert and oriented to person, place, and time.      Cranial Nerves: No cranial nerve deficit.   Psychiatric:         Behavior: Behavior normal.         Procedure     ECG 12 Lead    Date/Time: 1/16/2023 8:43 AM  Performed by: Bolivar Poon PA  Authorized by: Bolivar Poon PA   Comparison: compared with previous ECG from 9/2/2021  Comments: EKG demonstrates sinus rhythm at 92 bpm, normal axis,, nonspecific IVCD and no acute ST changes               Assessment & Plan     Problems Addressed this Visit        Cardiac and Vasculature    Ischemic cardiomyopathy - Primary    Relevant Medications    nitroglycerin (NITROSTAT) 0.4 MG SL tablet    Coronary artery disease involving native coronary artery of native heart with angina pectoris (HCC)    Relevant Medications    nitroglycerin (NITROSTAT) 0.4 MG SL tablet    Chronic systolic congestive heart failure (HCC)    Relevant Medications    nitroglycerin (NITROSTAT) 0.4 MG SL tablet    Palpitations   Diagnoses       Codes Comments    Ischemic cardiomyopathy    -  Primary ICD-10-CM: I25.5  ICD-9-CM: 414.8     Coronary artery disease involving native coronary artery of native heart with angina pectoris (HCC)     ICD-10-CM: I25.119  ICD-9-CM: 414.01, 413.9     Palpitations     ICD-10-CM: R00.2  ICD-9-CM: 785.1     Chronic systolic congestive heart failure (HCC)     ICD-10-CM: I50.22  ICD-9-CM:  428.22, 428.0           Recommendation  1.  Patient is a 47-year-old male who presents to the office for evaluation with coronary disease.  Stable angina.  We discussed repeat testing but he feels he is doing well.  He is on antianginal medications.  We will refill his nitroglycerin.  We did discuss that if symptoms worsen, his pain progressed or intensified, he is to let us know and we can consider further testing or adjustment of medications.    Patient with ischemic cardiomyopathy on beta-blocker and Entresto.  His blood pressure remains slightly low.  We discussed for him to monitor because of his dizziness and lightheadedness upon standing.  If this becomes an issue, we need to alter medications but this is ultimately helped his LV function it seems based on the most recent evaluation    3.  Patient with chronic systolic heart failure doing well on diuretic therapy.  He appears euvolemic today.    4.  Palpitations do occur.  We had him wear event monitor 2020 with PVCs and PACs but no other significant arrhythmia.  For now, we can monitor.  He does not describe any symptoms to suggest cerebral Bolick events.  We will see him back for follow-up in 6 months or sooner as symptoms discussed.  Follow-up with primary as scheduled.           Navi Lozanotree  reports that he has been smoking cigarettes. He has a 30.00 pack-year smoking history. He has quit using smokeless tobacco..          Electronically signed by:

## 2023-01-25 DIAGNOSIS — R94.30 CARDIAC LV EJECTION FRACTION 30-35%: ICD-10-CM

## 2023-01-25 DIAGNOSIS — I50.22 CHRONIC SYSTOLIC CONGESTIVE HEART FAILURE: ICD-10-CM

## 2023-01-25 DIAGNOSIS — I25.5 ISCHEMIC CARDIOMYOPATHY: ICD-10-CM

## 2023-01-25 RX ORDER — CLOPIDOGREL BISULFATE 75 MG/1
TABLET ORAL
Qty: 30 TABLET | Refills: 11 | Status: SHIPPED | OUTPATIENT
Start: 2023-01-25

## 2023-01-25 RX ORDER — SACUBITRIL AND VALSARTAN 24; 26 MG/1; MG/1
TABLET, FILM COATED ORAL
Qty: 60 TABLET | Refills: 11 | Status: SHIPPED | OUTPATIENT
Start: 2023-01-25

## 2023-07-20 PROBLEM — I10 ESSENTIAL HYPERTENSION: Status: ACTIVE | Noted: 2023-07-20

## 2023-08-14 DIAGNOSIS — R07.2 PRECORDIAL PAIN: ICD-10-CM

## 2023-08-14 RX ORDER — RANOLAZINE 1000 MG/1
TABLET, EXTENDED RELEASE ORAL
Qty: 60 TABLET | Refills: 11 | Status: SHIPPED | OUTPATIENT
Start: 2023-08-14

## 2023-09-11 RX ORDER — METOPROLOL SUCCINATE 25 MG/1
TABLET, EXTENDED RELEASE ORAL
Qty: 30 TABLET | Refills: 11 | Status: SHIPPED | OUTPATIENT
Start: 2023-09-11

## 2024-01-12 DIAGNOSIS — I25.5 ISCHEMIC CARDIOMYOPATHY: ICD-10-CM

## 2024-01-12 DIAGNOSIS — I50.22 CHRONIC SYSTOLIC CONGESTIVE HEART FAILURE: ICD-10-CM

## 2024-01-12 DIAGNOSIS — R94.30 CARDIAC LV EJECTION FRACTION 30-35%: ICD-10-CM

## 2024-01-12 RX ORDER — CLOPIDOGREL BISULFATE 75 MG/1
75 TABLET ORAL DAILY
Qty: 30 TABLET | Refills: 11 | Status: SHIPPED | OUTPATIENT
Start: 2024-01-12

## 2024-01-12 RX ORDER — SACUBITRIL AND VALSARTAN 24; 26 MG/1; MG/1
TABLET, FILM COATED ORAL
Qty: 60 TABLET | Refills: 11 | Status: SHIPPED | OUTPATIENT
Start: 2024-01-12

## 2024-02-08 ENCOUNTER — OFFICE VISIT (OUTPATIENT)
Dept: CARDIOLOGY | Facility: CLINIC | Age: 49
End: 2024-02-08
Payer: COMMERCIAL

## 2024-02-08 VITALS
HEIGHT: 69 IN | HEART RATE: 118 BPM | WEIGHT: 208 LBS | SYSTOLIC BLOOD PRESSURE: 113 MMHG | BODY MASS INDEX: 30.81 KG/M2 | DIASTOLIC BLOOD PRESSURE: 79 MMHG | OXYGEN SATURATION: 98 %

## 2024-02-08 DIAGNOSIS — R06.02 SHORTNESS OF BREATH: ICD-10-CM

## 2024-02-08 DIAGNOSIS — I25.5 ISCHEMIC CARDIOMYOPATHY: Primary | ICD-10-CM

## 2024-02-08 DIAGNOSIS — I50.22 CHRONIC SYSTOLIC CONGESTIVE HEART FAILURE: ICD-10-CM

## 2024-02-08 DIAGNOSIS — I25.119 CORONARY ARTERY DISEASE INVOLVING NATIVE CORONARY ARTERY OF NATIVE HEART WITH ANGINA PECTORIS: ICD-10-CM

## 2024-02-08 PROCEDURE — 99214 OFFICE O/P EST MOD 30 MIN: CPT | Performed by: PHYSICIAN ASSISTANT

## 2024-02-08 PROCEDURE — 3074F SYST BP LT 130 MM HG: CPT | Performed by: PHYSICIAN ASSISTANT

## 2024-02-08 PROCEDURE — 3078F DIAST BP <80 MM HG: CPT | Performed by: PHYSICIAN ASSISTANT

## 2024-02-08 PROCEDURE — 93000 ELECTROCARDIOGRAM COMPLETE: CPT | Performed by: PHYSICIAN ASSISTANT

## 2024-02-08 RX ORDER — METOPROLOL SUCCINATE 50 MG/1
50 TABLET, EXTENDED RELEASE ORAL DAILY
Qty: 30 TABLET | Refills: 11 | Status: SHIPPED | OUTPATIENT
Start: 2024-02-08

## 2024-02-08 NOTE — PROGRESS NOTES
Problem list     Subjective   Navi Hanley is a 48 y.o. male     Chief Complaint   Patient presents with    Follow-up     6 months   Problem list  1.  Coronary artery disease  1.1 cardiac catheterization April 2019 demonstrated high-grade three-vessel disease with an EF of 30% with recommendations for coronary artery bypass grafting  1.2 coronary artery bypass grafting, three-vessel, April 2019 per patient report, inadequate data  1.3.  Stress test 7/20: Severely depressed post stress ejection fraction of 27% with global hypokinesis and basilar inferior lateral lateral akinesis positive scintigraphic the defect compatible with a relatively large posterior lateral infarct and mild to moderate pacheco-infarct ischemia  1.4 left heart catheterization 8/20: Patient with occluded LAD but patent LIMA to LAD and patent vein graft to the diagonal.  Nonobstructive circumflex and RCA.  PTCA of the diagonal for medically refractory angina but residual stenosis noted.  1.5 cardiac catheterization August 2021 with patent grafts noted with native vessel disease with medical management recommended and an EF of 55%  2.  Ischemic cardiomyopathy posterior lateral infarct with mild to moderate pacheco-infarct ischemia post-rest ejection fraction of 27%  2.1 EF historically around 30%  2.2 most recent evaluation by echocardiogram and catheterization was normal at 50-55%  3.  Dyslipidemia  4.  Chronic tobacco use       HPI    Patient is a 48-year-old male who presents back to the office for routine cardiac assessment.    Patient has history of coronary disease as well as ischemic cardiomyopathy with most recent evaluation of is seem to suggest normalization.    Patient has chronic symptoms.  He currently has chest pain, dyspnea and palpitations.  Before and even after his catheterization, he continued to have symptoms despite medical therapy.    On discussion, patient does not describe his chest pain being any more severe than what has  been in the past.  He takes nitroglycerin occasionally for chest pain.  He also is short of breath that is significant.  However, patient continues to smoke.  He does not describe PND or orthopnea.    He does palpitate.  He will feel a fluttering sensation at times.  Last event monitor showed premature beats with PACs and PVCs.  No malignant arrhythmia was identified.    Patient does occasionally have lightheadedness upon standing.  Patient has been on metoprolol and Entresto.  His blood pressure has been low.  Last office visit was 94/67 mmHg.  He is stable otherwise.      Current Outpatient Medications on File Prior to Visit   Medication Sig Dispense Refill    Aspirin Low Dose 81 MG EC tablet Take 1 tablet by mouth Daily. 30 tablet 5    atorvastatin (LIPITOR) 80 MG tablet Take 1 tablet by mouth Every Night. 30 tablet 5    cetirizine (zyrTEC) 10 MG tablet Take 1 tablet by mouth Daily.      clopidogrel (PLAVIX) 75 MG tablet TAKE 1 TABLET BY MOUTH ONCE DAILY 30 tablet 11    cyclobenzaprine (FLEXERIL) 10 MG tablet TAKE ONE TABLET BY MOUTH TWICE A DAY (Patient taking differently: 3 (Three) Times a Day.) 60 tablet 0    furosemide (LASIX) 20 MG tablet Take 1 tablet by mouth Daily. 30 tablet 11    MAPAP 325 MG tablet 1 tablet As Needed.      naproxen (NAPROSYN) 500 MG tablet TAKE 1 TABLET BY MOUTH TWO TIMES A DAY WITH MEALS 60 tablet 0    nitroglycerin (NITROSTAT) 0.4 MG SL tablet 1 under the tongue as needed for angina, may repeat q5mins for up three doses 100 tablet 11    potassium chloride 10 MEQ CR tablet Take 1 tablet by mouth 2 (Two) Times a Day. 60 tablet 5    ranolazine (RANEXA) 1000 MG 12 hr tablet TAKE 1 TABLET BY MOUTH TWICE A DAY 60 tablet 11    SENNA PLUS 8.6-50 MG per tablet 2 (Two) Times a Day.      [DISCONTINUED] Entresto 24-26 MG tablet TAKE 1 TABLET BY MOUTH TWICE A DAY 60 tablet 11    [DISCONTINUED] metoprolol succinate XL (TOPROL-XL) 25 MG 24 hr tablet TAKE 1 TABLET BY MOUTH ONCE DAILY 30 tablet 11      No current facility-administered medications on file prior to visit.       Patient has no known allergies.    Past Medical History:   Diagnosis Date    Coronary artery disease     Hyperlipidemia     Hypertension     Myocardial infarction        Social History     Socioeconomic History    Marital status:    Tobacco Use    Smoking status: Every Day     Packs/day: 1.00     Years: 30.00     Additional pack years: 0.00     Total pack years: 30.00     Types: Cigarettes    Smokeless tobacco: Former   Vaping Use    Vaping Use: Never used   Substance and Sexual Activity    Alcohol use: No    Drug use: No    Sexual activity: Defer       Family History   Problem Relation Age of Onset    Heart disease Mother     Cancer Mother     Hyperlipidemia Father     Hypertension Father        Review of Systems   Constitutional:  Negative for activity change, appetite change, chills, fatigue and fever.   HENT: Negative.  Negative for congestion.    Eyes: Negative.  Negative for visual disturbance.   Respiratory:  Positive for shortness of breath. Negative for apnea, cough, chest tightness and wheezing.    Cardiovascular:  Positive for chest pain, palpitations and leg swelling.   Gastrointestinal: Negative.  Negative for blood in stool.   Endocrine: Negative.  Negative for cold intolerance and heat intolerance.   Genitourinary: Negative.  Negative for hematuria.   Skin: Negative.  Negative for color change, rash and wound.   Allergic/Immunologic: Negative.  Negative for environmental allergies and food allergies.   Neurological:  Positive for dizziness, syncope and light-headedness. Negative for numbness and headaches.   Hematological: Negative.  Does not bruise/bleed easily.   Psychiatric/Behavioral:  Positive for sleep disturbance.        Objective   Vitals:    02/08/24 0904   BP: 113/79   BP Location: Left arm   Patient Position: Sitting   Cuff Size: Adult   Pulse: 118   SpO2: 98%   Weight: 94.3 kg (208 lb)   Height: 175.3 cm  "(69\")      /79 (BP Location: Left arm, Patient Position: Sitting, Cuff Size: Adult)   Pulse 118   Ht 175.3 cm (69\")   Wt 94.3 kg (208 lb)   SpO2 98%   BMI 30.72 kg/m²     Lab Results (most recent)       None            Physical Exam  Vitals and nursing note reviewed.   Constitutional:       General: He is not in acute distress.     Appearance: Normal appearance. He is well-developed.   HENT:      Head: Normocephalic and atraumatic.   Eyes:      General: No scleral icterus.        Right eye: No discharge.         Left eye: No discharge.      Conjunctiva/sclera: Conjunctivae normal.   Neck:      Vascular: No carotid bruit.   Cardiovascular:      Rate and Rhythm: Normal rate and regular rhythm.      Heart sounds: Normal heart sounds. No murmur heard.     No friction rub. No gallop.   Pulmonary:      Effort: Pulmonary effort is normal. No respiratory distress.      Breath sounds: Normal breath sounds. No wheezing or rales.   Chest:      Chest wall: No tenderness.   Musculoskeletal:      Right lower leg: No edema.      Left lower leg: No edema.   Skin:     General: Skin is warm and dry.      Coloration: Skin is not pale.      Findings: No erythema or rash.   Neurological:      Mental Status: He is alert and oriented to person, place, and time.      Cranial Nerves: No cranial nerve deficit.   Psychiatric:         Behavior: Behavior normal.         Procedure     ECG 12 Lead    Date/Time: 2/8/2024 9:06 AM  Performed by: Bolivar Poon PA    Authorized by: Bolivar Poon PA  Comparison: compared with previous ECG from 1/16/2023  Comments: EKG demonstrates sinus tachycardia at 102 bpm, incomplete right bundle branch block, possible left atrial enlargement with no acute ST changes             Assessment & Plan     Problems Addressed this Visit          Cardiac and Vasculature    Ischemic cardiomyopathy - Primary    Relevant Medications    metoprolol succinate XL (TOPROL-XL) 50 MG 24 hr tablet    Other " Relevant Orders    Adult Transthoracic Echo Complete W/ Cont if Necessary Per Protocol    Stress Test With Myocardial Perfusion One Day    Coronary artery disease involving native coronary artery of native heart with angina pectoris    Relevant Medications    metoprolol succinate XL (TOPROL-XL) 50 MG 24 hr tablet    Other Relevant Orders    Adult Transthoracic Echo Complete W/ Cont if Necessary Per Protocol    Stress Test With Myocardial Perfusion One Day    Chronic systolic congestive heart failure    Relevant Medications    metoprolol succinate XL (TOPROL-XL) 50 MG 24 hr tablet    Other Relevant Orders    Adult Transthoracic Echo Complete W/ Cont if Necessary Per Protocol    Stress Test With Myocardial Perfusion One Day       Pulmonary and Pneumonias    Shortness of breath    Relevant Orders    Adult Transthoracic Echo Complete W/ Cont if Necessary Per Protocol    Stress Test With Myocardial Perfusion One Day     Diagnoses         Codes Comments    Ischemic cardiomyopathy    -  Primary ICD-10-CM: I25.5  ICD-9-CM: 414.8     Chronic systolic congestive heart failure     ICD-10-CM: I50.22  ICD-9-CM: 428.22, 428.0     Coronary artery disease involving native coronary artery of native heart with angina pectoris     ICD-10-CM: I25.119  ICD-9-CM: 414.01, 413.9     Shortness of breath     ICD-10-CM: R06.02  ICD-9-CM: 786.05           Recommendation  1.  Patient is a 48-year-old male with continued chest pain, dyspnea and palpitations.  Patient has known coronary disease and continues to smoke.  He has taken nitroglycerin to improve chest discomfort.  Because of his symptoms, we would like to repeat testing to evaluate.    2.  Stress test will be ordered for ischemia assessment.  Echo to reassess LV function in the setting of cardiomyopathy and symptoms.    3.  I am stopping Entresto.  His blood pressure has been so low and he has been having dizziness upon standing.  We will continue metoprolol and monitor blood pressure  closely.  Patient has been presyncopal upon standing, and we feel that medication needs to be adjusted to improve symptoms.    4.  Otherwise, patient is euvolemic in regards to heart failure and we will continue medications otherwise.  We will see him back for follow-up on above testing.  Any chest pain, not resolved by nitroglycerin, as discussed with the patient, we recommend ER evaluation.  Follow-up with primary as scheduled.             Navi Hanley  reports that he has been smoking cigarettes. He has a 30.00 pack-year smoking history. He has quit using smokeless tobacco.. I have educated him on the risk of diseases from using tobacco products such as cancer, COPD, and heart disease.     I advised him to quit and he is not willing to quit.    I spent 3  minutes counseling the patient.        .Patient brought in medicine list to appointment, it's been reviewed with patient and med list was updated in the chart.      Electronically signed by:

## 2024-02-08 NOTE — LETTER
February 8, 2024       No Recipients    Patient: Navi Hanley   YOB: 1975   Date of Visit: 2/8/2024       Dear SAIGE Kimble    Navi Hanley was in my office today. Below is a copy of my note.    If you have questions, please do not hesitate to call me. I look forward to following Navi along with you.         Sincerely,        GARTH Lee        CC:   No Recipients    Problem list     Subjective  Navi Hanley is a 48 y.o. male     Chief Complaint   Patient presents with   • Follow-up     6 months   Problem list  1.  Coronary artery disease  1.1 cardiac catheterization April 2019 demonstrated high-grade three-vessel disease with an EF of 30% with recommendations for coronary artery bypass grafting  1.2 coronary artery bypass grafting, three-vessel, April 2019 per patient report, inadequate data  1.3.  Stress test 7/20: Severely depressed post stress ejection fraction of 27% with global hypokinesis and basilar inferior lateral lateral akinesis positive scintigraphic the defect compatible with a relatively large posterior lateral infarct and mild to moderate pacheco-infarct ischemia  1.4 left heart catheterization 8/20: Patient with occluded LAD but patent LIMA to LAD and patent vein graft to the diagonal.  Nonobstructive circumflex and RCA.  PTCA of the diagonal for medically refractory angina but residual stenosis noted.  1.5 cardiac catheterization August 2021 with patent grafts noted with native vessel disease with medical management recommended and an EF of 55%  2.  Ischemic cardiomyopathy posterior lateral infarct with mild to moderate pacheco-infarct ischemia post-rest ejection fraction of 27%  2.1 EF historically around 30%  2.2 most recent evaluation by echocardiogram and catheterization was normal at 50-55%  3.  Dyslipidemia  4.  Chronic tobacco use       HPI    Patient is a 48-year-old male who presents back to the office for routine cardiac assessment.    Patient has  history of coronary disease as well as ischemic cardiomyopathy with most recent evaluation of is seem to suggest normalization.    Patient has chronic symptoms.  He currently has chest pain, dyspnea and palpitations.  Before and even after his catheterization, he continued to have symptoms despite medical therapy.    On discussion, patient does not describe his chest pain being any more severe than what has been in the past.  He takes nitroglycerin occasionally for chest pain.  He also is short of breath that is significant.  However, patient continues to smoke.  He does not describe PND or orthopnea.    He does palpitate.  He will feel a fluttering sensation at times.  Last event monitor showed premature beats with PACs and PVCs.  No malignant arrhythmia was identified.    Patient does occasionally have lightheadedness upon standing.  Patient has been on metoprolol and Entresto.  His blood pressure has been low.  Last office visit was 94/67 mmHg.  He is stable otherwise.      Current Outpatient Medications on File Prior to Visit   Medication Sig Dispense Refill   • Aspirin Low Dose 81 MG EC tablet Take 1 tablet by mouth Daily. 30 tablet 5   • atorvastatin (LIPITOR) 80 MG tablet Take 1 tablet by mouth Every Night. 30 tablet 5   • cetirizine (zyrTEC) 10 MG tablet Take 1 tablet by mouth Daily.     • clopidogrel (PLAVIX) 75 MG tablet TAKE 1 TABLET BY MOUTH ONCE DAILY 30 tablet 11   • cyclobenzaprine (FLEXERIL) 10 MG tablet TAKE ONE TABLET BY MOUTH TWICE A DAY (Patient taking differently: 3 (Three) Times a Day.) 60 tablet 0   • furosemide (LASIX) 20 MG tablet Take 1 tablet by mouth Daily. 30 tablet 11   • MAPAP 325 MG tablet 1 tablet As Needed.     • naproxen (NAPROSYN) 500 MG tablet TAKE 1 TABLET BY MOUTH TWO TIMES A DAY WITH MEALS 60 tablet 0   • nitroglycerin (NITROSTAT) 0.4 MG SL tablet 1 under the tongue as needed for angina, may repeat q5mins for up three doses 100 tablet 11   • potassium chloride 10 MEQ CR tablet  Take 1 tablet by mouth 2 (Two) Times a Day. 60 tablet 5   • ranolazine (RANEXA) 1000 MG 12 hr tablet TAKE 1 TABLET BY MOUTH TWICE A DAY 60 tablet 11   • SENNA PLUS 8.6-50 MG per tablet 2 (Two) Times a Day.     • [DISCONTINUED] Entresto 24-26 MG tablet TAKE 1 TABLET BY MOUTH TWICE A DAY 60 tablet 11   • [DISCONTINUED] metoprolol succinate XL (TOPROL-XL) 25 MG 24 hr tablet TAKE 1 TABLET BY MOUTH ONCE DAILY 30 tablet 11     No current facility-administered medications on file prior to visit.       Patient has no known allergies.    Past Medical History:   Diagnosis Date   • Coronary artery disease    • Hyperlipidemia    • Hypertension    • Myocardial infarction        Social History     Socioeconomic History   • Marital status:    Tobacco Use   • Smoking status: Every Day     Packs/day: 1.00     Years: 30.00     Additional pack years: 0.00     Total pack years: 30.00     Types: Cigarettes   • Smokeless tobacco: Former   Vaping Use   • Vaping Use: Never used   Substance and Sexual Activity   • Alcohol use: No   • Drug use: No   • Sexual activity: Defer       Family History   Problem Relation Age of Onset   • Heart disease Mother    • Cancer Mother    • Hyperlipidemia Father    • Hypertension Father        Review of Systems   Constitutional:  Negative for activity change, appetite change, chills, fatigue and fever.   HENT: Negative.  Negative for congestion.    Eyes: Negative.  Negative for visual disturbance.   Respiratory:  Positive for shortness of breath. Negative for apnea, cough, chest tightness and wheezing.    Cardiovascular:  Positive for chest pain, palpitations and leg swelling.   Gastrointestinal: Negative.  Negative for blood in stool.   Endocrine: Negative.  Negative for cold intolerance and heat intolerance.   Genitourinary: Negative.  Negative for hematuria.   Skin: Negative.  Negative for color change, rash and wound.   Allergic/Immunologic: Negative.  Negative for environmental allergies and  "food allergies.   Neurological:  Positive for dizziness, syncope and light-headedness. Negative for numbness and headaches.   Hematological: Negative.  Does not bruise/bleed easily.   Psychiatric/Behavioral:  Positive for sleep disturbance.        Objective  Vitals:    02/08/24 0904   BP: 113/79   BP Location: Left arm   Patient Position: Sitting   Cuff Size: Adult   Pulse: 118   SpO2: 98%   Weight: 94.3 kg (208 lb)   Height: 175.3 cm (69\")      /79 (BP Location: Left arm, Patient Position: Sitting, Cuff Size: Adult)   Pulse 118   Ht 175.3 cm (69\")   Wt 94.3 kg (208 lb)   SpO2 98%   BMI 30.72 kg/m²     Lab Results (most recent)       None            Physical Exam  Vitals and nursing note reviewed.   Constitutional:       General: He is not in acute distress.     Appearance: Normal appearance. He is well-developed.   HENT:      Head: Normocephalic and atraumatic.   Eyes:      General: No scleral icterus.        Right eye: No discharge.         Left eye: No discharge.      Conjunctiva/sclera: Conjunctivae normal.   Neck:      Vascular: No carotid bruit.   Cardiovascular:      Rate and Rhythm: Normal rate and regular rhythm.      Heart sounds: Normal heart sounds. No murmur heard.     No friction rub. No gallop.   Pulmonary:      Effort: Pulmonary effort is normal. No respiratory distress.      Breath sounds: Normal breath sounds. No wheezing or rales.   Chest:      Chest wall: No tenderness.   Musculoskeletal:      Right lower leg: No edema.      Left lower leg: No edema.   Skin:     General: Skin is warm and dry.      Coloration: Skin is not pale.      Findings: No erythema or rash.   Neurological:      Mental Status: He is alert and oriented to person, place, and time.      Cranial Nerves: No cranial nerve deficit.   Psychiatric:         Behavior: Behavior normal.         Procedure    ECG 12 Lead    Date/Time: 2/8/2024 9:06 AM  Performed by: Bolivar Poon PA    Authorized by: Bolivar Poon PA  " Comparison: compared with previous ECG from 1/16/2023  Comments: EKG demonstrates sinus tachycardia at 102 bpm, incomplete right bundle branch block, possible left atrial enlargement with no acute ST changes             Assessment & Plan    Problems Addressed this Visit          Cardiac and Vasculature    Ischemic cardiomyopathy - Primary    Relevant Medications    metoprolol succinate XL (TOPROL-XL) 50 MG 24 hr tablet    Other Relevant Orders    Adult Transthoracic Echo Complete W/ Cont if Necessary Per Protocol    Stress Test With Myocardial Perfusion One Day    Coronary artery disease involving native coronary artery of native heart with angina pectoris    Relevant Medications    metoprolol succinate XL (TOPROL-XL) 50 MG 24 hr tablet    Other Relevant Orders    Adult Transthoracic Echo Complete W/ Cont if Necessary Per Protocol    Stress Test With Myocardial Perfusion One Day    Chronic systolic congestive heart failure    Relevant Medications    metoprolol succinate XL (TOPROL-XL) 50 MG 24 hr tablet    Other Relevant Orders    Adult Transthoracic Echo Complete W/ Cont if Necessary Per Protocol    Stress Test With Myocardial Perfusion One Day       Pulmonary and Pneumonias    Shortness of breath    Relevant Orders    Adult Transthoracic Echo Complete W/ Cont if Necessary Per Protocol    Stress Test With Myocardial Perfusion One Day     Diagnoses         Codes Comments    Ischemic cardiomyopathy    -  Primary ICD-10-CM: I25.5  ICD-9-CM: 414.8     Chronic systolic congestive heart failure     ICD-10-CM: I50.22  ICD-9-CM: 428.22, 428.0     Coronary artery disease involving native coronary artery of native heart with angina pectoris     ICD-10-CM: I25.119  ICD-9-CM: 414.01, 413.9     Shortness of breath     ICD-10-CM: R06.02  ICD-9-CM: 786.05           Recommendation  1.  Patient is a 48-year-old male with continued chest pain, dyspnea and palpitations.  Patient has known coronary disease and continues to smoke.  He  has taken nitroglycerin to improve chest discomfort.  Because of his symptoms, we would like to repeat testing to evaluate.    2.  Stress test will be ordered for ischemia assessment.  Echo to reassess LV function in the setting of cardiomyopathy and symptoms.    3.  I am stopping Entresto.  His blood pressure has been so low and he has been having dizziness upon standing.  We will continue metoprolol and monitor blood pressure closely.  Patient has been presyncopal upon standing, and we feel that medication needs to be adjusted to improve symptoms.    4.  Otherwise, patient is euvolemic in regards to heart failure and we will continue medications otherwise.  We will see him back for follow-up on above testing.  Any chest pain, not resolved by nitroglycerin, as discussed with the patient, we recommend ER evaluation.  Follow-up with primary as scheduled.             Navi Hanley  reports that he has been smoking cigarettes. He has a 30.00 pack-year smoking history. He has quit using smokeless tobacco.. I have educated him on the risk of diseases from using tobacco products such as cancer, COPD, and heart disease.     I advised him to quit and he is not willing to quit.    I spent 3  minutes counseling the patient.        .Patient brought in medicine list to appointment, it's been reviewed with patient and med list was updated in the chart.      Electronically signed by:

## 2024-03-14 ENCOUNTER — HOSPITAL ENCOUNTER (OUTPATIENT)
Dept: CARDIOLOGY | Facility: HOSPITAL | Age: 49
Discharge: HOME | End: 2024-03-14
Payer: COMMERCIAL

## 2024-03-14 DIAGNOSIS — I25.5 ISCHEMIC CARDIOMYOPATHY: ICD-10-CM

## 2024-03-14 DIAGNOSIS — R06.02 SHORTNESS OF BREATH: ICD-10-CM

## 2024-03-14 DIAGNOSIS — I50.22 CHRONIC SYSTOLIC CONGESTIVE HEART FAILURE: ICD-10-CM

## 2024-03-14 DIAGNOSIS — I25.119 CORONARY ARTERY DISEASE INVOLVING NATIVE CORONARY ARTERY OF NATIVE HEART WITH ANGINA PECTORIS: ICD-10-CM

## 2024-03-14 LAB
BH CV ECHO MEAS - ACS: 2.05 CM
BH CV ECHO MEAS - AO MAX PG: 4.4 MMHG
BH CV ECHO MEAS - AO MEAN PG: 3 MMHG
BH CV ECHO MEAS - AO ROOT DIAM: 3.1 CM
BH CV ECHO MEAS - AO V2 MAX: 105 CM/SEC
BH CV ECHO MEAS - AO V2 VTI: 17.1 CM
BH CV ECHO MEAS - EDV(CUBED): 113.4 ML
BH CV ECHO MEAS - EDV(MOD-SP4): 77.4 ML
BH CV ECHO MEAS - EF(MOD-SP4): 52.1 %
BH CV ECHO MEAS - EF_3D-VOL: 49 %
BH CV ECHO MEAS - ESV(CUBED): 79.5 ML
BH CV ECHO MEAS - ESV(MOD-SP4): 37.1 ML
BH CV ECHO MEAS - FS: 11.2 %
BH CV ECHO MEAS - IVS/LVPW: 1.36 CM
BH CV ECHO MEAS - IVSD: 1.29 CM
BH CV ECHO MEAS - LA DIMENSION: 3.9 CM
BH CV ECHO MEAS - LAT PEAK E' VEL: 9.9 CM/SEC
BH CV ECHO MEAS - LV DIASTOLIC VOL/BSA (35-75): 36.9 CM2
BH CV ECHO MEAS - LV MASS(C)D: 201.3 GRAMS
BH CV ECHO MEAS - LV SYSTOLIC VOL/BSA (12-30): 17.7 CM2
BH CV ECHO MEAS - LVIDD: 4.8 CM
BH CV ECHO MEAS - LVIDS: 4.3 CM
BH CV ECHO MEAS - LVPWD: 0.95 CM
BH CV ECHO MEAS - MED PEAK E' VEL: 6.5 CM/SEC
BH CV ECHO MEAS - MV A MAX VEL: 63.8 CM/SEC
BH CV ECHO MEAS - MV DEC SLOPE: 210 CM/SEC2
BH CV ECHO MEAS - MV E MAX VEL: 54.4 CM/SEC
BH CV ECHO MEAS - MV E/A: 0.85
BH CV ECHO MEAS - RVDD: 3.1 CM
BH CV ECHO MEAS - SI(MOD-SP4): 19.2 ML/M2
BH CV ECHO MEAS - SV(MOD-SP4): 40.3 ML
BH CV ECHO MEASUREMENTS AVERAGE E/E' RATIO: 6.63
BH CV REST NUCLEAR ISOTOPE DOSE: 10 MCI
BH CV STRESS COMMENTS STAGE 1: NORMAL
BH CV STRESS DOSE REGADENOSON STAGE 1: 0.4
BH CV STRESS DURATION MIN STAGE 1: 0
BH CV STRESS DURATION SEC STAGE 1: 10
BH CV STRESS NUCLEAR ISOTOPE DOSE: 30 MCI
BH CV STRESS PROTOCOL 1: NORMAL
BH CV STRESS RECOVERY BP: NORMAL MMHG
BH CV STRESS RECOVERY HR: 96 BPM
BH CV STRESS STAGE 1: 1
LEFT ATRIUM VOLUME INDEX: 16.3 ML/M2
MAXIMAL PREDICTED HEART RATE: 169 BPM
PERCENT MAX PREDICTED HR: 62.13 %
STRESS BASELINE BP: NORMAL MMHG
STRESS BASELINE HR: 89 BPM
STRESS PERCENT HR: 73 %
STRESS POST PEAK BP: NORMAL MMHG
STRESS POST PEAK HR: 105 BPM
STRESS TARGET HR: 144 BPM

## 2024-03-14 PROCEDURE — 0 TECHNETIUM SESTAMIBI: Performed by: INTERNAL MEDICINE

## 2024-03-14 PROCEDURE — A9500 TC99M SESTAMIBI: HCPCS | Performed by: INTERNAL MEDICINE

## 2024-03-14 PROCEDURE — 93017 CV STRESS TEST TRACING ONLY: CPT

## 2024-03-14 PROCEDURE — 25010000002 REGADENOSON 0.4 MG/5ML SOLUTION: Performed by: INTERNAL MEDICINE

## 2024-03-14 PROCEDURE — 78452 HT MUSCLE IMAGE SPECT MULT: CPT

## 2024-03-14 PROCEDURE — 93306 TTE W/DOPPLER COMPLETE: CPT

## 2024-03-14 RX ORDER — REGADENOSON 0.08 MG/ML
0.4 INJECTION, SOLUTION INTRAVENOUS
Status: COMPLETED | OUTPATIENT
Start: 2024-03-14 | End: 2024-03-14

## 2024-03-14 RX ADMIN — TECHNETIUM TC 99M SESTAMIBI 1 DOSE: 1 INJECTION INTRAVENOUS at 10:00

## 2024-03-14 RX ADMIN — REGADENOSON 0.4 MG: 0.08 INJECTION, SOLUTION INTRAVENOUS at 11:53

## 2024-03-14 RX ADMIN — TECHNETIUM TC 99M SESTAMIBI 1 DOSE: 1 INJECTION INTRAVENOUS at 11:53

## 2024-03-18 ENCOUNTER — TELEPHONE (OUTPATIENT)
Dept: CARDIOLOGY | Facility: CLINIC | Age: 49
End: 2024-03-18
Payer: COMMERCIAL

## 2024-03-18 NOTE — TELEPHONE ENCOUNTER
--Stress Test With Myocardial Perfusion One Day --- Message from GARTH Mittal sent at 3/18/2024  1:17 PM EDT -----  1 week follow-up      Patient aware will be called with surekha date and time.

## 2024-03-25 ENCOUNTER — OFFICE VISIT (OUTPATIENT)
Dept: CARDIOLOGY | Facility: CLINIC | Age: 49
End: 2024-03-25
Payer: COMMERCIAL

## 2024-03-25 VITALS
SYSTOLIC BLOOD PRESSURE: 124 MMHG | WEIGHT: 216 LBS | DIASTOLIC BLOOD PRESSURE: 88 MMHG | HEIGHT: 69 IN | BODY MASS INDEX: 31.99 KG/M2 | OXYGEN SATURATION: 97 % | HEART RATE: 109 BPM

## 2024-03-25 DIAGNOSIS — I25.5 ISCHEMIC CARDIOMYOPATHY: ICD-10-CM

## 2024-03-25 DIAGNOSIS — I73.9 INTERMITTENT CLAUDICATION: ICD-10-CM

## 2024-03-25 DIAGNOSIS — I25.119 CORONARY ARTERY DISEASE INVOLVING NATIVE CORONARY ARTERY OF NATIVE HEART WITH ANGINA PECTORIS: Primary | ICD-10-CM

## 2024-03-25 DIAGNOSIS — I50.22 CHRONIC SYSTOLIC CONGESTIVE HEART FAILURE: ICD-10-CM

## 2024-03-25 PROCEDURE — 3079F DIAST BP 80-89 MM HG: CPT | Performed by: PHYSICIAN ASSISTANT

## 2024-03-25 PROCEDURE — 99214 OFFICE O/P EST MOD 30 MIN: CPT | Performed by: PHYSICIAN ASSISTANT

## 2024-03-25 PROCEDURE — 3074F SYST BP LT 130 MM HG: CPT | Performed by: PHYSICIAN ASSISTANT

## 2024-03-25 RX ORDER — METOPROLOL SUCCINATE 100 MG/1
100 TABLET, EXTENDED RELEASE ORAL DAILY
Qty: 30 TABLET | Refills: 5 | Status: SHIPPED | OUTPATIENT
Start: 2024-03-25

## 2024-03-25 NOTE — LETTER
March 25, 2024       No Recipients    Patient: Navi Hanley   YOB: 1975   Date of Visit: 3/25/2024       Dear SAIGE Kimble    Navi Hanley was in my office today. Below is a copy of my note.    If you have questions, please do not hesitate to call me. I look forward to following Navi along with you.         Sincerely,        GARTH Lee        CC:   No Recipients    Problem list     Subjective  Navi Hanley is a 49 y.o. male     Chief Complaint   Patient presents with   • Testing follow up   Problem list  1.  Coronary artery disease  1.1 cardiac catheterization April 2019 demonstrated high-grade three-vessel disease with an EF of 30% with recommendations for coronary artery bypass grafting  1.2 coronary artery bypass grafting, three-vessel, April 2019 per patient report, inadequate data  1.3.  Stress test 7/20: Severely depressed post stress ejection fraction of 27% with global hypokinesis and basilar inferior lateral lateral akinesis positive scintigraphic the defect compatible with a relatively large posterior lateral infarct and mild to moderate pacheco-infarct ischemia  1.4 left heart catheterization 8/20: Patient with occluded LAD but patent LIMA to LAD and patent vein graft to the diagonal.  Nonobstructive circumflex and RCA.  PTCA of the diagonal for medically refractory angina but residual stenosis noted.  1.5 cardiac catheterization August 2021 with patent grafts noted with native vessel disease with medical management recommended and an EF of 55%  1.6 stress test March 2024 demonstrates a fixed inferolateral wall defect extending to the apex compatible with infarct.  There is moderate pacheco-infarct ischemia.  There is a small to moderate reversible distal anterior and anteroapical defect compatible with ischemia.  EF estimated at 38%.   2.  Moderately depressed post stress ejection fraction of 38% with global hypokinesis most pronounced in the septum.  2.  Ischemic  cardiomyopathy posterior lateral infarct with mild to moderate pacheco-infarct ischemia post-rest ejection fraction of 27%  2.1 EF historically around 30%  2.2 most recent evaluation by echocardiogram and catheterization was normal at 50-55%  2.3 by stress test March 2024 at 38%  3.  Dyslipidemia  4.  Chronic tobacco use       HPI    Patient is a 49-year-old male who presents back to the office for routine cardiac assessment.  As above, patient has coronary artery disease with bypass grafting performed in 2019.  Patient's most recent catheterization was in 2021 with patent grafts were noted with significant native vessel disease but medical management recommended.    Patient has been treated medically.  He has chronic chest pain and shortness of breath.  Patient had continued to smoke.  Patient's medications have been adjusted.  Recently, we schedule stress testing because of symptoms showing not only infarct with significant pacheco-infarct ischemia but another area of ischemia distant from the infarct in the anterior wall.    He continues to feel chest discomfort.  He will get this occasionally near the substernal region.  He does not appear to be severe but still present.  He also has a significant amount of exertional dyspnea.  His dyspnea is mild to moderate when exerting and doing activity at baseline.  He does not describe any severe exertional dyspnea but his chest pain and shortness of breath are still present if not concerning.  He does not describe PND or orthopnea.    He may palpitate occasionally or sense a flutter type sensation.  He does not describe that consistently.  He does not describe dizziness, presyncope or syncope.  He does not describe any strokelike symptoms.  He is stable otherwise.      Current Outpatient Medications on File Prior to Visit   Medication Sig Dispense Refill   • Aspirin Low Dose 81 MG EC tablet Take 1 tablet by mouth Daily. 30 tablet 5   • atorvastatin (LIPITOR) 80 MG tablet Take  1 tablet by mouth Every Night. 30 tablet 5   • cetirizine (zyrTEC) 10 MG tablet Take 1 tablet by mouth Daily.     • clopidogrel (PLAVIX) 75 MG tablet TAKE 1 TABLET BY MOUTH ONCE DAILY 30 tablet 11   • cyclobenzaprine (FLEXERIL) 10 MG tablet TAKE ONE TABLET BY MOUTH TWICE A DAY (Patient taking differently: 3 (Three) Times a Day.) 60 tablet 0   • furosemide (LASIX) 20 MG tablet Take 1 tablet by mouth Daily. 30 tablet 11   • MAPAP 325 MG tablet 1 tablet As Needed.     • naproxen (NAPROSYN) 500 MG tablet TAKE 1 TABLET BY MOUTH TWO TIMES A DAY WITH MEALS 60 tablet 0   • nitroglycerin (NITROSTAT) 0.4 MG SL tablet 1 under the tongue as needed for angina, may repeat q5mins for up three doses 100 tablet 11   • potassium chloride 10 MEQ CR tablet Take 1 tablet by mouth 2 (Two) Times a Day. 60 tablet 5   • ranolazine (RANEXA) 1000 MG 12 hr tablet TAKE 1 TABLET BY MOUTH TWICE A DAY 60 tablet 11   • SENNA PLUS 8.6-50 MG per tablet 2 (Two) Times a Day.     • [DISCONTINUED] metoprolol succinate XL (TOPROL-XL) 50 MG 24 hr tablet Take 1 tablet by mouth Daily. 30 tablet 11     No current facility-administered medications on file prior to visit.       Patient has no known allergies.    Past Medical History:   Diagnosis Date   • Coronary artery disease    • Hyperlipidemia    • Hypertension    • Myocardial infarction        Social History     Socioeconomic History   • Marital status:    Tobacco Use   • Smoking status: Every Day     Current packs/day: 1.00     Average packs/day: 1 pack/day for 30.0 years (30.0 ttl pk-yrs)     Types: Cigarettes   • Smokeless tobacco: Former   Vaping Use   • Vaping status: Never Used   Substance and Sexual Activity   • Alcohol use: No   • Drug use: No   • Sexual activity: Defer       Family History   Problem Relation Age of Onset   • Heart disease Mother    • Cancer Mother    • Hyperlipidemia Father    • Hypertension Father        Review of Systems   Constitutional:  Negative for activity change,  "appetite change, chills, fatigue and fever.   HENT: Negative.  Negative for congestion, sinus pressure and sinus pain.    Eyes: Negative.  Negative for visual disturbance.   Respiratory:  Positive for chest tightness and shortness of breath. Negative for apnea, cough and wheezing.    Cardiovascular:  Positive for chest pain and palpitations. Negative for leg swelling.   Gastrointestinal: Negative.  Negative for blood in stool.   Endocrine: Negative.  Negative for cold intolerance and heat intolerance.   Genitourinary: Negative.  Negative for hematuria.   Musculoskeletal: Negative.  Negative for gait problem.   Skin: Negative.  Negative for color change, rash and wound.   Allergic/Immunologic: Negative.  Negative for environmental allergies and food allergies.   Neurological:  Positive for dizziness, weakness and light-headedness. Negative for syncope, numbness and headaches.   Hematological:  Bruises/bleeds easily.   Psychiatric/Behavioral: Negative.  Negative for sleep disturbance.        Objective  Vitals:    03/25/24 0845   BP: 124/88   BP Location: Left arm   Patient Position: Sitting   Cuff Size: Adult   Pulse: 109   SpO2: 97%   Weight: 98 kg (216 lb)   Height: 175.3 cm (69\")      /88 (BP Location: Left arm, Patient Position: Sitting, Cuff Size: Adult)   Pulse 109   Ht 175.3 cm (69\")   Wt 98 kg (216 lb)   SpO2 97%   BMI 31.90 kg/m²     Lab Results (most recent)       None            Physical Exam  Vitals and nursing note reviewed.   Constitutional:       General: He is not in acute distress.     Appearance: Normal appearance. He is well-developed.   HENT:      Head: Normocephalic and atraumatic.   Eyes:      General: No scleral icterus.        Right eye: No discharge.         Left eye: No discharge.      Conjunctiva/sclera: Conjunctivae normal.   Neck:      Vascular: No carotid bruit.   Cardiovascular:      Rate and Rhythm: Normal rate and regular rhythm.      Heart sounds: Normal heart sounds. No " murmur heard.     No friction rub. No gallop.   Pulmonary:      Effort: Pulmonary effort is normal. No respiratory distress.      Breath sounds: Normal breath sounds. No wheezing or rales.   Chest:      Chest wall: No tenderness.   Musculoskeletal:      Right lower leg: No edema.      Left lower leg: No edema.   Skin:     General: Skin is warm and dry.      Coloration: Skin is not pale.      Findings: No erythema or rash.   Neurological:      Mental Status: He is alert and oriented to person, place, and time.      Cranial Nerves: No cranial nerve deficit.   Psychiatric:         Behavior: Behavior normal.         Procedure  Procedures       Assessment & Plan    Problems Addressed this Visit          Cardiac and Vasculature    Ischemic cardiomyopathy    Relevant Medications    metoprolol succinate XL (TOPROL-XL) 100 MG 24 hr tablet    Other Relevant Orders    [facility] Cath    CBC & Differential    Comprehensive Metabolic Panel    D-dimer, Quantitative    proBNP    Coronary artery disease involving native coronary artery of native heart with angina pectoris - Primary    Relevant Medications    metoprolol succinate XL (TOPROL-XL) 100 MG 24 hr tablet    Other Relevant Orders    Duplex Lower Extremity Art / Grafts - Bilateral CAR    [facility] Cath    CBC & Differential    Comprehensive Metabolic Panel    D-dimer, Quantitative    proBNP    Chronic systolic congestive heart failure    Relevant Medications    metoprolol succinate XL (TOPROL-XL) 100 MG 24 hr tablet    Other Relevant Orders    [facility] Cath    CBC & Differential    Comprehensive Metabolic Panel    D-dimer, Quantitative    proBNP     Other Visit Diagnoses       Intermittent claudication        Relevant Orders    Duplex Lower Extremity Art / Grafts - Bilateral CAR    [facility] Cath    CBC & Differential    Comprehensive Metabolic Panel    D-dimer, Quantitative    proBNP          Diagnoses         Codes Comments    Coronary artery  disease involving native coronary artery of native heart with angina pectoris    -  Primary ICD-10-CM: I25.119  ICD-9-CM: 414.01, 413.9     Ischemic cardiomyopathy     ICD-10-CM: I25.5  ICD-9-CM: 414.8     Chronic systolic congestive heart failure     ICD-10-CM: I50.22  ICD-9-CM: 428.22, 428.0     Intermittent claudication     ICD-10-CM: I73.9  ICD-9-CM: 443.9           Recommendation  1.  Patient is a 49-year-old male presenting back to the office for follow-up with history of coronary disease.  He had a stress test showing infarct with significant amount of pacheco-infarct ischemia but also a significant anterior ischemic defect as well.  He continues to feel significant chest pain and dyspnea despite the fact that he has been on antianginal therapy.  Because of this, we will schedule for catheterization for definitive assessment.    2.  To treat cardiomyopathy and continue treatment for baseline failure, I am increasing his metoprolol.  Will continue other current medical regimen.  We will continue high-dose statin.    3.  Patient describes numbness in the lower extremities and possibly weakness or pain.  I am scheduling for arterial duplex to evaluate circulation in the lower extremities.    4.  Otherwise, if symptoms were to worsen, I want him to call the office.  Patient is on antianginal therapy.  He has nitroglycerin prescribed as needed for chest pain.  Any chest pain, not resolved by nitroglycerin, I want him to go to the ER.    5.   we will see patient back for follow-up after catheterization and recommend further.  Follow-up with primary as scheduled.             Navi Dayan  reports that he has been smoking cigarettes. He has a 30 pack-year smoking history. He has quit using smokeless tobacco. I have educated him on the risk of diseases from using tobacco products such as cancer, COPD, and heart disease.     I advised him to quit and he is not willing to quit.    I spent 3  minutes counseling the  patient.          Patient did not bring med list or medicine bottles to appointment, med list has been reviewed and updated based on patient's knowledge of their meds.    Electronically signed by:

## 2024-03-25 NOTE — PROGRESS NOTES
Problem list     Subjective   Navi Hanley is a 49 y.o. male     Chief Complaint   Patient presents with    Testing follow up   Problem list  1.  Coronary artery disease  1.1 cardiac catheterization April 2019 demonstrated high-grade three-vessel disease with an EF of 30% with recommendations for coronary artery bypass grafting  1.2 coronary artery bypass grafting, three-vessel, April 2019 per patient report, inadequate data  1.3.  Stress test 7/20: Severely depressed post stress ejection fraction of 27% with global hypokinesis and basilar inferior lateral lateral akinesis positive scintigraphic the defect compatible with a relatively large posterior lateral infarct and mild to moderate pacheco-infarct ischemia  1.4 left heart catheterization 8/20: Patient with occluded LAD but patent LIMA to LAD and patent vein graft to the diagonal.  Nonobstructive circumflex and RCA.  PTCA of the diagonal for medically refractory angina but residual stenosis noted.  1.5 cardiac catheterization August 2021 with patent grafts noted with native vessel disease with medical management recommended and an EF of 55%  1.6 stress test March 2024 demonstrates a fixed inferolateral wall defect extending to the apex compatible with infarct.  There is moderate pacheco-infarct ischemia.  There is a small to moderate reversible distal anterior and anteroapical defect compatible with ischemia.  EF estimated at 38%.   2.  Moderately depressed post stress ejection fraction of 38% with global hypokinesis most pronounced in the septum.  2.  Ischemic cardiomyopathy posterior lateral infarct with mild to moderate pacheco-infarct ischemia post-rest ejection fraction of 27%  2.1 EF historically around 30%  2.2 most recent evaluation by echocardiogram and catheterization was normal at 50-55%  2.3 by stress test March 2024 at 38%  3.  Dyslipidemia  4.  Chronic tobacco use       HPI    Patient is a 49-year-old male who presents back to the office for routine  cardiac assessment.  As above, patient has coronary artery disease with bypass grafting performed in 2019.  Patient's most recent catheterization was in 2021 with patent grafts were noted with significant native vessel disease but medical management recommended.    Patient has been treated medically.  He has chronic chest pain and shortness of breath.  Patient had continued to smoke.  Patient's medications have been adjusted.  Recently, we schedule stress testing because of symptoms showing not only infarct with significant pacheco-infarct ischemia but another area of ischemia distant from the infarct in the anterior wall.    He continues to feel chest discomfort.  He will get this occasionally near the substernal region.  He does not appear to be severe but still present.  He also has a significant amount of exertional dyspnea.  His dyspnea is mild to moderate when exerting and doing activity at baseline.  He does not describe any severe exertional dyspnea but his chest pain and shortness of breath are still present if not concerning.  He does not describe PND or orthopnea.    He may palpitate occasionally or sense a flutter type sensation.  He does not describe that consistently.  He does not describe dizziness, presyncope or syncope.  He does not describe any strokelike symptoms.  He is stable otherwise.      Current Outpatient Medications on File Prior to Visit   Medication Sig Dispense Refill    Aspirin Low Dose 81 MG EC tablet Take 1 tablet by mouth Daily. 30 tablet 5    atorvastatin (LIPITOR) 80 MG tablet Take 1 tablet by mouth Every Night. 30 tablet 5    cetirizine (zyrTEC) 10 MG tablet Take 1 tablet by mouth Daily.      clopidogrel (PLAVIX) 75 MG tablet TAKE 1 TABLET BY MOUTH ONCE DAILY 30 tablet 11    cyclobenzaprine (FLEXERIL) 10 MG tablet TAKE ONE TABLET BY MOUTH TWICE A DAY (Patient taking differently: 3 (Three) Times a Day.) 60 tablet 0    furosemide (LASIX) 20 MG tablet Take 1 tablet by mouth Daily. 30  tablet 11    MAPAP 325 MG tablet 1 tablet As Needed.      naproxen (NAPROSYN) 500 MG tablet TAKE 1 TABLET BY MOUTH TWO TIMES A DAY WITH MEALS 60 tablet 0    nitroglycerin (NITROSTAT) 0.4 MG SL tablet 1 under the tongue as needed for angina, may repeat q5mins for up three doses 100 tablet 11    potassium chloride 10 MEQ CR tablet Take 1 tablet by mouth 2 (Two) Times a Day. 60 tablet 5    ranolazine (RANEXA) 1000 MG 12 hr tablet TAKE 1 TABLET BY MOUTH TWICE A DAY 60 tablet 11    SENNA PLUS 8.6-50 MG per tablet 2 (Two) Times a Day.      [DISCONTINUED] metoprolol succinate XL (TOPROL-XL) 50 MG 24 hr tablet Take 1 tablet by mouth Daily. 30 tablet 11     No current facility-administered medications on file prior to visit.       Patient has no known allergies.    Past Medical History:   Diagnosis Date    Coronary artery disease     Hyperlipidemia     Hypertension     Myocardial infarction        Social History     Socioeconomic History    Marital status:    Tobacco Use    Smoking status: Every Day     Current packs/day: 1.00     Average packs/day: 1 pack/day for 30.0 years (30.0 ttl pk-yrs)     Types: Cigarettes    Smokeless tobacco: Former   Vaping Use    Vaping status: Never Used   Substance and Sexual Activity    Alcohol use: No    Drug use: No    Sexual activity: Defer       Family History   Problem Relation Age of Onset    Heart disease Mother     Cancer Mother     Hyperlipidemia Father     Hypertension Father        Review of Systems   Constitutional:  Negative for activity change, appetite change, chills, fatigue and fever.   HENT: Negative.  Negative for congestion, sinus pressure and sinus pain.    Eyes: Negative.  Negative for visual disturbance.   Respiratory:  Positive for chest tightness and shortness of breath. Negative for apnea, cough and wheezing.    Cardiovascular:  Positive for chest pain and palpitations. Negative for leg swelling.   Gastrointestinal: Negative.  Negative for blood in stool.  "  Endocrine: Negative.  Negative for cold intolerance and heat intolerance.   Genitourinary: Negative.  Negative for hematuria.   Musculoskeletal: Negative.  Negative for gait problem.   Skin: Negative.  Negative for color change, rash and wound.   Allergic/Immunologic: Negative.  Negative for environmental allergies and food allergies.   Neurological:  Positive for dizziness, weakness and light-headedness. Negative for syncope, numbness and headaches.   Hematological:  Bruises/bleeds easily.   Psychiatric/Behavioral: Negative.  Negative for sleep disturbance.        Objective   Vitals:    03/25/24 0845   BP: 124/88   BP Location: Left arm   Patient Position: Sitting   Cuff Size: Adult   Pulse: 109   SpO2: 97%   Weight: 98 kg (216 lb)   Height: 175.3 cm (69\")      /88 (BP Location: Left arm, Patient Position: Sitting, Cuff Size: Adult)   Pulse 109   Ht 175.3 cm (69\")   Wt 98 kg (216 lb)   SpO2 97%   BMI 31.90 kg/m²     Lab Results (most recent)       None            Physical Exam  Vitals and nursing note reviewed.   Constitutional:       General: He is not in acute distress.     Appearance: Normal appearance. He is well-developed.   HENT:      Head: Normocephalic and atraumatic.   Eyes:      General: No scleral icterus.        Right eye: No discharge.         Left eye: No discharge.      Conjunctiva/sclera: Conjunctivae normal.   Neck:      Vascular: No carotid bruit.   Cardiovascular:      Rate and Rhythm: Normal rate and regular rhythm.      Heart sounds: Normal heart sounds. No murmur heard.     No friction rub. No gallop.   Pulmonary:      Effort: Pulmonary effort is normal. No respiratory distress.      Breath sounds: Normal breath sounds. No wheezing or rales.   Chest:      Chest wall: No tenderness.   Musculoskeletal:      Right lower leg: No edema.      Left lower leg: No edema.   Skin:     General: Skin is warm and dry.      Coloration: Skin is not pale.      Findings: No erythema or rash. "   Neurological:      Mental Status: He is alert and oriented to person, place, and time.      Cranial Nerves: No cranial nerve deficit.   Psychiatric:         Behavior: Behavior normal.         Procedure   Procedures       Assessment & Plan     Problems Addressed this Visit          Cardiac and Vasculature    Ischemic cardiomyopathy    Relevant Medications    metoprolol succinate XL (TOPROL-XL) 100 MG 24 hr tablet    Other Relevant Orders    [facility]    CBC & Differential    Comprehensive Metabolic Panel    D-dimer, Quantitative    proBNP    Coronary artery disease involving native coronary artery of native heart with angina pectoris - Primary    Relevant Medications    metoprolol succinate XL (TOPROL-XL) 100 MG 24 hr tablet    Other Relevant Orders    Duplex Lower Extremity Art / Grafts - Bilateral CAR    [facility]    CBC & Differential    Comprehensive Metabolic Panel    D-dimer, Quantitative    proBNP    Chronic systolic congestive heart failure    Relevant Medications    metoprolol succinate XL (TOPROL-XL) 100 MG 24 hr tablet    Other Relevant Orders    [facility]    CBC & Differential    Comprehensive Metabolic Panel    D-dimer, Quantitative    proBNP     Other Visit Diagnoses       Intermittent claudication        Relevant Orders    Duplex Lower Extremity Art / Grafts - Bilateral CAR    [facility]    CBC & Differential    Comprehensive Metabolic Panel    D-dimer, Quantitative    proBNP          Diagnoses         Codes Comments    Coronary artery disease involving native coronary artery of native heart with angina pectoris    -  Primary ICD-10-CM: I25.119  ICD-9-CM: 414.01, 413.9     Ischemic cardiomyopathy     ICD-10-CM: I25.5  ICD-9-CM: 414.8     Chronic systolic congestive heart failure     ICD-10-CM: I50.22  ICD-9-CM: 428.22, 428.0     Intermittent claudication     ICD-10-CM: I73.9  ICD-9-CM: 443.9           Recommendation  1.  Patient is a 49-year-old male  presenting back to the office for follow-up with history of coronary disease.  He had a stress test showing infarct with significant amount of pacheco-infarct ischemia but also a significant anterior ischemic defect as well.  He continues to feel significant chest pain and dyspnea despite the fact that he has been on antianginal therapy.  Because of this, we will schedule for catheterization for definitive assessment.    2.  To treat cardiomyopathy and continue treatment for baseline failure, I am increasing his metoprolol.  Will continue other current medical regimen.  We will continue high-dose statin.    3.  Patient describes numbness in the lower extremities and possibly weakness or pain.  I am scheduling for arterial duplex to evaluate circulation in the lower extremities.    4.  Otherwise, if symptoms were to worsen, I want him to call the office.  Patient is on antianginal therapy.  He has nitroglycerin prescribed as needed for chest pain.  Any chest pain, not resolved by nitroglycerin, I want him to go to the ER.    5.   we will see patient back for follow-up after catheterization and recommend further.  Follow-up with primary as scheduled.             Navi Dayan  reports that he has been smoking cigarettes. He has a 30 pack-year smoking history. He has quit using smokeless tobacco. I have educated him on the risk of diseases from using tobacco products such as cancer, COPD, and heart disease.     I advised him to quit and he is not willing to quit.    I spent 3  minutes counseling the patient.          Patient did not bring med list or medicine bottles to appointment, med list has been reviewed and updated based on patient's knowledge of their meds.    Electronically signed by:

## 2024-03-28 ENCOUNTER — TELEPHONE (OUTPATIENT)
Dept: CARDIOLOGY | Facility: CLINIC | Age: 49
End: 2024-03-28
Payer: COMMERCIAL

## 2024-03-28 LAB
BH CV ECHO MEAS - ACS: 2.05 CM
BH CV ECHO MEAS - AO MAX PG: 4.4 MMHG
BH CV ECHO MEAS - AO MEAN PG: 3 MMHG
BH CV ECHO MEAS - AO ROOT DIAM: 3.1 CM
BH CV ECHO MEAS - AO V2 MAX: 105 CM/SEC
BH CV ECHO MEAS - AO V2 VTI: 17.1 CM
BH CV ECHO MEAS - EDV(CUBED): 113.4 ML
BH CV ECHO MEAS - EDV(MOD-SP4): 77.4 ML
BH CV ECHO MEAS - EF(MOD-SP4): 52.1 %
BH CV ECHO MEAS - EF_3D-VOL: 49 %
BH CV ECHO MEAS - ESV(CUBED): 79.5 ML
BH CV ECHO MEAS - ESV(MOD-SP4): 37.1 ML
BH CV ECHO MEAS - FS: 11.2 %
BH CV ECHO MEAS - IVS/LVPW: 1.36 CM
BH CV ECHO MEAS - IVSD: 1.29 CM
BH CV ECHO MEAS - LA DIMENSION: 3.9 CM
BH CV ECHO MEAS - LAT PEAK E' VEL: 9.9 CM/SEC
BH CV ECHO MEAS - LV DIASTOLIC VOL/BSA (35-75): 36.9 CM2
BH CV ECHO MEAS - LV MASS(C)D: 201.3 GRAMS
BH CV ECHO MEAS - LV SYSTOLIC VOL/BSA (12-30): 17.7 CM2
BH CV ECHO MEAS - LVIDD: 4.8 CM
BH CV ECHO MEAS - LVIDS: 4.3 CM
BH CV ECHO MEAS - LVPWD: 0.95 CM
BH CV ECHO MEAS - MED PEAK E' VEL: 6.5 CM/SEC
BH CV ECHO MEAS - MV A MAX VEL: 63.8 CM/SEC
BH CV ECHO MEAS - MV DEC SLOPE: 210 CM/SEC2
BH CV ECHO MEAS - MV E MAX VEL: 54.4 CM/SEC
BH CV ECHO MEAS - MV E/A: 0.85
BH CV ECHO MEAS - RVDD: 3.1 CM
BH CV ECHO MEAS - SI(MOD-SP4): 19.2 ML/M2
BH CV ECHO MEAS - SV(MOD-SP4): 40.3 ML
BH CV ECHO MEASUREMENTS AVERAGE E/E' RATIO: 6.63
LEFT ATRIUM VOLUME INDEX: 16.3 ML/M2

## 2024-03-28 NOTE — TELEPHONE ENCOUNTER
Preparing cath packet. Waiting on Echo to be read by Dr. Aguirre and then I will add the results to the packet. I will have cath packet at my desk until complete.

## 2024-04-09 ENCOUNTER — LAB (OUTPATIENT)
Dept: LAB | Facility: HOSPITAL | Age: 49
End: 2024-04-09
Payer: COMMERCIAL

## 2024-04-09 ENCOUNTER — HOSPITAL ENCOUNTER (OUTPATIENT)
Dept: CARDIOLOGY | Facility: HOSPITAL | Age: 49
Discharge: HOME | End: 2024-04-09
Payer: COMMERCIAL

## 2024-04-09 DIAGNOSIS — R06.02 SHORTNESS OF BREATH: ICD-10-CM

## 2024-04-09 DIAGNOSIS — I50.22 CHRONIC SYSTOLIC CONGESTIVE HEART FAILURE: ICD-10-CM

## 2024-04-09 DIAGNOSIS — I73.9 INTERMITTENT CLAUDICATION: ICD-10-CM

## 2024-04-09 DIAGNOSIS — I25.5 ISCHEMIC CARDIOMYOPATHY: ICD-10-CM

## 2024-04-09 DIAGNOSIS — I25.119 CORONARY ARTERY DISEASE INVOLVING NATIVE CORONARY ARTERY OF NATIVE HEART WITH ANGINA PECTORIS: ICD-10-CM

## 2024-04-09 DIAGNOSIS — I25.119 CORONARY ARTERY DISEASE INVOLVING NATIVE CORONARY ARTERY OF NATIVE HEART WITH ANGINA PECTORIS: Primary | ICD-10-CM

## 2024-04-09 LAB
ALBUMIN SERPL-MCNC: 4.2 G/DL (ref 3.5–5.2)
ALBUMIN/GLOB SERPL: 2 G/DL
ALP SERPL-CCNC: 86 U/L (ref 39–117)
ALT SERPL W P-5'-P-CCNC: 16 U/L (ref 1–41)
ANION GAP SERPL CALCULATED.3IONS-SCNC: 21.8 MMOL/L (ref 5–15)
AST SERPL-CCNC: 16 U/L (ref 1–40)
BILIRUB SERPL-MCNC: 0.5 MG/DL (ref 0–1.2)
BUN SERPL-MCNC: 10 MG/DL (ref 6–20)
BUN/CREAT SERPL: 8.3 (ref 7–25)
CALCIUM SPEC-SCNC: 9.4 MG/DL (ref 8.6–10.5)
CHLORIDE SERPL-SCNC: 100 MMOL/L (ref 98–107)
CO2 SERPL-SCNC: 22.2 MMOL/L (ref 22–29)
CREAT SERPL-MCNC: 1.21 MG/DL (ref 0.76–1.27)
D DIMER PPP FEU-MCNC: <0.27 MCGFEU/ML (ref 0–0.51)
EGFRCR SERPLBLD CKD-EPI 2021: 72.5 ML/MIN/1.73
GLOBULIN UR ELPH-MCNC: 2.1 GM/DL
GLUCOSE SERPL-MCNC: 120 MG/DL (ref 65–99)
NT-PROBNP SERPL-MCNC: 173.8 PG/ML (ref 0–900)
POTASSIUM SERPL-SCNC: 4.8 MMOL/L (ref 3.5–5.2)
PROT SERPL-MCNC: 6.3 G/DL (ref 6–8.5)
SODIUM SERPL-SCNC: 144 MMOL/L (ref 136–145)

## 2024-04-09 PROCEDURE — 85379 FIBRIN DEGRADATION QUANT: CPT

## 2024-04-09 PROCEDURE — 80053 COMPREHEN METABOLIC PANEL: CPT

## 2024-04-09 PROCEDURE — 83880 ASSAY OF NATRIURETIC PEPTIDE: CPT

## 2024-04-09 PROCEDURE — 36415 COLL VENOUS BLD VENIPUNCTURE: CPT

## 2024-04-09 PROCEDURE — 93925 LOWER EXTREMITY STUDY: CPT

## 2024-04-14 LAB
BH CV LEA LEFT ANT TIBIAL A DISTAL PSV: 42 CM/S
BH CV LEA LEFT CFA PROX PSV: 90 CM/S
BH CV LEA LEFT DFA PROX PSV: 59 CM/S
BH CV LEA LEFT POPITEAL A  PROX PSV: 50 CM/S
BH CV LEA LEFT PTA DISTAL PSV: 17 CM/S
BH CV LEA LEFT SFA DISTAL PSV: -49.6 CM/S
BH CV LEA LEFT SFA MID PSV: -64 CM/S
BH CV LEA LEFT SFA PROX PSV: -75.8 CM/S
BH CV LEA RIGHT ANT TIBIAL A DISTAL PSV: 43 CM/S
BH CV LEA RIGHT CFA PROX PSV: 92 CM/S
BH CV LEA RIGHT DFA PROX PSV: 54 CM/S
BH CV LEA RIGHT POPITEAL A  PROX PSV: 53 CM/S
BH CV LEA RIGHT PTA DISTAL PSV: 49 CM/S
BH CV LEA RIGHT SFA DISTAL PSV: -41.5 CM/S
BH CV LEA RIGHT SFA MID PSV: -71.3 CM/S
BH CV LEA RIGHT SFA PROX PSV: -70.2 CM/S
LEFT GROIN CFA SYS: 90.4 CM/SEC
RIGHT GROIN CFA SYS: 91.7 CM/SEC

## 2024-04-18 ENCOUNTER — TELEPHONE (OUTPATIENT)
Dept: CARDIOLOGY | Facility: CLINIC | Age: 49
End: 2024-04-18
Payer: COMMERCIAL

## 2024-04-18 NOTE — TELEPHONE ENCOUNTER
----- Message from RAFI Cantu sent at 4/18/2024 10:46 AM EDT -----    ----- Message -----  From: Bolivar Poon PA  Sent: 4/15/2024   1:35 PM EDT  To: RAFI Cantu    Routine follow-up

## 2024-05-06 ENCOUNTER — TELEPHONE (OUTPATIENT)
Dept: CARDIOLOGY | Facility: CLINIC | Age: 49
End: 2024-05-06
Payer: COMMERCIAL

## 2024-05-08 ENCOUNTER — TELEPHONE (OUTPATIENT)
Dept: CARDIOLOGY | Facility: CLINIC | Age: 49
End: 2024-05-08
Payer: COMMERCIAL

## 2024-05-15 ENCOUNTER — TELEPHONE (OUTPATIENT)
Dept: CARDIOLOGY | Facility: CLINIC | Age: 49
End: 2024-05-15
Payer: COMMERCIAL

## 2024-05-15 NOTE — TELEPHONE ENCOUNTER
Maame schwarz/Dr. Arteaga's office called inquiring about the pt's Cath authorization #.  Provided her with the auth # and faxed a copy of the auth to her Attn.

## 2024-05-30 ENCOUNTER — OFFICE VISIT (OUTPATIENT)
Dept: CARDIOLOGY | Facility: CLINIC | Age: 49
End: 2024-05-30
Payer: COMMERCIAL

## 2024-05-30 VITALS
SYSTOLIC BLOOD PRESSURE: 126 MMHG | WEIGHT: 210 LBS | OXYGEN SATURATION: 98 % | BODY MASS INDEX: 31.1 KG/M2 | DIASTOLIC BLOOD PRESSURE: 87 MMHG | HEIGHT: 69 IN

## 2024-05-30 DIAGNOSIS — I25.119 CORONARY ARTERY DISEASE INVOLVING NATIVE CORONARY ARTERY OF NATIVE HEART WITH ANGINA PECTORIS: ICD-10-CM

## 2024-05-30 DIAGNOSIS — I50.22 CHRONIC SYSTOLIC CONGESTIVE HEART FAILURE: ICD-10-CM

## 2024-05-30 DIAGNOSIS — R06.02 SHORTNESS OF BREATH: Primary | ICD-10-CM

## 2024-05-30 DIAGNOSIS — I25.5 ISCHEMIC CARDIOMYOPATHY: ICD-10-CM

## 2024-05-30 PROCEDURE — 99214 OFFICE O/P EST MOD 30 MIN: CPT | Performed by: PHYSICIAN ASSISTANT

## 2024-05-30 PROCEDURE — 3074F SYST BP LT 130 MM HG: CPT | Performed by: PHYSICIAN ASSISTANT

## 2024-05-30 PROCEDURE — 3079F DIAST BP 80-89 MM HG: CPT | Performed by: PHYSICIAN ASSISTANT

## 2024-05-30 NOTE — LETTER
May 30, 2024       No Recipients    Patient: Navi Hanley   YOB: 1975   Date of Visit: 5/30/2024       Dear SAIGE Kimble    Navi Hanley was in my office today. Below is a copy of my note.    If you have questions, please do not hesitate to call me. I look forward to following Navi along with you.         Sincerely,        GARTH Lee        CC:   No Recipients    Problem list     Subjective  Navi Hanley is a 49 y.o. male     Chief Complaint   Patient presents with   • Cath follow up   Problem list  1.  Coronary artery disease  1.1 cardiac catheterization April 2019 demonstrated high-grade three-vessel disease with an EF of 30% with recommendations for coronary artery bypass grafting  1.2 coronary artery bypass grafting, three-vessel, April 2019 per patient report, inadequate data  1.3.  Multiple catheterizations in the past  1.4 most recent catheterization was March 2024 in which patient had significant native vessel disease.  Patient had a patent LIMA to LAD and vein graft to the diagonal.  Nonobstructive disease of the RCA approaching 30%.  Multiple stents in the native circumflex with occluded vein graft to OM. 60 to 70% IntraStent stenosis of the circumflex with DFR at 1.0 with recommendations for medical management.  2.  Moderately depressed post stress ejection fraction of 38% with global hypokinesis most pronounced in the septum.  2.  Ischemic cardiomyopathy posterior lateral infarct with mild to moderate pacheco-infarct ischemia post-rest ejection fraction of 27%  2.1 EF historically around 30%  2.2 most recent echocardiogram March 2024 with an EF estimated near 40 to 45%   3.  Dyslipidemia  4.  Chronic tobacco use  5.  Chronic systolic heart failure  6.  Palpitations  6.1 event monitor in 2020 with PVCs and PACs noted.  No malignant arrhythmia identified.    HPI    Patient is a 49-year-old male presenting back to the office for routine assessment and for hospital  follow-up from cardiac catheterization.    Patient has done well.  He has occasional chest discomfort but his angina appears to be stable.  He does not describe any progressive chest discomfort at all.  He had recent catheterization with findings as above.    Patient has a degree of dyspnea that is moderate at best it seems.  His dyspnea has been a major concern.  He describes smoking since a teenager.  He has not been evaluated by pulmonology.  No complaints of PND or orthopnea.    He occasionally palpitates or senses a fluttering type sensation.  He wore event monitor in 2020 with PVCs and PACs noted.  No significant arrhythmia were identified at that time.  He feels stable.  He does not describe any strokelike symptoms.  Otherwise, patient is doing well.      Current Outpatient Medications on File Prior to Visit   Medication Sig Dispense Refill   • Aspirin Low Dose 81 MG EC tablet Take 1 tablet by mouth Daily. 30 tablet 5   • atorvastatin (LIPITOR) 80 MG tablet Take 1 tablet by mouth Every Night. 30 tablet 5   • cetirizine (zyrTEC) 10 MG tablet Take 1 tablet by mouth Daily.     • clopidogrel (PLAVIX) 75 MG tablet TAKE 1 TABLET BY MOUTH ONCE DAILY 30 tablet 11   • cyclobenzaprine (FLEXERIL) 10 MG tablet TAKE ONE TABLET BY MOUTH TWICE A DAY (Patient taking differently: 3 (Three) Times a Day.) 60 tablet 0   • furosemide (LASIX) 20 MG tablet Take 1 tablet by mouth Daily. 30 tablet 11   • metoprolol succinate XL (TOPROL-XL) 100 MG 24 hr tablet Take 1 tablet by mouth Daily. 30 tablet 5   • nitroglycerin (NITROSTAT) 0.4 MG SL tablet 1 under the tongue as needed for angina, may repeat q5mins for up three doses 100 tablet 11   • potassium chloride 10 MEQ CR tablet Take 1 tablet by mouth 2 (Two) Times a Day. 60 tablet 5   • ranolazine (RANEXA) 1000 MG 12 hr tablet TAKE 1 TABLET BY MOUTH TWICE A DAY 60 tablet 11   • SENNA PLUS 8.6-50 MG per tablet 2 (Two) Times a Day.     • [DISCONTINUED] MAPAP 325 MG tablet 1 tablet As  Needed.     • [DISCONTINUED] naproxen (NAPROSYN) 500 MG tablet TAKE 1 TABLET BY MOUTH TWO TIMES A DAY WITH MEALS 60 tablet 0     No current facility-administered medications on file prior to visit.       Patient has no known allergies.    Past Medical History:   Diagnosis Date   • Coronary artery disease    • Hyperlipidemia    • Hypertension    • Myocardial infarction        Social History     Socioeconomic History   • Marital status:    Tobacco Use   • Smoking status: Every Day     Current packs/day: 1.00     Average packs/day: 1 pack/day for 30.0 years (30.0 ttl pk-yrs)     Types: Cigarettes   • Smokeless tobacco: Former   Vaping Use   • Vaping status: Never Used   Substance and Sexual Activity   • Alcohol use: No   • Drug use: No   • Sexual activity: Defer       Family History   Problem Relation Age of Onset   • Heart disease Mother    • Cancer Mother    • Hyperlipidemia Father    • Hypertension Father        Review of Systems   Constitutional: Negative.  Negative for activity change, appetite change, chills, fatigue and fever.   HENT: Negative.  Negative for congestion, sinus pressure and sinus pain.    Eyes: Negative.  Negative for visual disturbance.   Respiratory: Negative.  Positive for shortness of breath. Negative for apnea, cough, chest tightness and wheezing.    Cardiovascular:  Positive for chest pain and palpitations. Negative for leg swelling.   Gastrointestinal: Negative.  Negative for blood in stool.   Endocrine: Negative.  Negative for heat intolerance.   Genitourinary:  Negative for hematuria.   Musculoskeletal: Negative.  Negative for gait problem.   Skin: Negative.  Negative for color change, rash and wound.   Allergic/Immunologic: Negative.  Negative for environmental allergies and food allergies.   Neurological:  Positive for weakness and numbness. Negative for dizziness, syncope, light-headedness and headaches.   Hematological: Negative.  Bruises/bleeds easily.  "  Psychiatric/Behavioral: Negative.  Negative for sleep disturbance.        Objective  Vitals:    05/30/24 1042   BP: 126/87   BP Location: Left arm   Patient Position: Sitting   Cuff Size: Adult   SpO2: 98%   Weight: 95.3 kg (210 lb)   Height: 175.3 cm (69\")      /87 (BP Location: Left arm, Patient Position: Sitting, Cuff Size: Adult)   Ht 175.3 cm (69\")   Wt 95.3 kg (210 lb)   SpO2 98%   BMI 31.01 kg/m²     Lab Results (most recent)       None            Physical Exam  Vitals and nursing note reviewed.   Constitutional:       General: He is not in acute distress.     Appearance: Normal appearance. He is well-developed.   HENT:      Head: Normocephalic and atraumatic.   Eyes:      General: No scleral icterus.        Right eye: No discharge.         Left eye: No discharge.      Conjunctiva/sclera: Conjunctivae normal.   Neck:      Vascular: No carotid bruit.   Cardiovascular:      Rate and Rhythm: Normal rate and regular rhythm.      Heart sounds: Normal heart sounds. No murmur heard.     No friction rub. No gallop.   Pulmonary:      Effort: Pulmonary effort is normal. No respiratory distress.      Breath sounds: Normal breath sounds. No wheezing or rales.   Chest:      Chest wall: No tenderness.   Musculoskeletal:      Right lower leg: No edema.      Left lower leg: No edema.   Skin:     General: Skin is warm and dry.      Coloration: Skin is not pale.      Findings: No erythema or rash.   Neurological:      Mental Status: He is alert and oriented to person, place, and time.      Cranial Nerves: No cranial nerve deficit.   Psychiatric:         Behavior: Behavior normal.         Procedure  Procedures       Assessment & Plan    Problems Addressed this Visit          Cardiac and Vasculature    Ischemic cardiomyopathy    Coronary artery disease involving native coronary artery of native heart with angina pectoris    Chronic systolic congestive heart failure       Pulmonary and Pneumonias    Shortness of " breath - Primary    Relevant Orders    Ambulatory Referral to Pulmonology     Diagnoses         Codes Comments    Shortness of breath    -  Primary ICD-10-CM: R06.02  ICD-9-CM: 786.05     Coronary artery disease involving native coronary artery of native heart with angina pectoris     ICD-10-CM: I25.119  ICD-9-CM: 414.01, 413.9     Chronic systolic congestive heart failure     ICD-10-CM: I50.22  ICD-9-CM: 428.22, 428.0     Ischemic cardiomyopathy     ICD-10-CM: I25.5  ICD-9-CM: 414.8           Recommendations  1.  Patient is a 49-year-old male presenting back to the office for follow-up with known coronary disease and what appears to be stable angina.  Patient had catheterization with occluded vein graft OM with 60 to 70% IntraStent restenosis of the circumflex but DFR 1.  Medical management was recommended.  For now, he appears to be stable.  We will continue to monitor.  If symptoms were to worsen, will him to call the office.    2.  Patient with ischemic cardiomyopathy and chronic systolic heart failure.  Patient's baseline blood pressure makes it difficult to be really aggressive in regards to medical therapy.  I would like to consider Entresto.  However, patient has had blood pressure in the past at 94/67 mmHg.  We will monitor for now and can readdress upon follow-up.    3.  We will continue high-dose statin.  He is due for blood work.  We have requested a copy of his labs whenever he gets drawn again.    4.  Otherwise, I am making referral to pulmonology because of his continued dyspnea.    5.  We will see him back for follow-up as discussed in 6 months or sooner if needed.  Follow-up with primary as scheduled.             Navi Dayan  reports that he has been smoking cigarettes. He has a 30 pack-year smoking history. He has quit using smokeless tobacco. I have educated him on the risk of diseases from using tobacco products such as cancer, COPD, and heart disease.     I advised him to quit and he is not  willing to quit.    I spent 3  minutes counseling the patient.        Patient did not bring med list or medicine bottles to appointment, med list has been reviewed and updated based on patient's knowledge of their meds.    Electronically signed by:

## 2024-05-30 NOTE — PROGRESS NOTES
Problem list     Subjective   Navi Hanley is a 49 y.o. male     Chief Complaint   Patient presents with    Cath follow up   Problem list  1.  Coronary artery disease  1.1 cardiac catheterization April 2019 demonstrated high-grade three-vessel disease with an EF of 30% with recommendations for coronary artery bypass grafting  1.2 coronary artery bypass grafting, three-vessel, April 2019 per patient report, inadequate data  1.3.  Multiple catheterizations in the past  1.4 most recent catheterization was March 2024 in which patient had significant native vessel disease.  Patient had a patent LIMA to LAD and vein graft to the diagonal.  Nonobstructive disease of the RCA approaching 30%.  Multiple stents in the native circumflex with occluded vein graft to OM. 60 to 70% IntraStent stenosis of the circumflex with DFR at 1.0 with recommendations for medical management.  2.  Moderately depressed post stress ejection fraction of 38% with global hypokinesis most pronounced in the septum.  2.  Ischemic cardiomyopathy posterior lateral infarct with mild to moderate pacheco-infarct ischemia post-rest ejection fraction of 27%  2.1 EF historically around 30%  2.2 most recent echocardiogram March 2024 with an EF estimated near 40 to 45%   3.  Dyslipidemia  4.  Chronic tobacco use  5.  Chronic systolic heart failure  6.  Palpitations  6.1 event monitor in 2020 with PVCs and PACs noted.  No malignant arrhythmia identified.    HPI    Patient is a 49-year-old male presenting back to the office for routine assessment and for hospital follow-up from cardiac catheterization.    Patient has done well.  He has occasional chest discomfort but his angina appears to be stable.  He does not describe any progressive chest discomfort at all.  He had recent catheterization with findings as above.    Patient has a degree of dyspnea that is moderate at best it seems.  His dyspnea has been a major concern.  He describes smoking since a teenager.  He  has not been evaluated by pulmonology.  No complaints of PND or orthopnea.    He occasionally palpitates or senses a fluttering type sensation.  He wore event monitor in 2020 with PVCs and PACs noted.  No significant arrhythmia were identified at that time.  He feels stable.  He does not describe any strokelike symptoms.  Otherwise, patient is doing well.      Current Outpatient Medications on File Prior to Visit   Medication Sig Dispense Refill    Aspirin Low Dose 81 MG EC tablet Take 1 tablet by mouth Daily. 30 tablet 5    atorvastatin (LIPITOR) 80 MG tablet Take 1 tablet by mouth Every Night. 30 tablet 5    cetirizine (zyrTEC) 10 MG tablet Take 1 tablet by mouth Daily.      clopidogrel (PLAVIX) 75 MG tablet TAKE 1 TABLET BY MOUTH ONCE DAILY 30 tablet 11    cyclobenzaprine (FLEXERIL) 10 MG tablet TAKE ONE TABLET BY MOUTH TWICE A DAY (Patient taking differently: 3 (Three) Times a Day.) 60 tablet 0    furosemide (LASIX) 20 MG tablet Take 1 tablet by mouth Daily. 30 tablet 11    metoprolol succinate XL (TOPROL-XL) 100 MG 24 hr tablet Take 1 tablet by mouth Daily. 30 tablet 5    nitroglycerin (NITROSTAT) 0.4 MG SL tablet 1 under the tongue as needed for angina, may repeat q5mins for up three doses 100 tablet 11    potassium chloride 10 MEQ CR tablet Take 1 tablet by mouth 2 (Two) Times a Day. 60 tablet 5    ranolazine (RANEXA) 1000 MG 12 hr tablet TAKE 1 TABLET BY MOUTH TWICE A DAY 60 tablet 11    SENNA PLUS 8.6-50 MG per tablet 2 (Two) Times a Day.      [DISCONTINUED] MAPAP 325 MG tablet 1 tablet As Needed.      [DISCONTINUED] naproxen (NAPROSYN) 500 MG tablet TAKE 1 TABLET BY MOUTH TWO TIMES A DAY WITH MEALS 60 tablet 0     No current facility-administered medications on file prior to visit.       Patient has no known allergies.    Past Medical History:   Diagnosis Date    Coronary artery disease     Hyperlipidemia     Hypertension     Myocardial infarction        Social History     Socioeconomic History    Marital  "status:    Tobacco Use    Smoking status: Every Day     Current packs/day: 1.00     Average packs/day: 1 pack/day for 30.0 years (30.0 ttl pk-yrs)     Types: Cigarettes    Smokeless tobacco: Former   Vaping Use    Vaping status: Never Used   Substance and Sexual Activity    Alcohol use: No    Drug use: No    Sexual activity: Defer       Family History   Problem Relation Age of Onset    Heart disease Mother     Cancer Mother     Hyperlipidemia Father     Hypertension Father        Review of Systems   Constitutional: Negative.  Negative for activity change, appetite change, chills, fatigue and fever.   HENT: Negative.  Negative for congestion, sinus pressure and sinus pain.    Eyes: Negative.  Negative for visual disturbance.   Respiratory: Negative.  Positive for shortness of breath. Negative for apnea, cough, chest tightness and wheezing.    Cardiovascular:  Positive for chest pain and palpitations. Negative for leg swelling.   Gastrointestinal: Negative.  Negative for blood in stool.   Endocrine: Negative.  Negative for heat intolerance.   Genitourinary:  Negative for hematuria.   Musculoskeletal: Negative.  Negative for gait problem.   Skin: Negative.  Negative for color change, rash and wound.   Allergic/Immunologic: Negative.  Negative for environmental allergies and food allergies.   Neurological:  Positive for weakness and numbness. Negative for dizziness, syncope, light-headedness and headaches.   Hematological: Negative.  Bruises/bleeds easily.   Psychiatric/Behavioral: Negative.  Negative for sleep disturbance.        Objective   Vitals:    05/30/24 1042   BP: 126/87   BP Location: Left arm   Patient Position: Sitting   Cuff Size: Adult   SpO2: 98%   Weight: 95.3 kg (210 lb)   Height: 175.3 cm (69\")      /87 (BP Location: Left arm, Patient Position: Sitting, Cuff Size: Adult)   Ht 175.3 cm (69\")   Wt 95.3 kg (210 lb)   SpO2 98%   BMI 31.01 kg/m²     Lab Results (most recent)       None "            Physical Exam  Vitals and nursing note reviewed.   Constitutional:       General: He is not in acute distress.     Appearance: Normal appearance. He is well-developed.   HENT:      Head: Normocephalic and atraumatic.   Eyes:      General: No scleral icterus.        Right eye: No discharge.         Left eye: No discharge.      Conjunctiva/sclera: Conjunctivae normal.   Neck:      Vascular: No carotid bruit.   Cardiovascular:      Rate and Rhythm: Normal rate and regular rhythm.      Heart sounds: Normal heart sounds. No murmur heard.     No friction rub. No gallop.   Pulmonary:      Effort: Pulmonary effort is normal. No respiratory distress.      Breath sounds: Normal breath sounds. No wheezing or rales.   Chest:      Chest wall: No tenderness.   Musculoskeletal:      Right lower leg: No edema.      Left lower leg: No edema.   Skin:     General: Skin is warm and dry.      Coloration: Skin is not pale.      Findings: No erythema or rash.   Neurological:      Mental Status: He is alert and oriented to person, place, and time.      Cranial Nerves: No cranial nerve deficit.   Psychiatric:         Behavior: Behavior normal.         Procedure   Procedures       Assessment & Plan     Problems Addressed this Visit          Cardiac and Vasculature    Ischemic cardiomyopathy    Coronary artery disease involving native coronary artery of native heart with angina pectoris    Chronic systolic congestive heart failure       Pulmonary and Pneumonias    Shortness of breath - Primary    Relevant Orders    Ambulatory Referral to Pulmonology     Diagnoses         Codes Comments    Shortness of breath    -  Primary ICD-10-CM: R06.02  ICD-9-CM: 786.05     Coronary artery disease involving native coronary artery of native heart with angina pectoris     ICD-10-CM: I25.119  ICD-9-CM: 414.01, 413.9     Chronic systolic congestive heart failure     ICD-10-CM: I50.22  ICD-9-CM: 428.22, 428.0     Ischemic cardiomyopathy      ICD-10-CM: I25.5  ICD-9-CM: 414.8           Recommendations  1.  Patient is a 49-year-old male presenting back to the office for follow-up with known coronary disease and what appears to be stable angina.  Patient had catheterization with occluded vein graft OM with 60 to 70% IntraStent restenosis of the circumflex but DFR 1.  Medical management was recommended.  For now, he appears to be stable.  We will continue to monitor.  If symptoms were to worsen, will him to call the office.    2.  Patient with ischemic cardiomyopathy and chronic systolic heart failure.  Patient's baseline blood pressure makes it difficult to be really aggressive in regards to medical therapy.  I would like to consider Entresto.  However, patient has had blood pressure in the past at 94/67 mmHg.  We will monitor for now and can readdress upon follow-up.    3.  We will continue high-dose statin.  He is due for blood work.  We have requested a copy of his labs whenever he gets drawn again.    4.  Otherwise, I am making referral to pulmonology because of his continued dyspnea.    5.  We will see him back for follow-up as discussed in 6 months or sooner if needed.  Follow-up with primary as scheduled.             Navi Hanley  reports that he has been smoking cigarettes. He has a 30 pack-year smoking history. He has quit using smokeless tobacco. I have educated him on the risk of diseases from using tobacco products such as cancer, COPD, and heart disease.     I advised him to quit and he is not willing to quit.    I spent 3  minutes counseling the patient.        Patient did not bring med list or medicine bottles to appointment, med list has been reviewed and updated based on patient's knowledge of their meds.    Electronically signed by:

## 2024-08-16 DIAGNOSIS — R07.2 PRECORDIAL PAIN: ICD-10-CM

## 2024-08-16 RX ORDER — RANOLAZINE 1000 MG/1
TABLET, EXTENDED RELEASE ORAL
Qty: 60 TABLET | Refills: 11 | Status: SHIPPED | OUTPATIENT
Start: 2024-08-16

## 2024-09-16 RX ORDER — METOPROLOL SUCCINATE 100 MG/1
100 TABLET, EXTENDED RELEASE ORAL DAILY
Qty: 30 TABLET | Refills: 5 | Status: SHIPPED | OUTPATIENT
Start: 2024-09-16

## 2024-09-30 ENCOUNTER — TELEPHONE (OUTPATIENT)
Dept: CARDIOLOGY | Facility: CLINIC | Age: 49
End: 2024-09-30
Payer: COMMERCIAL

## 2024-09-30 DIAGNOSIS — D75.1 POLYCYTHEMIA: Primary | ICD-10-CM

## 2024-09-30 NOTE — TELEPHONE ENCOUNTER
Hub staff attempted to follow warm transfer process and was unsuccessful     Caller: Navi Hanley    Relationship to patient: Self    Best call back number: [phone]     Patient is needing: WENT TO PCP FOR FASTING LAB ON WED, WAS TOLD THAT HIS BODY WAS MAKING TO MANY RED BLOOD CELLS/ hemoglobin high. DRAINED A BAG OF BLOOD OFF THE PT ON THURSDAY. PT PASSED OUT AND HAD A seizure. IS NOW HAVING SOME CHEST PAINS.     ACTIVE NOW, MORE OF A SORENESS PAIN.  HAS BEEN GOING ON SINCE THURSDAY      PT IS WONDERING WHAT WOULD MAKE HIM PRODUCE TO SO MANY RED BLOOD CELLS.    WANTED TO MAKE SOONER APPT

## 2024-09-30 NOTE — TELEPHONE ENCOUNTER
Bolivar Poon PA Hedrick, [place]ice, MA  Caller: Unspecified (Today, 12:07 PM)  Make sure he has nitroglycerin available.  Patient had catheterization in March with stable anatomy.  Arrange a follow-up at next opening  Bolivar Poon PA   to Me       9/30/24  2:46 PM   It could be a blood disorder such as polycythemia, is he seeing hematology?  If not, he needs to    Patient routed to the  for scheduling.    Patient notified of Bolivar's message. He is not seeing hematology, do you want to put in a referral to Hematology or should the patient's PCP do it? Please advise.

## 2024-10-15 ENCOUNTER — TELEPHONE (OUTPATIENT)
Dept: CARDIOLOGY | Facility: CLINIC | Age: 49
End: 2024-10-15

## 2024-10-15 NOTE — TELEPHONE ENCOUNTER
Caller: Navi Hanley    Relationship: Self    Best call back number: [phone]     What is the best time to reach you: ANY     What was the call regarding: PT REPORTS THAT THEY WERE GIVEN A CALL BUT THERE IS NO DOCUMENTATION IN THE CHART ABOUT THIS. IF THIS WAS NOT A MISTAKE, PLEASE CALL PT BACK.     PT STATES HE WAS BEING REF TO THE BREATHING DEPARTMENT UPSTAIRS, THINKS IT MAY HAVE BEEN  ABOUT THIS. PT ALSO HAS AN APPT NEXT WEEK WITH US. PLEASE ADVISE.     Is it okay if the provider responds through MyChart: CALL

## 2024-10-24 ENCOUNTER — OFFICE VISIT (OUTPATIENT)
Dept: CARDIOLOGY | Facility: CLINIC | Age: 49
End: 2024-10-24
Payer: COMMERCIAL

## 2024-10-24 VITALS
WEIGHT: 210 LBS | SYSTOLIC BLOOD PRESSURE: 122 MMHG | OXYGEN SATURATION: 99 % | BODY MASS INDEX: 31.1 KG/M2 | HEIGHT: 69 IN | HEART RATE: 88 BPM | DIASTOLIC BLOOD PRESSURE: 57 MMHG

## 2024-10-24 DIAGNOSIS — I25.5 ISCHEMIC CARDIOMYOPATHY: ICD-10-CM

## 2024-10-24 DIAGNOSIS — R06.02 SHORTNESS OF BREATH: ICD-10-CM

## 2024-10-24 DIAGNOSIS — I25.119 CORONARY ARTERY DISEASE INVOLVING NATIVE CORONARY ARTERY OF NATIVE HEART WITH ANGINA PECTORIS: Primary | ICD-10-CM

## 2024-10-24 DIAGNOSIS — R55 SYNCOPE AND COLLAPSE: ICD-10-CM

## 2024-10-24 PROCEDURE — 99214 OFFICE O/P EST MOD 30 MIN: CPT | Performed by: PHYSICIAN ASSISTANT

## 2024-10-24 PROCEDURE — 93000 ELECTROCARDIOGRAM COMPLETE: CPT | Performed by: PHYSICIAN ASSISTANT

## 2024-10-24 PROCEDURE — 3074F SYST BP LT 130 MM HG: CPT | Performed by: PHYSICIAN ASSISTANT

## 2024-10-24 PROCEDURE — 3078F DIAST BP <80 MM HG: CPT | Performed by: PHYSICIAN ASSISTANT

## 2024-10-24 RX ORDER — METOPROLOL SUCCINATE 50 MG/1
100 TABLET, EXTENDED RELEASE ORAL DAILY
Qty: 30 TABLET | Refills: 5 | Status: SHIPPED | OUTPATIENT
Start: 2024-10-24

## 2024-10-24 NOTE — PROGRESS NOTES
"Problem list     Subjective   Navi Hanley is a 49 y.o. male     Chief Complaint   Patient presents with    Abnormal Lab    Dizziness    Chest Pain   Problem list  1.  Coronary artery disease  1.1 cardiac catheterization April 2019 demonstrated high-grade three-vessel disease with an EF of 30% with recommendations for coronary artery bypass grafting  1.2 coronary artery bypass grafting, three-vessel, April 2019 per patient report, inadequate data  1.3.  Multiple catheterizations in the past  1.4 most recent catheterization was March 2024 in which patient had significant native vessel disease.  Patient had a patent LIMA to LAD and vein graft to the diagonal.  Nonobstructive disease of the RCA approaching 30%.  Multiple stents in the native circumflex with occluded vein graft to OM. 60 to 70% IntraStent stenosis of the circumflex with DFR at 1.0 with recommendations for medical management.  2.  Moderately depressed post stress ejection fraction of 38% with global hypokinesis most pronounced in the septum.  2.  Ischemic cardiomyopathy posterior lateral infarct with mild to moderate pacheco-infarct ischemia post-rest ejection fraction of 27%  2.1 EF historically around 30%  2.2 most recent echocardiogram March 2024 with an EF estimated near 40 to 45%   3.  Dyslipidemia  4.  Chronic tobacco use  5.  Chronic systolic heart failure  6.  Palpitations  6.1 event monitor in 2020 with PVCs and PACs noted.  No malignant arrhythmia identified.    HPI    Patient is a 49-year-old male presenting to the office for evaluation.  Patient had most recent catheterization in March of this year with stable disease with medical management recommended.  Patient has  history of ischemic cardiomyopathy.    Patient recently had an issue in which she was undergoing phlebotomy and had a syncopal episode.  Since that time, patient describes that he has felt chest discomfort and dyspnea.  He felt as if he \"had a heart attack\".  Patient has chronic " chest pain and dyspnea.  His dyspnea is moderate at baseline it seems.  He does not describe PND or orthopnea.    He occasionally palpitates.  He feels significant orthostatic dizziness.  He has noticed that since the increase of metoprolol to 100 mg.  Otherwise, patient is stable.    Current Outpatient Medications on File Prior to Visit   Medication Sig Dispense Refill    Aspirin Low Dose 81 MG EC tablet Take 1 tablet by mouth Daily. 30 tablet 5    atorvastatin (LIPITOR) 80 MG tablet Take 1 tablet by mouth Every Night. 30 tablet 5    cetirizine (zyrTEC) 10 MG tablet Take 1 tablet by mouth Daily.      clopidogrel (PLAVIX) 75 MG tablet TAKE 1 TABLET BY MOUTH ONCE DAILY 30 tablet 11    cyclobenzaprine (FLEXERIL) 10 MG tablet TAKE ONE TABLET BY MOUTH TWICE A DAY (Patient taking differently: 3 (Three) Times a Day.) 60 tablet 0    furosemide (LASIX) 20 MG tablet Take 1 tablet by mouth Daily. 30 tablet 11    nitroglycerin (NITROSTAT) 0.4 MG SL tablet 1 under the tongue as needed for angina, may repeat q5mins for up three doses 100 tablet 11    potassium chloride 10 MEQ CR tablet Take 1 tablet by mouth 2 (Two) Times a Day. 60 tablet 5    ranolazine (RANEXA) 1000 MG 12 hr tablet TAKE 1 TABLET BY MOUTH TWICE A DAY 60 tablet 11    SENNA PLUS 8.6-50 MG per tablet 2 (Two) Times a Day.      [DISCONTINUED] metoprolol succinate XL (TOPROL-XL) 100 MG 24 hr tablet TAKE 1 TABLET BY MOUTH ONCE DAILY 30 tablet 5     No current facility-administered medications on file prior to visit.       Patient has no known allergies.    Past Medical History:   Diagnosis Date    Coronary artery disease     Hyperlipidemia     Hypertension     Myocardial infarction        Social History     Socioeconomic History    Marital status:    Tobacco Use    Smoking status: Every Day     Current packs/day: 1.00     Average packs/day: 1 pack/day for 30.0 years (30.0 ttl pk-yrs)     Types: Cigarettes    Smokeless tobacco: Former   Vaping Use    Vaping  "status: Never Used   Substance and Sexual Activity    Alcohol use: No    Drug use: No    Sexual activity: Defer       Family History   Problem Relation Age of Onset    Heart disease Mother     Cancer Mother     Hyperlipidemia Father     Hypertension Father        Review of Systems   Constitutional:  Positive for fatigue. Negative for activity change, appetite change and fever.   HENT: Negative.  Negative for congestion, sinus pressure and sinus pain.    Eyes: Negative.  Negative for visual disturbance.   Respiratory:  Positive for shortness of breath. Negative for apnea, cough, chest tightness and wheezing.    Cardiovascular:  Positive for chest pain and palpitations. Negative for leg swelling.   Gastrointestinal: Negative.  Negative for blood in stool.   Endocrine: Negative.  Negative for cold intolerance and heat intolerance.   Genitourinary: Negative.  Negative for hematuria.   Musculoskeletal: Negative.  Negative for gait problem.   Skin: Negative.  Negative for color change, rash and wound.   Allergic/Immunologic: Negative.  Negative for environmental allergies and food allergies.   Neurological:  Positive for dizziness, syncope, weakness and light-headedness. Negative for headaches.   Hematological: Negative.  Does not bruise/bleed easily.   Psychiatric/Behavioral: Negative.  Negative for sleep disturbance.        Objective   Vitals:    10/24/24 1321   BP: 122/57   BP Location: Right arm   Patient Position: Sitting   Cuff Size: Adult   Pulse: 88   SpO2: 99%   Weight: 95.3 kg (210 lb)   Height: 175.3 cm (69\")      /57 (BP Location: Right arm, Patient Position: Sitting, Cuff Size: Adult)   Pulse 88   Ht 175.3 cm (69\")   Wt 95.3 kg (210 lb)   SpO2 99%   BMI 31.01 kg/m²     Lab Results (most recent)       None            Physical Exam  Vitals and nursing note reviewed.   Constitutional:       General: He is not in acute distress.     Appearance: Normal appearance. He is well-developed.   HENT:      " Head: Normocephalic and atraumatic.   Eyes:      General: No scleral icterus.        Right eye: No discharge.         Left eye: No discharge.      Conjunctiva/sclera: Conjunctivae normal.   Neck:      Vascular: No carotid bruit.   Cardiovascular:      Rate and Rhythm: Normal rate and regular rhythm.      Heart sounds: Normal heart sounds. No murmur heard.     No friction rub. No gallop.   Pulmonary:      Effort: Pulmonary effort is normal. No respiratory distress.      Breath sounds: Normal breath sounds. No wheezing or rales.   Chest:      Chest wall: No tenderness.   Musculoskeletal:      Right lower leg: No edema.      Left lower leg: No edema.   Skin:     General: Skin is warm and dry.      Coloration: Skin is not pale.      Findings: No erythema or rash.   Neurological:      Mental Status: He is alert and oriented to person, place, and time.      Cranial Nerves: No cranial nerve deficit.   Psychiatric:         Behavior: Behavior normal.         Procedure     ECG 12 Lead    Date/Time: 10/24/2024 1:23 PM  Performed by: Bolivar Poon PA    Authorized by: Bolivar Poon PA  Comparison: compared with previous ECG from 2/8/2024  Comparison to previous ECG: EKG demonstrates sinus rhythm at 81 bpm, low voltage, nonspecific IVCD with nonspecific ST abnormality at baseline             Assessment & Plan     Problems Addressed this Visit          Cardiac and Vasculature    Ischemic cardiomyopathy    Relevant Medications    metoprolol succinate XL (TOPROL-XL) 50 MG 24 hr tablet    Other Relevant Orders    Adult Transthoracic Echo Complete W/ Cont if Necessary Per Protocol    Coronary artery disease involving native coronary artery of native heart with angina pectoris - Primary    Relevant Medications    metoprolol succinate XL (TOPROL-XL) 50 MG 24 hr tablet    Other Relevant Orders    Adult Transthoracic Echo Complete W/ Cont if Necessary Per Protocol       Pulmonary and Pneumonias    Shortness of breath     Relevant Orders    Adult Transthoracic Echo Complete W/ Cont if Necessary Per Protocol       Symptoms and Signs    Syncope and collapse     Diagnoses         Codes Comments    Coronary artery disease involving native coronary artery of native heart with angina pectoris    -  Primary ICD-10-CM: I25.119  ICD-9-CM: 414.01, 413.9     Ischemic cardiomyopathy     ICD-10-CM: I25.5  ICD-9-CM: 414.8     Shortness of breath     ICD-10-CM: R06.02  ICD-9-CM: 786.05     Syncope and collapse     ICD-10-CM: R55  ICD-9-CM: 780.2             Recommendations  1.  Patient is a 49-year-old male that presents to the office to be assessed.  Patient had recent episode of syncope it sounds neurocardiogenic by description.  Patient underwent phlebotomy and apparently had a syncopal episode.  I do not feel that patient suffered a major cardiac event.  However, he has concerned that he potentially had a myocardial infarction.  We discussed testing which she did not seem interested in.  I did discuss an echocardiogram to reassess his LV function.  He is interested and we will schedule.  Any chest pain, not resolved by nitroglycerin, I would recommend he go to the ER.    2.  I am decreasing his metoprolol to 50 mg which I think will help with his orthostatic dizziness.  We will make this adjustment.    3.  In regards to coronary disease, he is on aspirin and high-dose statin.  He has labs performed by primary we recommend an LDL goal less than 70.    4.  I do recommend him stop smoking because of significant disease at baseline.    5.  We will see patient for follow-up in 6 months to year or sooner for symptoms as discussed.  Follow-up with primary as scheduled.           Navi Dayan  reports that he has been smoking cigarettes. He has a 30 pack-year smoking history. He has quit using smokeless tobacco. I have educated him on the risk of diseases from using tobacco products such as cancer, COPD, and heart disease.     I advised him to quit  and he is not willing to quit.    I spent 3  minutes counseling the patient.        Patient did not bring med list or medicine bottles to appointment, med list has been reviewed and updated based on patient's knowledge of their meds.      Electronically signed by:

## 2024-10-24 NOTE — LETTER
October 24, 2024     SAIGE Kimble  [address]  [address]  [facility] [id number]    Patient: Navi Hanley   YOB: 1975   Date of Visit: 10/24/2024       Dear SAIGE Kimble    Navi Hanley was in my office today. Below is a copy of my note.    If you have questions, please do not hesitate to call me. I look forward to following Navi along with you.         Sincerely,        GARTH Lee        CC: No Recipients    Problem list     Subjective  Navi Hanley is a 49 y.o. male     Chief Complaint   Patient presents with   • Abnormal Lab   • Dizziness   • Chest Pain   Problem list  1.  Coronary artery disease  1.1 cardiac catheterization April 2019 demonstrated high-grade three-vessel disease with an EF of 30% with recommendations for coronary artery bypass grafting  1.2 coronary artery bypass grafting, three-vessel, April 2019 per patient report, inadequate data  1.3.  Multiple catheterizations in the past  1.4 most recent catheterization was March 2024 in which patient had significant native vessel disease.  Patient had a patent LIMA to LAD and vein graft to the diagonal.  Nonobstructive disease of the RCA approaching 30%.  Multiple stents in the native circumflex with occluded vein graft to OM. 60 to 70% IntraStent stenosis of the circumflex with DFR at 1.0 with recommendations for medical management.  2.  Moderately depressed post stress ejection fraction of 38% with global hypokinesis most pronounced in the septum.  2.  Ischemic cardiomyopathy posterior lateral infarct with mild to moderate pacheco-infarct ischemia post-rest ejection fraction of 27%  2.1 EF historically around 30%  2.2 most recent echocardiogram March 2024 with an EF estimated near 40 to 45%   3.  Dyslipidemia  4.  Chronic tobacco use  5.  Chronic systolic heart failure  6.  Palpitations  6.1 event monitor in 2020 with PVCs and PACs noted.  No malignant arrhythmia identified.    HPI    Patient is a  "49-year-old male presenting to the office for evaluation.  Patient had most recent catheterization in March of this year with stable disease with medical management recommended.  Patient has  history of ischemic cardiomyopathy.    Patient recently had an issue in which she was undergoing phlebotomy and had a syncopal episode.  Since that time, patient describes that he has felt chest discomfort and dyspnea.  He felt as if he \"had a heart attack\".  Patient has chronic chest pain and dyspnea.  His dyspnea is moderate at baseline it seems.  He does not describe PND or orthopnea.    He occasionally palpitates.  He feels significant orthostatic dizziness.  He has noticed that since the increase of metoprolol to 100 mg.  Otherwise, patient is stable.    Current Outpatient Medications on File Prior to Visit   Medication Sig Dispense Refill   • Aspirin Low Dose 81 MG EC tablet Take 1 tablet by mouth Daily. 30 tablet 5   • atorvastatin (LIPITOR) 80 MG tablet Take 1 tablet by mouth Every Night. 30 tablet 5   • cetirizine (zyrTEC) 10 MG tablet Take 1 tablet by mouth Daily.     • clopidogrel (PLAVIX) 75 MG tablet TAKE 1 TABLET BY MOUTH ONCE DAILY 30 tablet 11   • cyclobenzaprine (FLEXERIL) 10 MG tablet TAKE ONE TABLET BY MOUTH TWICE A DAY (Patient taking differently: 3 (Three) Times a Day.) 60 tablet 0   • furosemide (LASIX) 20 MG tablet Take 1 tablet by mouth Daily. 30 tablet 11   • nitroglycerin (NITROSTAT) 0.4 MG SL tablet 1 under the tongue as needed for angina, may repeat q5mins for up three doses 100 tablet 11   • potassium chloride 10 MEQ CR tablet Take 1 tablet by mouth 2 (Two) Times a Day. 60 tablet 5   • ranolazine (RANEXA) 1000 MG 12 hr tablet TAKE 1 TABLET BY MOUTH TWICE A DAY 60 tablet 11   • SENNA PLUS 8.6-50 MG per tablet 2 (Two) Times a Day.     • [DISCONTINUED] metoprolol succinate XL (TOPROL-XL) 100 MG 24 hr tablet TAKE 1 TABLET BY MOUTH ONCE DAILY 30 tablet 5     No current facility-administered medications " on file prior to visit.       Patient has no known allergies.    Past Medical History:   Diagnosis Date   • Coronary artery disease    • Hyperlipidemia    • Hypertension    • Myocardial infarction        Social History     Socioeconomic History   • Marital status:    Tobacco Use   • Smoking status: Every Day     Current packs/day: 1.00     Average packs/day: 1 pack/day for 30.0 years (30.0 ttl pk-yrs)     Types: Cigarettes   • Smokeless tobacco: Former   Vaping Use   • Vaping status: Never Used   Substance and Sexual Activity   • Alcohol use: No   • Drug use: No   • Sexual activity: Defer       Family History   Problem Relation Age of Onset   • Heart disease Mother    • Cancer Mother    • Hyperlipidemia Father    • Hypertension Father        Review of Systems   Constitutional:  Positive for fatigue. Negative for activity change, appetite change and fever.   HENT: Negative.  Negative for congestion, sinus pressure and sinus pain.    Eyes: Negative.  Negative for visual disturbance.   Respiratory:  Positive for shortness of breath. Negative for apnea, cough, chest tightness and wheezing.    Cardiovascular:  Positive for chest pain and palpitations. Negative for leg swelling.   Gastrointestinal: Negative.  Negative for blood in stool.   Endocrine: Negative.  Negative for cold intolerance and heat intolerance.   Genitourinary: Negative.  Negative for hematuria.   Musculoskeletal: Negative.  Negative for gait problem.   Skin: Negative.  Negative for color change, rash and wound.   Allergic/Immunologic: Negative.  Negative for environmental allergies and food allergies.   Neurological:  Positive for dizziness, syncope, weakness and light-headedness. Negative for headaches.   Hematological: Negative.  Does not bruise/bleed easily.   Psychiatric/Behavioral: Negative.  Negative for sleep disturbance.        Objective  Vitals:    10/24/24 1321   BP: 122/57   BP Location: Right arm   Patient Position: Sitting   Cuff  "Size: Adult   Pulse: 88   SpO2: 99%   Weight: 95.3 kg (210 lb)   Height: 175.3 cm (69\")      /57 (BP Location: Right arm, Patient Position: Sitting, Cuff Size: Adult)   Pulse 88   Ht 175.3 cm (69\")   Wt 95.3 kg (210 lb)   SpO2 99%   BMI 31.01 kg/m²     Lab Results (most recent)       None            Physical Exam  Vitals and nursing note reviewed.   Constitutional:       General: He is not in acute distress.     Appearance: Normal appearance. He is well-developed.   HENT:      Head: Normocephalic and atraumatic.   Eyes:      General: No scleral icterus.        Right eye: No discharge.         Left eye: No discharge.      Conjunctiva/sclera: Conjunctivae normal.   Neck:      Vascular: No carotid bruit.   Cardiovascular:      Rate and Rhythm: Normal rate and regular rhythm.      Heart sounds: Normal heart sounds. No murmur heard.     No friction rub. No gallop.   Pulmonary:      Effort: Pulmonary effort is normal. No respiratory distress.      Breath sounds: Normal breath sounds. No wheezing or rales.   Chest:      Chest wall: No tenderness.   Musculoskeletal:      Right lower leg: No edema.      Left lower leg: No edema.   Skin:     General: Skin is warm and dry.      Coloration: Skin is not pale.      Findings: No erythema or rash.   Neurological:      Mental Status: He is alert and oriented to person, place, and time.      Cranial Nerves: No cranial nerve deficit.   Psychiatric:         Behavior: Behavior normal.         Procedure    ECG 12 Lead    Date/Time: 10/24/2024 1:23 PM  Performed by: Bolivar Poon PA    Authorized by: Bolivar Poon PA  Comparison: compared with previous ECG from 2/8/2024  Comparison to previous ECG: EKG demonstrates sinus rhythm at 81 bpm, low voltage, nonspecific IVCD with nonspecific ST abnormality at baseline             Assessment & Plan    Problems Addressed this Visit          Cardiac and Vasculature    Ischemic cardiomyopathy    Relevant Medications    " metoprolol succinate XL (TOPROL-XL) 50 MG 24 hr tablet    Other Relevant Orders    Adult Transthoracic Echo Complete W/ Cont if Necessary Per Protocol    Coronary artery disease involving native coronary artery of native heart with angina pectoris - Primary    Relevant Medications    metoprolol succinate XL (TOPROL-XL) 50 MG 24 hr tablet    Other Relevant Orders    Adult Transthoracic Echo Complete W/ Cont if Necessary Per Protocol       Pulmonary and Pneumonias    Shortness of breath    Relevant Orders    Adult Transthoracic Echo Complete W/ Cont if Necessary Per Protocol       Symptoms and Signs    Syncope and collapse     Diagnoses         Codes Comments    Coronary artery disease involving native coronary artery of native heart with angina pectoris    -  Primary ICD-10-CM: I25.119  ICD-9-CM: 414.01, 413.9     Ischemic cardiomyopathy     ICD-10-CM: I25.5  ICD-9-CM: 414.8     Shortness of breath     ICD-10-CM: R06.02  ICD-9-CM: 786.05     Syncope and collapse     ICD-10-CM: R55  ICD-9-CM: 780.2             Recommendations  1.  Patient is a 49-year-old male that presents to the office to be assessed.  Patient had recent episode of syncope it sounds neurocardiogenic by description.  Patient underwent phlebotomy and apparently had a syncopal episode.  I do not feel that patient suffered a major cardiac event.  However, he has concerned that he potentially had a myocardial infarction.  We discussed testing which she did not seem interested in.  I did discuss an echocardiogram to reassess his LV function.  He is interested and we will schedule.    2.  I am decreasing his metoprolol to 50 mg which I think will help with his orthostatic dizziness.  We will make this adjustment.    3.  In regards to coronary disease, he is on aspirin and high-dose statin.  He has labs performed by primary we recommend an LDL goal less than 70.    4.  I do recommend him stop smoking because of significant disease at baseline.    5.  We  will see patient for follow-up in 6 months to year or sooner for symptoms as discussed.  Follow-up with primary as scheduled.           Navi Hanley  reports that he has been smoking cigarettes. He has a 30 pack-year smoking history. He has quit using smokeless tobacco. I have educated him on the risk of diseases from using tobacco products such as cancer, COPD, and heart disease.     I advised him to quit and he is not willing to quit.    I spent 3  minutes counseling the patient.        Patient did not bring med list or medicine bottles to appointment, med list has been reviewed and updated based on patient's knowledge of their meds.      Electronically signed by:

## 2024-11-07 ENCOUNTER — HOSPITAL ENCOUNTER (OUTPATIENT)
Dept: CARDIOLOGY | Facility: HOSPITAL | Age: 49
Discharge: HOME | End: 2024-11-07
Admitting: PHYSICIAN ASSISTANT
Payer: COMMERCIAL

## 2024-11-07 DIAGNOSIS — I25.119 CORONARY ARTERY DISEASE INVOLVING NATIVE CORONARY ARTERY OF NATIVE HEART WITH ANGINA PECTORIS: ICD-10-CM

## 2024-11-07 DIAGNOSIS — I25.5 ISCHEMIC CARDIOMYOPATHY: ICD-10-CM

## 2024-11-07 DIAGNOSIS — R06.02 SHORTNESS OF BREATH: ICD-10-CM

## 2024-11-07 LAB
BH CV ECHO MEAS - ACS: 1.89 CM
BH CV ECHO MEAS - AO MAX PG: 4.7 MMHG
BH CV ECHO MEAS - AO MEAN PG: 2.7 MMHG
BH CV ECHO MEAS - AO ROOT DIAM: 3.2 CM
BH CV ECHO MEAS - AO V2 MAX: 108.6 CM/SEC
BH CV ECHO MEAS - AO V2 VTI: 21.4 CM
BH CV ECHO MEAS - EDV(CUBED): 105.8 ML
BH CV ECHO MEAS - EDV(MOD-SP4): 94.1 ML
BH CV ECHO MEAS - EF(MOD-SP4): 40.1 %
BH CV ECHO MEAS - EF_3D-VOL: 47 %
BH CV ECHO MEAS - ESV(CUBED): 63.5 ML
BH CV ECHO MEAS - ESV(MOD-SP4): 56.4 ML
BH CV ECHO MEAS - FS: 15.6 %
BH CV ECHO MEAS - IVS/LVPW: 0.75 CM
BH CV ECHO MEAS - IVSD: 0.92 CM
BH CV ECHO MEAS - LA DIMENSION: 3.8 CM
BH CV ECHO MEAS - LAT PEAK E' VEL: 9.7 CM/SEC
BH CV ECHO MEAS - LV DIASTOLIC VOL/BSA (35-75): 44.6 CM2
BH CV ECHO MEAS - LV MASS(C)D: 182 GRAMS
BH CV ECHO MEAS - LV SYSTOLIC VOL/BSA (12-30): 26.7 CM2
BH CV ECHO MEAS - LVIDD: 4.7 CM
BH CV ECHO MEAS - LVIDS: 4 CM
BH CV ECHO MEAS - LVPWD: 1.22 CM
BH CV ECHO MEAS - MED PEAK E' VEL: 4.8 CM/SEC
BH CV ECHO MEAS - MV A MAX VEL: 69.4 CM/SEC
BH CV ECHO MEAS - MV DEC TIME: 0.25 SEC
BH CV ECHO MEAS - MV E MAX VEL: 58.3 CM/SEC
BH CV ECHO MEAS - MV E/A: 0.84
BH CV ECHO MEAS - RVDD: 2.8 CM
BH CV ECHO MEAS - SV(MOD-SP4): 37.7 ML
BH CV ECHO MEAS - SVI(MOD-SP4): 17.9 ML/M2
BH CV ECHO MEASUREMENTS AVERAGE E/E' RATIO: 8.04
LEFT ATRIUM VOLUME INDEX: 15.8 ML/M2

## 2024-11-07 PROCEDURE — 93306 TTE W/DOPPLER COMPLETE: CPT

## 2024-11-26 ENCOUNTER — TELEPHONE (OUTPATIENT)
Dept: CARDIOLOGY | Facility: CLINIC | Age: 49
End: 2024-11-26
Payer: COMMERCIAL

## 2024-11-26 NOTE — TELEPHONE ENCOUNTER
RELAY  ECHO  Called patient to notify of no acute findings or abnormalities. Keep follow up as scheduled. If you have any problem between now and then give our office a call.   ----- Message from Miri POWELL sent at 11/25/2024 11:11 AM EST -----    ----- Message -----  From: Bolivar Poon PA  Sent: 11/25/2024  10:47 AM EST  To: Miri Lester MA    Patient's ejection fraction is stable.  Routine follow-up

## 2025-01-16 RX ORDER — CLOPIDOGREL BISULFATE 75 MG/1
75 TABLET ORAL DAILY
Qty: 30 TABLET | Refills: 11 | Status: SHIPPED | OUTPATIENT
Start: 2025-01-16

## 2025-04-17 ENCOUNTER — OFFICE VISIT (OUTPATIENT)
Dept: PULMONOLOGY | Facility: CLINIC | Age: 50
End: 2025-04-17
Payer: COMMERCIAL

## 2025-04-17 VITALS
SYSTOLIC BLOOD PRESSURE: 106 MMHG | BODY MASS INDEX: 30.66 KG/M2 | DIASTOLIC BLOOD PRESSURE: 60 MMHG | WEIGHT: 207 LBS | HEART RATE: 93 BPM | OXYGEN SATURATION: 97 % | HEIGHT: 69 IN

## 2025-04-17 DIAGNOSIS — D75.1 POLYCYTHEMIA: ICD-10-CM

## 2025-04-17 DIAGNOSIS — G47.19 DAYTIME HYPERSOMNOLENCE: ICD-10-CM

## 2025-04-17 DIAGNOSIS — F17.210 NICOTINE DEPENDENCE, CIGARETTES, UNCOMPLICATED: ICD-10-CM

## 2025-04-17 DIAGNOSIS — Z12.2 ENCOUNTER FOR SCREENING FOR LUNG CANCER: ICD-10-CM

## 2025-04-17 DIAGNOSIS — R06.02 SHORTNESS OF BREATH: Primary | ICD-10-CM

## 2025-04-17 RX ORDER — CHOLECALCIFEROL (VITAMIN D3) 125 MCG
1 CAPSULE ORAL DAILY
COMMUNITY
Start: 2025-03-20

## 2025-04-17 RX ORDER — BUPROPION HYDROCHLORIDE 100 MG/1
1 TABLET, EXTENDED RELEASE ORAL DAILY
COMMUNITY
Start: 2025-03-20

## 2025-04-17 RX ORDER — BUPROPION HYDROCHLORIDE 150 MG/1
150 TABLET, EXTENDED RELEASE ORAL
COMMUNITY
Start: 2025-03-20

## 2025-04-17 RX ORDER — ALBUTEROL SULFATE 90 UG/1
2 INHALANT RESPIRATORY (INHALATION) EVERY 4 HOURS PRN
Qty: 6.7 G | Refills: 2 | Status: SHIPPED | OUTPATIENT
Start: 2025-04-17

## 2025-04-17 NOTE — PROGRESS NOTES
New Pulmonary Patient Office Visit      Patient Name: Navi Hanley    Referring Physician: Bolivar Poon PA    Chief Complaint:    Chief Complaint   Patient presents with    Breathing Problem       History of Present Illness: Navi Hanley is a 50 y.o. male who is referred here today by cardiology to establish care with Pulmonary for shortness of breath.  He notes that his baseline dyspnea has improved over the years with adjustments to his cardiac medications.  He has been following with hematology regarding polycythemia and reports having therapeutic phlebotomy in the recent past.  He reports being told that if he would quit smoking that his blood counts would likely improve.  He denies ever previously being diagnosed with any underlying lung disease.    Duration: Dyspnea since the time of his MI in 2019  Severity: Varies, can be severe  Timing: Daily  Context: Mild exertion, grocery shopping, walking uphill  Associated Symptoms: No current cough, + intermittent wheezing with exertion, no fevers, no hemoptysis, no unintended weight loss, + fatigue/snoring/awakens gasping for air/witnessed apneas  Modifying Factors: Dyspnea is worse with exertion, improves with rest.  Supplemental Oxygen: No    Subjective      Review of Systems:   Review of Systems   Constitutional:  Positive for fatigue. Negative for fever and unexpected weight change.   Respiratory:  Positive for shortness of breath and wheezing. Negative for cough.    Cardiovascular:  Negative for chest pain and leg swelling.        Past Medical History:   Past Medical History:   Diagnosis Date    Coronary artery disease     Hyperlipidemia     Hypertension     Myocardial infarction        Past Surgical History:   Past Surgical History:   Procedure Laterality Date    CARDIAC CATHETERIZATION  12/03/2019    x 2 stents    CARDIAC CATHETERIZATION  08/06/2020    no new stents Dr Aguirre    CARDIAC CATHETERIZATION  08/23/2021    no new stenting     CARDIAC CATHETERIZATION  2024    CORONARY ARTERY BYPASS GRAFT      4/2019    TONSILLECTOMY AND ADENOIDECTOMY         Family History:   Family History   Problem Relation Age of Onset    Heart disease Mother     Cancer Mother     Hyperlipidemia Father     Hypertension Father        Social History:   Social History     Socioeconomic History    Marital status:    Tobacco Use    Smoking status: Every Day     Current packs/day: 1.00     Average packs/day: 1 pack/day for 33.3 years (33.3 ttl pk-yrs)     Types: Cigarettes, Cigars     Start date: 1992     Passive exposure: Current    Smokeless tobacco: Former     Types: Chew     Quit date: 1980    Tobacco comments:     Patient states he smoked one pack of cigarettes per day up until 2022, started one pack per day of cigars starting 2022 to present.  4/17/25bb   Vaping Use    Vaping status: Former   Substance and Sexual Activity    Alcohol use: No    Drug use: No    Sexual activity: Defer       Medications:     Current Outpatient Medications:     Aspirin Low Dose 81 MG EC tablet, Take 1 tablet by mouth Daily., Disp: 30 tablet, Rfl: 5    atorvastatin (LIPITOR) 80 MG tablet, Take 1 tablet by mouth Every Night., Disp: 30 tablet, Rfl: 5    buPROPion SR (WELLBUTRIN SR) 100 MG 12 hr tablet, Take 1 tablet by mouth Daily., Disp: , Rfl:     buPROPion SR (WELLBUTRIN SR) 150 MG 12 hr tablet, Take 1 tablet by mouth every night at bedtime., Disp: , Rfl:     cetirizine (zyrTEC) 10 MG tablet, Take 1 tablet by mouth Daily., Disp: , Rfl:     clopidogrel (PLAVIX) 75 MG tablet, TAKE 1 TABLET BY MOUTH ONCE DAILY, Disp: 30 tablet, Rfl: 11    cyclobenzaprine (FLEXERIL) 10 MG tablet, TAKE ONE TABLET BY MOUTH TWICE A DAY (Patient taking differently: 3 (Three) Times a Day.), Disp: 60 tablet, Rfl: 0    D-3-5 125 MCG (5000 UT) capsule capsule, Take 1 capsule by mouth Daily., Disp: , Rfl:     furosemide (LASIX) 20 MG tablet, Take 1 tablet by mouth Daily., Disp: 30 tablet, Rfl: 11    metoprolol  "succinate XL (TOPROL-XL) 50 MG 24 hr tablet, Take 2 tablets by mouth Daily., Disp: 30 tablet, Rfl: 5    nitroglycerin (NITROSTAT) 0.4 MG SL tablet, 1 under the tongue as needed for angina, may repeat q5mins for up three doses, Disp: 100 tablet, Rfl: 11    potassium chloride 10 MEQ CR tablet, Take 1 tablet by mouth 2 (Two) Times a Day., Disp: 60 tablet, Rfl: 5    ranolazine (RANEXA) 1000 MG 12 hr tablet, TAKE 1 TABLET BY MOUTH TWICE A DAY, Disp: 60 tablet, Rfl: 11    SENNA PLUS 8.6-50 MG per tablet, 2 (Two) Times a Day., Disp: , Rfl:     Allergies:   No Known Allergies    Objective     Physical Exam:  Vital Signs:   Vitals:    04/17/25 1132   BP: 106/60   Pulse: 93   SpO2: 97%   Weight: 93.9 kg (207 lb)   Height: 175.3 cm (69\")   ============================  ============================    6 MINUTE WALK TEST    Navi Hanley   1975             BASELINE   SpO2%: 97 % RA    Heart Rate 93   Blood Pressure 106/60     EXERCISE SpO2% HEART RATE RA or O2 @ LPM   1 MINUTE 94 95 RA   2 MINUTES 93 104 RA   3 MINUTES 95 108 RA   4 MINUTES 95 108 RA   5 MINUTES 95 104 RA   6 MINUTES 96 106 RA   (Number of laps: 7 X 36 meters + Final partial lap: 0 meters = 252 meters)            Distance Walked:  252 Meters   SpO2% Post Exercise:  95 %   HR Post Exercise:  111     Reason to stop (if applicable):   ____ Chest Pain   ____ Light Headedness   ____ Dyspnea Unrelieved by Rest   ____ Abnormal Gait Pattern   ____ Severe Fatigue   ____ Other (Specify: __________________________)    Tech Comments (if any): NA     Test performed by: BB    ============================  ============================    Body mass index is 30.57 kg/m².    Physical Exam  Vitals reviewed.   Constitutional:       General: He is not in acute distress.     Appearance: He is not toxic-appearing.   HENT:      Head: Normocephalic and atraumatic.      Mouth/Throat:      Mouth: Mucous membranes are moist.   Eyes:      Extraocular Movements: Extraocular movements " intact.      Conjunctiva/sclera: Conjunctivae normal.   Cardiovascular:      Rate and Rhythm: Normal rate.      Heart sounds: Normal heart sounds.   Pulmonary:      Effort: Pulmonary effort is normal.      Breath sounds: Normal breath sounds.   Abdominal:      General: There is no distension.      Palpations: Abdomen is soft.   Musculoskeletal:         General: No swelling.      Cervical back: Neck supple.   Skin:     General: Skin is warm and dry.      Findings: No rash.   Neurological:      General: No focal deficit present.      Mental Status: He is alert and oriented to person, place, and time.   Psychiatric:         Mood and Affect: Mood normal.         Behavior: Behavior normal.       Assessment / Plan      Assessment/Plan:    Diagnoses and all orders for this visit:    1. Shortness of breath (Primary)  -     Complete PFT - Pre & Post Bronchodilator; Future  -     albuterol sulfate  (90 Base) MCG/ACT inhaler; Inhale 2 puffs Every 4 (Four) Hours As Needed for Wheezing or Shortness of Air.  Dispense: 6.7 g; Refill: 2  -     6 Minute Walk Test; Future  No need for supplemental O2 per walk test performed today. Patient is encouraged to maintain as much physical activity as he can safely tolerate.  Suspect underlying obstructive lung disease.  Further assess with full PFTs.  Begin as needed use of albuterol.  Will consider the addition of a maintenance inhaler at next visit.  Continues to follow with cardiology.    2. Daytime hypersomnolence  -     Home Sleep Study; Future  Patient with multiple symptoms suggestive of sleep apnea. Will check sleep study to evaluate.     3. Nicotine dependence, cigarettes, uncomplicated  -      CT Chest Low Dose Cancer Screening WO; Future  Complete cessation is advised.  Discussed that pharmacologic cessation aids could be initiated if he desired.    4. Encounter for screening for lung cancer  -      CT Chest Low Dose Cancer Screening WO; Future  Patient with greater than  30-pack-year smoking history, and therefore low-dose lung cancer screening is recommended.  Shared decision making counseling including risks and benefits of low-dose lung cancer screening, follow-up diagnostic testing that may be indicated and how comorbid conditions would affect diagnosis/treatment, false positive rates, and total radiation exposure.  Patient wishes to undergo screening, seen as they would be willing to seek diagnosis and treatment if necessitated by results.    5. Polycythemia  Following with hematology.  Suspect that ongoing smoking and underlying lung disease +/- sleep apnea is likely contributing.  Pulmonary workup as noted above.       Follow Up:   Return in about 3 months (around 7/17/2025) for Recheck, Follow up after testing complete.  The patient was counseled on diagnostic results, risks and benefits of treatment options, risk factor modifications and the importance of treatment compliance. The patient was advised to contact the clinic with concerns or worsening symptoms.     SAIGE Evans  Pulmonary Medicine [place]    This document has been electronically signed by SAIGE Evans  April 17, 2025

## 2025-05-08 DIAGNOSIS — G47.19 DAYTIME HYPERSOMNOLENCE: ICD-10-CM

## 2025-05-09 DIAGNOSIS — G47.33 OSA (OBSTRUCTIVE SLEEP APNEA): Primary | ICD-10-CM

## 2025-05-12 ENCOUNTER — TELEPHONE (OUTPATIENT)
Dept: PULMONOLOGY | Facility: CLINIC | Age: 50
End: 2025-05-12
Payer: COMMERCIAL

## 2025-05-15 DIAGNOSIS — R06.02 SHORTNESS OF BREATH: ICD-10-CM

## 2025-05-16 DIAGNOSIS — Z12.2 ENCOUNTER FOR SCREENING FOR LUNG CANCER: ICD-10-CM

## 2025-05-16 DIAGNOSIS — F17.210 NICOTINE DEPENDENCE, CIGARETTES, UNCOMPLICATED: ICD-10-CM

## 2025-05-28 ENCOUNTER — OFFICE VISIT (OUTPATIENT)
Dept: CARDIOLOGY | Facility: CLINIC | Age: 50
End: 2025-05-28
Payer: COMMERCIAL

## 2025-05-28 VITALS
WEIGHT: 214 LBS | OXYGEN SATURATION: 96 % | HEART RATE: 87 BPM | SYSTOLIC BLOOD PRESSURE: 116 MMHG | HEIGHT: 69 IN | BODY MASS INDEX: 31.7 KG/M2 | DIASTOLIC BLOOD PRESSURE: 83 MMHG

## 2025-05-28 DIAGNOSIS — I25.119 CORONARY ARTERY DISEASE INVOLVING NATIVE CORONARY ARTERY OF NATIVE HEART WITH ANGINA PECTORIS: Primary | ICD-10-CM

## 2025-05-28 DIAGNOSIS — R06.02 SHORTNESS OF BREATH: ICD-10-CM

## 2025-05-28 DIAGNOSIS — I25.5 ISCHEMIC CARDIOMYOPATHY: ICD-10-CM

## 2025-05-28 DIAGNOSIS — I50.22 CHRONIC SYSTOLIC CONGESTIVE HEART FAILURE: ICD-10-CM

## 2025-05-28 PROCEDURE — 3079F DIAST BP 80-89 MM HG: CPT | Performed by: PHYSICIAN ASSISTANT

## 2025-05-28 PROCEDURE — 3074F SYST BP LT 130 MM HG: CPT | Performed by: PHYSICIAN ASSISTANT

## 2025-05-28 PROCEDURE — 99214 OFFICE O/P EST MOD 30 MIN: CPT | Performed by: PHYSICIAN ASSISTANT

## 2025-05-28 RX ORDER — METOPROLOL SUCCINATE 100 MG/1
100 TABLET, EXTENDED RELEASE ORAL DAILY
Qty: 30 TABLET | Refills: 11 | Status: SHIPPED | OUTPATIENT
Start: 2025-05-28

## 2025-05-28 NOTE — PROGRESS NOTES
Problem list     Subjective   Navi Hanley is a 50 y.o. male     Chief Complaint   Patient presents with    7 month follow up     CAD     Problem list  1.  Coronary artery disease  1.1 cardiac catheterization April 2019 demonstrated high-grade three-vessel disease with an EF of 30% with recommendations for coronary artery bypass grafting  1.2 coronary artery bypass grafting, three-vessel, April 2019 per patient report, inadequate data  1.3.  Multiple catheterizations in the past  1.4 most recent catheterization was March 2024 in which patient had significant native vessel disease.  Patient had a patent LIMA to LAD and vein graft to the diagonal.  Nonobstructive disease of the RCA approaching 30%.  Multiple stents in the native circumflex with occluded vein graft to OM. 60 to 70% IntraStent stenosis of the circumflex with DFR at 1.0 with recommendations for medical management.  2.  Moderately depressed post stress ejection fraction of 38% with global hypokinesis most pronounced in the septum.  2.  Ischemic cardiomyopathy posterior lateral infarct with mild to moderate pcaheco-infarct ischemia post-rest ejection fraction of 27%  2.1 EF historically around 30%  2.2  echocardiogram March 2024 with an EF estimated near 40 to 45%   3 echocardiogram November 2024 with an EF of 45 to 50%  3.  Dyslipidemia  4.  Chronic tobacco use  5.  Chronic systolic heart failure  6.  Palpitations  6.1 event monitor in 2020 with PVCs and PACs noted.  No malignant arrhythmia identified.    HPI    Patient is a 50-year-old male into the office to be evaluated.  He has been stable.  He has chronic chest discomfort.  It appears relatively stable by description.  He does not describe any severe or progressive symptoms.  He does occasionally take nitroglycerin.  Patient is on antianginal therapy.    He has a degree of dyspnea that appears moderate at baseline.  Patient recently was placed on an inhaler.  He has chronic lung disease follow-up  pulmonology.  He does not describe PND or orthopnea.    He may occasionally palpitate or sense a flutter type sensation.  He does not describe any strokelike symptoms.  No complaints of dizziness, presyncope or syncope.  He is stable otherwise.      Current Outpatient Medications on File Prior to Visit   Medication Sig Dispense Refill    albuterol sulfate  (90 Base) MCG/ACT inhaler Inhale 2 puffs Every 4 (Four) Hours As Needed for Wheezing or Shortness of Air. 6.7 g 2    Aspirin Low Dose 81 MG EC tablet Take 1 tablet by mouth Daily. 30 tablet 5    atorvastatin (LIPITOR) 80 MG tablet Take 1 tablet by mouth Every Night. 30 tablet 5    buPROPion SR (WELLBUTRIN SR) 100 MG 12 hr tablet Take 1 tablet by mouth Daily.      buPROPion SR (WELLBUTRIN SR) 150 MG 12 hr tablet Take 1 tablet by mouth every night at bedtime.      cetirizine (zyrTEC) 10 MG tablet Take 1 tablet by mouth Daily.      clopidogrel (PLAVIX) 75 MG tablet TAKE 1 TABLET BY MOUTH ONCE DAILY 30 tablet 11    cyclobenzaprine (FLEXERIL) 10 MG tablet TAKE ONE TABLET BY MOUTH TWICE A DAY (Patient taking differently: 3 (Three) Times a Day.) 60 tablet 0    D-3-5 125 MCG (5000 UT) capsule capsule Take 1 capsule by mouth Daily.      furosemide (LASIX) 20 MG tablet Take 1 tablet by mouth Daily. 30 tablet 11    nitroglycerin (NITROSTAT) 0.4 MG SL tablet 1 under the tongue as needed for angina, may repeat q5mins for up three doses 100 tablet 11    potassium chloride 10 MEQ CR tablet Take 1 tablet by mouth 2 (Two) Times a Day. 60 tablet 5    ranolazine (RANEXA) 1000 MG 12 hr tablet TAKE 1 TABLET BY MOUTH TWICE A DAY 60 tablet 11    SENNA PLUS 8.6-50 MG per tablet 2 (Two) Times a Day.      [DISCONTINUED] metoprolol succinate XL (TOPROL-XL) 50 MG 24 hr tablet Take 2 tablets by mouth Daily. (Patient taking differently: Take 1 tablet by mouth 2 (Two) Times a Day.) 30 tablet 5     No current facility-administered medications on file prior to visit.       Patient has no  known allergies.    Past Medical History:   Diagnosis Date    Coronary artery disease     Hyperlipidemia     Hypertension     Myocardial infarction        Social History     Socioeconomic History    Marital status:    Tobacco Use    Smoking status: Every Day     Current packs/day: 1.00     Average packs/day: 1 pack/day for 33.4 years (33.4 ttl pk-yrs)     Types: Cigarettes, Cigars     Start date: 1992     Passive exposure: Current    Smokeless tobacco: Former     Types: Chew     Quit date: 1980    Tobacco comments:     Patient states he smoked one pack of cigarettes per day up until 2022, started one pack per day of cigars starting 2022 to present.  4/17/25bb   Vaping Use    Vaping status: Former   Substance and Sexual Activity    Alcohol use: No    Drug use: No    Sexual activity: Defer       Family History   Problem Relation Age of Onset    Heart disease Mother     Cancer Mother     Hyperlipidemia Father     Hypertension Father        Review of Systems   Constitutional: Negative.  Negative for activity change, appetite change, chills, fatigue and fever.   HENT: Negative.  Negative for congestion, sinus pressure and sinus pain.    Eyes: Negative.  Negative for discharge and itching.   Respiratory:  Positive for shortness of breath. Negative for apnea, cough, chest tightness and wheezing.    Cardiovascular:  Positive for chest pain and palpitations. Negative for leg swelling.   Gastrointestinal: Negative.  Negative for blood in stool.   Endocrine: Negative.  Negative for cold intolerance and heat intolerance.   Genitourinary: Negative.  Negative for hematuria.   Musculoskeletal: Negative.  Negative for gait problem.   Skin:  Negative for color change, rash and wound.   Allergic/Immunologic: Negative.  Negative for environmental allergies and food allergies.   Neurological:  Negative for dizziness, syncope, weakness, light-headedness and numbness.   Hematological:  Bruises/bleeds easily.  "  Psychiatric/Behavioral: Negative.  Negative for sleep disturbance.        Objective   Vitals:    05/28/25 0844   BP: 116/83   BP Location: Right arm   Patient Position: Sitting   Cuff Size: Adult   Pulse: 87   SpO2: 96%   Weight: 97.1 kg (214 lb)   Height: 175.3 cm (69\")      /83 (BP Location: Right arm, Patient Position: Sitting, Cuff Size: Adult)   Pulse 87   Ht 175.3 cm (69\")   Wt 97.1 kg (214 lb)   SpO2 96%   BMI 31.60 kg/m²     Lab Results (most recent)       None            Physical Exam  Vitals and nursing note reviewed.   Constitutional:       General: He is not in acute distress.     Appearance: Normal appearance. He is well-developed.   HENT:      Head: Normocephalic and atraumatic.   Eyes:      General: No scleral icterus.        Right eye: No discharge.         Left eye: No discharge.      Conjunctiva/sclera: Conjunctivae normal.   Neck:      Vascular: No carotid bruit.   Cardiovascular:      Rate and Rhythm: Normal rate and regular rhythm.      Heart sounds: Normal heart sounds. No murmur heard.     No friction rub. No gallop.   Pulmonary:      Effort: Pulmonary effort is normal. No respiratory distress.      Breath sounds: Normal breath sounds. No wheezing or rales.   Chest:      Chest wall: No tenderness.   Musculoskeletal:      Right lower leg: No edema.      Left lower leg: No edema.   Skin:     General: Skin is warm and dry.      Coloration: Skin is not pale.      Findings: No erythema or rash.   Neurological:      Mental Status: He is alert and oriented to person, place, and time.      Cranial Nerves: No cranial nerve deficit.   Psychiatric:         Behavior: Behavior normal.         Procedure   Procedures       Assessment & Plan     Problems Addressed this Visit          Cardiac and Vasculature    Ischemic cardiomyopathy    Relevant Medications    metoprolol succinate XL (TOPROL-XL) 100 MG 24 hr tablet    Coronary artery disease involving native coronary artery of native heart with " angina pectoris - Primary    Relevant Medications    metoprolol succinate XL (TOPROL-XL) 100 MG 24 hr tablet    Chronic systolic congestive heart failure    Relevant Medications    metoprolol succinate XL (TOPROL-XL) 100 MG 24 hr tablet       Pulmonary and Pneumonias    Shortness of breath     Diagnoses         Codes Comments      Coronary artery disease involving native coronary artery of native heart with angina pectoris    -  Primary ICD-10-CM: I25.119  ICD-9-CM: 414.01, 413.9       Ischemic cardiomyopathy     ICD-10-CM: I25.5  ICD-9-CM: 414.8       Chronic systolic congestive heart failure     ICD-10-CM: I50.22  ICD-9-CM: 428.22, 428.0       Shortness of breath     ICD-10-CM: R06.02  ICD-9-CM: 786.05             Recommendations  1.  Patient is a 50-year-old male with recent catheterization last year showing stable anatomy.  He has stable angina by description without progression or change.  For now, we will monitor.  He is on maximum dose of Ranexa.  Because of his blood pressure, we are limited on adding further medication and he is on high-dose beta-blocker.  We will continue.    2.  Patient with cardiomyopathy with improvement of systolic function.  We will continue medical therapy.  We would like an ACE inhibitor or Entresto but blood pressure will allow at this time.    3.  Patient with chronic systolic heart failure appear euvolemic on exam.  I will make no changes at this time.    4.  Dyspnea is chronic and likely related to his chronic lung disease.  We will make no changes but continue to monitor.  We can see him back for follow-up in 6 months or sooner if needed.  Follow-up with primary as scheduled.           Navi Hanley  reports that he has been smoking cigarettes and cigars. He started smoking about 33 years ago. He has a 33.4 pack-year smoking history. He has been exposed to tobacco smoke. He quit smokeless tobacco use about 45 years ago.  His smokeless tobacco use included chew. I have  educated him on the risk of diseases from using tobacco products such as cancer, COPD, and heart disease.     I advised him to quit and he is not willing to quit.    I spent 3  minutes counseling the patient.       Patient did not bring med list or medicine bottles to appointment, med list has been reviewed and updated based on patient's knowledge of their meds.    Electronically signed by:

## 2025-05-28 NOTE — LETTER
May 28, 2025     SAIGE Kimble  [address]  [address]  [facility] [address]    Patient: Navi Hanley   YOB: 1975   Date of Visit: 5/28/2025       Dear SAIGE Kimble    Navi Hanley was in my office today. Below is a copy of my note.    If you have questions, please do not hesitate to call me. I look forward to following Navi along with you.         Sincerely,        GARTH Lee        CC: No Recipients    Problem list     Subjective  Navi Hanley is a 50 y.o. male     Chief Complaint   Patient presents with   • 7 month follow up     CAD     Problem list  1.  Coronary artery disease  1.1 cardiac catheterization April 2019 demonstrated high-grade three-vessel disease with an EF of 30% with recommendations for coronary artery bypass grafting  1.2 coronary artery bypass grafting, three-vessel, April 2019 per patient report, inadequate data  1.3.  Multiple catheterizations in the past  1.4 most recent catheterization was March 2024 in which patient had significant native vessel disease.  Patient had a patent LIMA to LAD and vein graft to the diagonal.  Nonobstructive disease of the RCA approaching 30%.  Multiple stents in the native circumflex with occluded vein graft to OM. 60 to 70% IntraStent stenosis of the circumflex with DFR at 1.0 with recommendations for medical management.  2.  Moderately depressed post stress ejection fraction of 38% with global hypokinesis most pronounced in the septum.  2.  Ischemic cardiomyopathy posterior lateral infarct with mild to moderate pacheco-infarct ischemia post-rest ejection fraction of 27%  2.1 EF historically around 30%  2.2  echocardiogram March 2024 with an EF estimated near 40 to 45%   3 echocardiogram November 2024 with an EF of 45 to 50%  3.  Dyslipidemia  4.  Chronic tobacco use  5.  Chronic systolic heart failure  6.  Palpitations  6.1 event monitor in 2020 with PVCs and PACs noted.  No malignant arrhythmia  identified.    HPI    Patient is a 50-year-old male into the office to be evaluated.  He has been stable.  He has chronic chest discomfort.  It appears relatively stable by description.  He does not describe any severe or progressive symptoms.  He does occasionally take nitroglycerin.  Patient is on antianginal therapy.    He has a degree of dyspnea that appears moderate at baseline.  Patient recently was placed on an inhaler.  He has chronic lung disease follow-up pulmonology.  He does not describe PND or orthopnea.    He may occasionally palpitate or sense a flutter type sensation.  He does not describe any strokelike symptoms.  No complaints of dizziness, presyncope or syncope.  He is stable otherwise.      Current Outpatient Medications on File Prior to Visit   Medication Sig Dispense Refill   • albuterol sulfate  (90 Base) MCG/ACT inhaler Inhale 2 puffs Every 4 (Four) Hours As Needed for Wheezing or Shortness of Air. 6.7 g 2   • Aspirin Low Dose 81 MG EC tablet Take 1 tablet by mouth Daily. 30 tablet 5   • atorvastatin (LIPITOR) 80 MG tablet Take 1 tablet by mouth Every Night. 30 tablet 5   • buPROPion SR (WELLBUTRIN SR) 100 MG 12 hr tablet Take 1 tablet by mouth Daily.     • buPROPion SR (WELLBUTRIN SR) 150 MG 12 hr tablet Take 1 tablet by mouth every night at bedtime.     • cetirizine (zyrTEC) 10 MG tablet Take 1 tablet by mouth Daily.     • clopidogrel (PLAVIX) 75 MG tablet TAKE 1 TABLET BY MOUTH ONCE DAILY 30 tablet 11   • cyclobenzaprine (FLEXERIL) 10 MG tablet TAKE ONE TABLET BY MOUTH TWICE A DAY (Patient taking differently: 3 (Three) Times a Day.) 60 tablet 0   • D-3-5 125 MCG (5000 UT) capsule capsule Take 1 capsule by mouth Daily.     • furosemide (LASIX) 20 MG tablet Take 1 tablet by mouth Daily. 30 tablet 11   • nitroglycerin (NITROSTAT) 0.4 MG SL tablet 1 under the tongue as needed for angina, may repeat q5mins for up three doses 100 tablet 11   • potassium chloride 10 MEQ CR tablet Take 1  tablet by mouth 2 (Two) Times a Day. 60 tablet 5   • ranolazine (RANEXA) 1000 MG 12 hr tablet TAKE 1 TABLET BY MOUTH TWICE A DAY 60 tablet 11   • SENNA PLUS 8.6-50 MG per tablet 2 (Two) Times a Day.     • [DISCONTINUED] metoprolol succinate XL (TOPROL-XL) 50 MG 24 hr tablet Take 2 tablets by mouth Daily. (Patient taking differently: Take 1 tablet by mouth 2 (Two) Times a Day.) 30 tablet 5     No current facility-administered medications on file prior to visit.       Patient has no known allergies.    Past Medical History:   Diagnosis Date   • Coronary artery disease    • Hyperlipidemia    • Hypertension    • Myocardial infarction        Social History     Socioeconomic History   • Marital status:    Tobacco Use   • Smoking status: Every Day     Current packs/day: 1.00     Average packs/day: 1 pack/day for 33.4 years (33.4 ttl pk-yrs)     Types: Cigarettes, Cigars     Start date: 1992     Passive exposure: Current   • Smokeless tobacco: Former     Types: Chew     Quit date: 1980   • Tobacco comments:     Patient states he smoked one pack of cigarettes per day up until 2022, started one pack per day of cigars starting 2022 to present.  4/17/25bb   Vaping Use   • Vaping status: Former   Substance and Sexual Activity   • Alcohol use: No   • Drug use: No   • Sexual activity: Defer       Family History   Problem Relation Age of Onset   • Heart disease Mother    • Cancer Mother    • Hyperlipidemia Father    • Hypertension Father        Review of Systems   Constitutional: Negative.  Negative for activity change, appetite change, chills, fatigue and fever.   HENT: Negative.  Negative for congestion, sinus pressure and sinus pain.    Eyes: Negative.  Negative for discharge and itching.   Respiratory:  Positive for shortness of breath. Negative for apnea, cough, chest tightness and wheezing.    Cardiovascular:  Positive for chest pain and palpitations. Negative for leg swelling.   Gastrointestinal: Negative.  Negative  "for blood in stool.   Endocrine: Negative.  Negative for cold intolerance and heat intolerance.   Genitourinary: Negative.  Negative for hematuria.   Musculoskeletal: Negative.  Negative for gait problem.   Skin:  Negative for color change, rash and wound.   Allergic/Immunologic: Negative.  Negative for environmental allergies and food allergies.   Neurological:  Negative for dizziness, syncope, weakness, light-headedness and numbness.   Hematological:  Bruises/bleeds easily.   Psychiatric/Behavioral: Negative.  Negative for sleep disturbance.        Objective  Vitals:    05/28/25 0844   BP: 116/83   BP Location: Right arm   Patient Position: Sitting   Cuff Size: Adult   Pulse: 87   SpO2: 96%   Weight: 97.1 kg (214 lb)   Height: 175.3 cm (69\")      /83 (BP Location: Right arm, Patient Position: Sitting, Cuff Size: Adult)   Pulse 87   Ht 175.3 cm (69\")   Wt 97.1 kg (214 lb)   SpO2 96%   BMI 31.60 kg/m²     Lab Results (most recent)       None            Physical Exam  Vitals and nursing note reviewed.   Constitutional:       General: He is not in acute distress.     Appearance: Normal appearance. He is well-developed.   HENT:      Head: Normocephalic and atraumatic.   Eyes:      General: No scleral icterus.        Right eye: No discharge.         Left eye: No discharge.      Conjunctiva/sclera: Conjunctivae normal.   Neck:      Vascular: No carotid bruit.   Cardiovascular:      Rate and Rhythm: Normal rate and regular rhythm.      Heart sounds: Normal heart sounds. No murmur heard.     No friction rub. No gallop.   Pulmonary:      Effort: Pulmonary effort is normal. No respiratory distress.      Breath sounds: Normal breath sounds. No wheezing or rales.   Chest:      Chest wall: No tenderness.   Musculoskeletal:      Right lower leg: No edema.      Left lower leg: No edema.   Skin:     General: Skin is warm and dry.      Coloration: Skin is not pale.      Findings: No erythema or rash.   Neurological:      " Mental Status: He is alert and oriented to person, place, and time.      Cranial Nerves: No cranial nerve deficit.   Psychiatric:         Behavior: Behavior normal.         Procedure  Procedures       Assessment & Plan    Problems Addressed this Visit          Cardiac and Vasculature    Ischemic cardiomyopathy    Relevant Medications    metoprolol succinate XL (TOPROL-XL) 100 MG 24 hr tablet    Coronary artery disease involving native coronary artery of native heart with angina pectoris - Primary    Relevant Medications    metoprolol succinate XL (TOPROL-XL) 100 MG 24 hr tablet    Chronic systolic congestive heart failure    Relevant Medications    metoprolol succinate XL (TOPROL-XL) 100 MG 24 hr tablet       Pulmonary and Pneumonias    Shortness of breath     Diagnoses         Codes Comments      Coronary artery disease involving native coronary artery of native heart with angina pectoris    -  Primary ICD-10-CM: I25.119  ICD-9-CM: 414.01, 413.9       Ischemic cardiomyopathy     ICD-10-CM: I25.5  ICD-9-CM: 414.8       Chronic systolic congestive heart failure     ICD-10-CM: I50.22  ICD-9-CM: 428.22, 428.0       Shortness of breath     ICD-10-CM: R06.02  ICD-9-CM: 786.05             Recommendations  1.  Patient is a 50-year-old male with recent catheterization last year showing stable anatomy.  He has stable angina by description without progression or change.  For now, we will monitor.  He is on maximum dose of Ranexa.  Because of his blood pressure, we are limited on adding further medication and he is on high-dose beta-blocker.  We will continue.    2.  Patient with cardiomyopathy with improvement of systolic function.  We will continue medical therapy.  We would like an ACE inhibitor or Entresto but blood pressure will allow at this time.    3.  Patient with chronic systolic heart failure appear euvolemic on exam.  I will make no changes at this time.    4.  Dyspnea is chronic and likely related to his chronic  lung disease.  We will make no changes but continue to monitor.  We can see him back for follow-up in 6 months or sooner if needed.  Follow-up with primary as scheduled.           Navi Hanley  reports that he has been smoking cigarettes and cigars. He started smoking about 33 years ago. He has a 33.4 pack-year smoking history. He has been exposed to tobacco smoke. He quit smokeless tobacco use about 45 years ago.  His smokeless tobacco use included chew. I have educated him on the risk of diseases from using tobacco products such as cancer, COPD, and heart disease.     I advised him to quit and he is not willing to quit.    I spent 3  minutes counseling the patient.       Patient did not bring med list or medicine bottles to appointment, med list has been reviewed and updated based on patient's knowledge of their meds.    Electronically signed by:

## 2025-07-01 DIAGNOSIS — R06.02 SHORTNESS OF BREATH: ICD-10-CM

## 2025-07-01 RX ORDER — ALBUTEROL SULFATE 90 UG/1
AEROSOL, METERED RESPIRATORY (INHALATION)
Qty: 18 G | Refills: 2 | Status: SHIPPED | OUTPATIENT
Start: 2025-07-01

## 2025-08-07 ENCOUNTER — OFFICE VISIT (OUTPATIENT)
Dept: PULMONOLOGY | Facility: CLINIC | Age: 50
End: 2025-08-07
Payer: COMMERCIAL

## 2025-08-07 VITALS
SYSTOLIC BLOOD PRESSURE: 110 MMHG | HEART RATE: 77 BPM | OXYGEN SATURATION: 98 % | DIASTOLIC BLOOD PRESSURE: 82 MMHG | BODY MASS INDEX: 31.7 KG/M2 | HEIGHT: 69 IN | WEIGHT: 214 LBS

## 2025-08-07 DIAGNOSIS — F17.210 NICOTINE DEPENDENCE, CIGARETTES, UNCOMPLICATED: ICD-10-CM

## 2025-08-07 DIAGNOSIS — J43.9 PULMONARY EMPHYSEMA, UNSPECIFIED EMPHYSEMA TYPE: ICD-10-CM

## 2025-08-07 DIAGNOSIS — R93.89 ABNORMAL CHEST CT: ICD-10-CM

## 2025-08-07 DIAGNOSIS — G47.33 OSA (OBSTRUCTIVE SLEEP APNEA): ICD-10-CM

## 2025-08-07 DIAGNOSIS — J44.9 CHRONIC OBSTRUCTIVE PULMONARY DISEASE, UNSPECIFIED COPD TYPE: Primary | ICD-10-CM

## 2025-08-07 RX ORDER — BUDESONIDE AND FORMOTEROL FUMARATE DIHYDRATE 160; 4.5 UG/1; UG/1
2 AEROSOL RESPIRATORY (INHALATION)
Qty: 1 EACH | Refills: 5 | Status: SHIPPED | OUTPATIENT
Start: 2025-08-07

## 2025-08-18 DIAGNOSIS — R07.2 PRECORDIAL PAIN: ICD-10-CM

## 2025-08-18 RX ORDER — RANOLAZINE 1000 MG/1
1 TABLET, EXTENDED RELEASE ORAL EVERY 12 HOURS SCHEDULED
Qty: 60 TABLET | Refills: 11 | Status: SHIPPED | OUTPATIENT
Start: 2025-08-18

## 2025-08-21 DIAGNOSIS — R93.89 ABNORMAL CHEST CT: ICD-10-CM
